# Patient Record
Sex: FEMALE | Race: OTHER | Employment: FULL TIME | URBAN - METROPOLITAN AREA
[De-identification: names, ages, dates, MRNs, and addresses within clinical notes are randomized per-mention and may not be internally consistent; named-entity substitution may affect disease eponyms.]

---

## 2017-03-09 ENCOUNTER — OFFICE VISIT (OUTPATIENT)
Dept: INTERNAL MEDICINE | Age: 62
End: 2017-03-09

## 2017-03-09 VITALS
OXYGEN SATURATION: 98 % | DIASTOLIC BLOOD PRESSURE: 80 MMHG | BODY MASS INDEX: 37.55 KG/M2 | HEART RATE: 82 BPM | SYSTOLIC BLOOD PRESSURE: 130 MMHG | WEIGHT: 212 LBS

## 2017-03-09 DIAGNOSIS — Z12.39 BREAST CANCER SCREENING: ICD-10-CM

## 2017-03-09 DIAGNOSIS — E78.5 HYPERLIPIDEMIA, UNSPECIFIED HYPERLIPIDEMIA TYPE: Primary | Chronic | ICD-10-CM

## 2017-03-09 DIAGNOSIS — J45.20 MILD INTERMITTENT ASTHMA WITHOUT COMPLICATION: Chronic | ICD-10-CM

## 2017-03-09 DIAGNOSIS — E66.09 NON MORBID OBESITY DUE TO EXCESS CALORIES: Chronic | ICD-10-CM

## 2017-03-09 DIAGNOSIS — I10 ESSENTIAL HYPERTENSION: Chronic | ICD-10-CM

## 2017-03-09 DIAGNOSIS — R79.89 ELEVATED LIVER FUNCTION TESTS: Chronic | ICD-10-CM

## 2017-03-09 DIAGNOSIS — R73.9 HYPERGLYCEMIA: Chronic | ICD-10-CM

## 2017-03-09 DIAGNOSIS — E03.9 HYPOTHYROIDISM, UNSPECIFIED TYPE: Chronic | ICD-10-CM

## 2017-03-09 DIAGNOSIS — R73.03 PREDIABETES: Chronic | ICD-10-CM

## 2017-03-09 PROCEDURE — 99214 OFFICE O/P EST MOD 30 MIN: CPT | Performed by: INTERNAL MEDICINE

## 2017-03-09 RX ORDER — ALBUTEROL SULFATE 90 UG/1
2 AEROSOL, METERED RESPIRATORY (INHALATION) EVERY 6 HOURS PRN
COMMUNITY
Start: 2017-03-09 | End: 2020-03-19 | Stop reason: SDUPTHER

## 2017-03-09 ASSESSMENT — ENCOUNTER SYMPTOMS
EYES NEGATIVE: 1
WHEEZING: 0
GASTROINTESTINAL NEGATIVE: 1
STRIDOR: 0
COUGH: 0
SHORTNESS OF BREATH: 1
APNEA: 0
CHEST TIGHTNESS: 0
CHOKING: 0

## 2017-03-13 LAB
ALBUMIN SERPL-MCNC: 3.8 G/DL (ref 3.4–5)
ALP BLD-CCNC: 120 U/L (ref 40–129)
ALT SERPL-CCNC: 24 U/L (ref 10–40)
ANION GAP SERPL CALCULATED.3IONS-SCNC: 13 MMOL/L (ref 3–16)
AST SERPL-CCNC: 17 U/L (ref 15–37)
BASOPHILS ABSOLUTE: 0 K/UL (ref 0–0.2)
BASOPHILS RELATIVE PERCENT: 0.6 %
BILIRUB SERPL-MCNC: <0.2 MG/DL (ref 0–1)
BILIRUBIN DIRECT: <0.2 MG/DL (ref 0–0.3)
BILIRUBIN, INDIRECT: NORMAL MG/DL (ref 0–1)
BUN BLDV-MCNC: 15 MG/DL (ref 7–20)
CALCIUM SERPL-MCNC: 9.3 MG/DL (ref 8.3–10.6)
CHLORIDE BLD-SCNC: 102 MMOL/L (ref 99–110)
CHOLESTEROL, TOTAL: 236 MG/DL (ref 0–199)
CO2: 24 MMOL/L (ref 21–32)
CREAT SERPL-MCNC: <0.5 MG/DL (ref 0.6–1.1)
EOSINOPHILS ABSOLUTE: 0.4 K/UL (ref 0–0.6)
EOSINOPHILS RELATIVE PERCENT: 5.1 %
ESTIMATED AVERAGE GLUCOSE: 119.8 MG/DL
GFR AFRICAN AMERICAN: >60
GFR NON-AFRICAN AMERICAN: >60
GLUCOSE BLD-MCNC: 108 MG/DL (ref 70–99)
HBA1C MFR BLD: 5.8 %
HCT VFR BLD CALC: 38.3 % (ref 36–48)
HDLC SERPL-MCNC: 51 MG/DL (ref 40–60)
HEMOGLOBIN: 12.2 G/DL (ref 12–16)
LDL CHOLESTEROL CALCULATED: 163 MG/DL
LYMPHOCYTES ABSOLUTE: 2.9 K/UL (ref 1–5.1)
LYMPHOCYTES RELATIVE PERCENT: 40 %
MCH RBC QN AUTO: 28.9 PG (ref 26–34)
MCHC RBC AUTO-ENTMCNC: 31.8 G/DL (ref 31–36)
MCV RBC AUTO: 90.7 FL (ref 80–100)
MONOCYTES ABSOLUTE: 0.6 K/UL (ref 0–1.3)
MONOCYTES RELATIVE PERCENT: 9.1 %
NEUTROPHILS ABSOLUTE: 3.2 K/UL (ref 1.7–7.7)
NEUTROPHILS RELATIVE PERCENT: 45.2 %
PDW BLD-RTO: 14.5 % (ref 12.4–15.4)
PHOSPHORUS: 3.9 MG/DL (ref 2.5–4.9)
PLATELET # BLD: 201 K/UL (ref 135–450)
PMV BLD AUTO: 10.7 FL (ref 5–10.5)
POTASSIUM SERPL-SCNC: 4 MMOL/L (ref 3.5–5.1)
RBC # BLD: 4.22 M/UL (ref 4–5.2)
SODIUM BLD-SCNC: 139 MMOL/L (ref 136–145)
TOTAL PROTEIN: 7.4 G/DL (ref 6.4–8.2)
TRIGL SERPL-MCNC: 111 MG/DL (ref 0–150)
TSH SERPL DL<=0.05 MIU/L-ACNC: 0.19 UIU/ML (ref 0.27–4.2)
VLDLC SERPL CALC-MCNC: 22 MG/DL
WBC # BLD: 7.1 K/UL (ref 4–11)

## 2017-04-03 DIAGNOSIS — E03.9 HYPOTHYROIDISM, UNSPECIFIED TYPE: Chronic | ICD-10-CM

## 2017-04-03 RX ORDER — ATORVASTATIN CALCIUM 10 MG/1
10 TABLET, FILM COATED ORAL DAILY
Qty: 30 TABLET | Refills: 3 | Status: SHIPPED | OUTPATIENT
Start: 2017-04-03 | End: 2017-07-22 | Stop reason: SDUPTHER

## 2017-04-03 RX ORDER — LEVOTHYROXINE SODIUM 0.1 MG/1
100 TABLET ORAL DAILY
Qty: 30 TABLET | Refills: 3 | Status: SHIPPED | OUTPATIENT
Start: 2017-04-03 | End: 2017-07-24 | Stop reason: SDUPTHER

## 2017-05-24 ENCOUNTER — HOSPITAL ENCOUNTER (OUTPATIENT)
Dept: MAMMOGRAPHY | Age: 62
Discharge: HOME | End: 2017-05-24
Attending: INTERNAL MEDICINE | Admitting: INTERNAL MEDICINE

## 2017-05-24 DIAGNOSIS — Z12.31 VISIT FOR SCREENING MAMMOGRAM: ICD-10-CM

## 2017-05-24 DIAGNOSIS — R92.8 ABNORMAL MAMMOGRAM: ICD-10-CM

## 2017-06-12 ENCOUNTER — OFFICE VISIT (OUTPATIENT)
Dept: INTERNAL MEDICINE | Age: 62
End: 2017-06-12

## 2017-06-12 ENCOUNTER — HOSPITAL ENCOUNTER (OUTPATIENT)
Dept: OTHER | Age: 62
Discharge: HOME | End: 2017-06-12
Attending: INTERNAL MEDICINE | Admitting: INTERNAL MEDICINE

## 2017-06-12 VITALS
HEART RATE: 72 BPM | WEIGHT: 229 LBS | SYSTOLIC BLOOD PRESSURE: 148 MMHG | RESPIRATION RATE: 12 BRPM | HEIGHT: 63 IN | DIASTOLIC BLOOD PRESSURE: 86 MMHG | BODY MASS INDEX: 40.57 KG/M2 | OXYGEN SATURATION: 96 %

## 2017-06-12 DIAGNOSIS — M79.621 PAIN IN RIGHT UPPER ARM: ICD-10-CM

## 2017-06-12 DIAGNOSIS — Z11.59 NEED FOR HEPATITIS C SCREENING TEST: ICD-10-CM

## 2017-06-12 DIAGNOSIS — R63.5 ABNORMAL WEIGHT GAIN: ICD-10-CM

## 2017-06-12 DIAGNOSIS — R06.09 DYSPNEA ON EXERTION: ICD-10-CM

## 2017-06-12 DIAGNOSIS — I10 ESSENTIAL HYPERTENSION: Chronic | ICD-10-CM

## 2017-06-12 DIAGNOSIS — R06.09 DYSPNEA ON EXERTION: Primary | ICD-10-CM

## 2017-06-12 DIAGNOSIS — R79.89 ELEVATED LIVER FUNCTION TESTS: Chronic | ICD-10-CM

## 2017-06-12 DIAGNOSIS — E78.5 HYPERLIPIDEMIA, UNSPECIFIED HYPERLIPIDEMIA TYPE: Chronic | ICD-10-CM

## 2017-06-12 DIAGNOSIS — R73.03 PREDIABETES: Chronic | ICD-10-CM

## 2017-06-12 DIAGNOSIS — E66.01 MORBID OBESITY DUE TO EXCESS CALORIES (HCC): Chronic | ICD-10-CM

## 2017-06-12 DIAGNOSIS — R53.83 FATIGUE, UNSPECIFIED TYPE: ICD-10-CM

## 2017-06-12 DIAGNOSIS — E03.9 HYPOTHYROIDISM, UNSPECIFIED TYPE: Chronic | ICD-10-CM

## 2017-06-12 PROCEDURE — 99214 OFFICE O/P EST MOD 30 MIN: CPT | Performed by: INTERNAL MEDICINE

## 2017-06-12 ASSESSMENT — ENCOUNTER SYMPTOMS
BLURRED VISION: 0
SHORTNESS OF BREATH: 1

## 2017-06-12 ASSESSMENT — PATIENT HEALTH QUESTIONNAIRE - PHQ9
SUM OF ALL RESPONSES TO PHQ9 QUESTIONS 1 & 2: 0
2. FEELING DOWN, DEPRESSED OR HOPELESS: 0
SUM OF ALL RESPONSES TO PHQ QUESTIONS 1-9: 0
1. LITTLE INTEREST OR PLEASURE IN DOING THINGS: 0

## 2017-06-15 LAB
ALBUMIN SERPL-MCNC: 4.1 G/DL (ref 3.4–5)
ALP BLD-CCNC: 118 U/L (ref 40–129)
ALT SERPL-CCNC: 29 U/L (ref 10–40)
ANION GAP SERPL CALCULATED.3IONS-SCNC: 12 MMOL/L (ref 3–16)
AST SERPL-CCNC: 22 U/L (ref 15–37)
BASOPHILS ABSOLUTE: 0 K/UL (ref 0–0.2)
BASOPHILS RELATIVE PERCENT: 0.7 %
BILIRUB SERPL-MCNC: 0.8 MG/DL (ref 0–1)
BILIRUBIN DIRECT: <0.2 MG/DL (ref 0–0.3)
BILIRUBIN, INDIRECT: NORMAL MG/DL (ref 0–1)
BUN BLDV-MCNC: 16 MG/DL (ref 7–20)
CALCIUM SERPL-MCNC: 9.6 MG/DL (ref 8.3–10.6)
CHLORIDE BLD-SCNC: 99 MMOL/L (ref 99–110)
CHOLESTEROL, TOTAL: 162 MG/DL (ref 0–199)
CO2: 25 MMOL/L (ref 21–32)
CREAT SERPL-MCNC: <0.5 MG/DL (ref 0.6–1.1)
EOSINOPHILS ABSOLUTE: 0.4 K/UL (ref 0–0.6)
EOSINOPHILS RELATIVE PERCENT: 6.2 %
GFR AFRICAN AMERICAN: >60
GFR NON-AFRICAN AMERICAN: >60
GLUCOSE BLD-MCNC: 116 MG/DL (ref 70–99)
HCT VFR BLD CALC: 38.4 % (ref 36–48)
HDLC SERPL-MCNC: 52 MG/DL (ref 40–60)
HEMOGLOBIN: 12.4 G/DL (ref 12–16)
HEPATITIS C ANTIBODY INTERPRETATION: NORMAL
LDL CHOLESTEROL CALCULATED: 95 MG/DL
LYMPHOCYTES ABSOLUTE: 2.4 K/UL (ref 1–5.1)
LYMPHOCYTES RELATIVE PERCENT: 38.1 %
MCH RBC QN AUTO: 29.6 PG (ref 26–34)
MCHC RBC AUTO-ENTMCNC: 32.3 G/DL (ref 31–36)
MCV RBC AUTO: 91.7 FL (ref 80–100)
MONOCYTES ABSOLUTE: 0.5 K/UL (ref 0–1.3)
MONOCYTES RELATIVE PERCENT: 8 %
NEUTROPHILS ABSOLUTE: 3 K/UL (ref 1.7–7.7)
NEUTROPHILS RELATIVE PERCENT: 47 %
PDW BLD-RTO: 14.6 % (ref 12.4–15.4)
PHOSPHORUS: 4 MG/DL (ref 2.5–4.9)
PLATELET # BLD: 220 K/UL (ref 135–450)
PMV BLD AUTO: 10.7 FL (ref 5–10.5)
POTASSIUM SERPL-SCNC: 4.6 MMOL/L (ref 3.5–5.1)
RBC # BLD: 4.18 M/UL (ref 4–5.2)
SODIUM BLD-SCNC: 136 MMOL/L (ref 136–145)
TOTAL PROTEIN: 7.5 G/DL (ref 6.4–8.2)
TRIGL SERPL-MCNC: 75 MG/DL (ref 0–150)
TSH SERPL DL<=0.05 MIU/L-ACNC: 1.74 UIU/ML (ref 0.27–4.2)
VLDLC SERPL CALC-MCNC: 15 MG/DL
WBC # BLD: 6.4 K/UL (ref 4–11)

## 2017-06-16 ENCOUNTER — HOSPITAL ENCOUNTER (OUTPATIENT)
Dept: GENERAL RADIOLOGY | Facility: MEDICAL CENTER | Age: 62
Discharge: HOME | End: 2017-06-16
Attending: ORTHOPAEDIC SURGERY | Admitting: ORTHOPAEDIC SURGERY

## 2017-06-16 ENCOUNTER — OFFICE VISIT (OUTPATIENT)
Dept: ORTHOPEDIC SURGERY | Age: 62
End: 2017-06-16

## 2017-06-16 VITALS
HEIGHT: 63 IN | WEIGHT: 229 LBS | BODY MASS INDEX: 40.57 KG/M2 | SYSTOLIC BLOOD PRESSURE: 139 MMHG | DIASTOLIC BLOOD PRESSURE: 81 MMHG | HEART RATE: 77 BPM

## 2017-06-16 DIAGNOSIS — M54.2 PAIN RADIATING TO NECK: ICD-10-CM

## 2017-06-16 DIAGNOSIS — G89.29 CHRONIC RIGHT SHOULDER PAIN: Primary | ICD-10-CM

## 2017-06-16 DIAGNOSIS — M25.511 CHRONIC RIGHT SHOULDER PAIN: ICD-10-CM

## 2017-06-16 DIAGNOSIS — R20.2 NUMBNESS AND TINGLING OF BOTH UPPER EXTREMITIES WHILE SLEEPING: ICD-10-CM

## 2017-06-16 DIAGNOSIS — M19.011 ARTHRITIS OF RIGHT ACROMIOCLAVICULAR JOINT: ICD-10-CM

## 2017-06-16 DIAGNOSIS — M75.51 BURSITIS OF RIGHT SHOULDER: ICD-10-CM

## 2017-06-16 DIAGNOSIS — M25.511 CHRONIC RIGHT SHOULDER PAIN: Primary | ICD-10-CM

## 2017-06-16 DIAGNOSIS — M75.21 BICEPS TENDINITIS ON RIGHT: ICD-10-CM

## 2017-06-16 DIAGNOSIS — G89.29 CHRONIC RIGHT SHOULDER PAIN: ICD-10-CM

## 2017-06-16 DIAGNOSIS — R20.0 NUMBNESS AND TINGLING OF BOTH UPPER EXTREMITIES WHILE SLEEPING: ICD-10-CM

## 2017-06-16 DIAGNOSIS — M75.81 TENDINITIS OF RIGHT ROTATOR CUFF: ICD-10-CM

## 2017-06-16 PROBLEM — M75.50 BURSITIS OF SHOULDER: Status: ACTIVE | Noted: 2017-06-16

## 2017-06-16 PROBLEM — M75.80 ROTATOR CUFF TENDINITIS: Status: ACTIVE | Noted: 2017-06-16

## 2017-06-16 LAB
ESTIMATED AVERAGE GLUCOSE: 134.1 MG/DL
HBA1C MFR BLD: 6.3 %

## 2017-06-16 PROCEDURE — 99243 OFF/OP CNSLTJ NEW/EST LOW 30: CPT | Performed by: ORTHOPAEDIC SURGERY

## 2017-06-16 PROCEDURE — 73030 X-RAY EXAM OF SHOULDER: CPT | Performed by: ORTHOPAEDIC SURGERY

## 2017-06-16 PROCEDURE — 72020 X-RAY EXAM OF SPINE 1 VIEW: CPT | Performed by: ORTHOPAEDIC SURGERY

## 2017-06-16 RX ORDER — METHYLPREDNISOLONE 4 MG/1
TABLET ORAL
Qty: 1 KIT | Refills: 0 | Status: SHIPPED | OUTPATIENT
Start: 2017-06-16 | End: 2018-01-18

## 2017-06-16 RX ORDER — MELOXICAM 15 MG/1
15 TABLET ORAL DAILY
Qty: 30 TABLET | Refills: 2 | Status: SHIPPED | OUTPATIENT
Start: 2017-06-16 | End: 2018-06-19 | Stop reason: SDUPTHER

## 2017-06-17 ASSESSMENT — ENCOUNTER SYMPTOMS
GASTROINTESTINAL NEGATIVE: 1
STRIDOR: 0
WHEEZING: 0
APNEA: 0
CHEST TIGHTNESS: 0
CHOKING: 0
EYES NEGATIVE: 1
COUGH: 0

## 2017-06-27 ENCOUNTER — HOSPITAL ENCOUNTER (OUTPATIENT)
Dept: MRI IMAGING | Age: 62
Discharge: HOME | End: 2017-06-20
Attending: ORTHOPAEDIC SURGERY | Admitting: ORTHOPAEDIC SURGERY

## 2017-06-27 DIAGNOSIS — M54.2 CERVICALGIA: ICD-10-CM

## 2017-12-02 LAB
CHOLESTEROL, TOTAL: 156 MG/DL
CHOLESTEROL/HDL RATIO: NORMAL
HDLC SERPL-MCNC: 47 MG/DL (ref 35–70)
LDL CHOLESTEROL CALCULATED: 87 MG/DL (ref 0–160)
TRIGL SERPL-MCNC: 110 MG/DL
VLDLC SERPL CALC-MCNC: 22 MG/DL

## 2017-12-28 ENCOUNTER — OFFICE VISIT (OUTPATIENT)
Dept: FAMILY MEDICINE CLINIC | Age: 62
End: 2017-12-28

## 2017-12-28 VITALS
BODY MASS INDEX: 41.27 KG/M2 | DIASTOLIC BLOOD PRESSURE: 78 MMHG | WEIGHT: 233 LBS | OXYGEN SATURATION: 91 % | TEMPERATURE: 98.2 F | HEART RATE: 66 BPM | SYSTOLIC BLOOD PRESSURE: 128 MMHG

## 2017-12-28 DIAGNOSIS — J45.909 MILD ASTHMA WITHOUT COMPLICATION, UNSPECIFIED WHETHER PERSISTENT: Chronic | ICD-10-CM

## 2017-12-28 DIAGNOSIS — R73.03 PREDIABETES: Chronic | ICD-10-CM

## 2017-12-28 DIAGNOSIS — J30.2 SEASONAL ALLERGIC RHINITIS, UNSPECIFIED CHRONICITY, UNSPECIFIED TRIGGER: Chronic | ICD-10-CM

## 2017-12-28 DIAGNOSIS — E03.9 HYPOTHYROIDISM, UNSPECIFIED TYPE: Chronic | ICD-10-CM

## 2017-12-28 DIAGNOSIS — E78.5 HYPERLIPIDEMIA, UNSPECIFIED HYPERLIPIDEMIA TYPE: Chronic | ICD-10-CM

## 2017-12-28 DIAGNOSIS — G47.33 OBSTRUCTIVE SLEEP APNEA: Chronic | ICD-10-CM

## 2017-12-28 DIAGNOSIS — I10 ESSENTIAL HYPERTENSION: Chronic | ICD-10-CM

## 2017-12-28 DIAGNOSIS — Z00.00 ROUTINE GENERAL MEDICAL EXAMINATION AT A HEALTH CARE FACILITY: Primary | ICD-10-CM

## 2017-12-28 PROCEDURE — 99396 PREV VISIT EST AGE 40-64: CPT | Performed by: FAMILY MEDICINE

## 2017-12-28 RX ORDER — FLUTICASONE PROPIONATE 220 UG/1
2 AEROSOL, METERED RESPIRATORY (INHALATION) 2 TIMES DAILY
Qty: 1 INHALER | Refills: 1 | Status: SHIPPED | OUTPATIENT
Start: 2017-12-28 | End: 2018-01-18 | Stop reason: ALTCHOICE

## 2017-12-28 NOTE — PROGRESS NOTES
Patient is a 63y.o. year old female presenting for a complete physical today. She is a new patient. She works and is friends with Neelam Jenkins. She was last seen by Dr. Dottie Guzman in . She was a new patient to him in . Her sister  last week. She went to [facility] and she went there and saw a doctor then . She had been visiting and had labs at end of . She brought labs from [facility] with her from end of . She started Premarin 1 week ago. Denies hot flashes . No vaginal dryness. She started with cough 3 days ago . No fever. Clear sputum. Some nasal congestion . Runny nose. No sore throat . She got back from [facility] on 17. She has been using ProAir but not helping. Does not recall ever using steroid inhaler. Sleeping okay. Last colonoscopy: 3/9/15 - recheck in 3/20  Last DEXA: none  Last mammogram:   Last PAP: hysterectomy in - prolapse. History of abnormal PAP: never  Last eye exam: years ago. Current smoker: no  Self breast exam: yes  Exercise: not regular. Caffeine: 0-1/ day - tea, coffee/ pop  Alcohol: no    Patient's allergies and medications were reviewed. Patient's past medical, surgical, social , and family history were reviewed.      Past Medical History:   Diagnosis Date    Asthma     Elevated liver function tests 10/21/2016    Essential hypertension 2016    GERD (gastroesophageal reflux disease)     History of cystocele 2016    Hyperglycemia 10/21/2016    Hyperlipidemia     Hypertension     Hypothyroidism 10/21/2016    Migraine     Morbid obesity due to excess calories ([address] 10/21/2016    Reina's neuroma of left foot 10/21/2016    Non morbid obesity due to excess calories 2016    Obesity     Obstructive sleep apnea 10/21/2016    Osteoarthritis     Prediabetes 10/21/2016    Rectocele 2016    Seasonal allergic rhinitis 2016    Snoring 10/21/2016    Status post gastric banding 2010    Uterovaginal Kiwi Extract      LIPS ITCH    Other Itching     Itchy lips and mouth with fruits-watermelon, peaches,meloln    Penicillins      Other reaction(s): Other (See Comments)  unsure       Social History   Substance Use Topics    Smoking status: Never Smoker    Smokeless tobacco: Never Used    Alcohol use No        Family History   Problem Relation Age of Onset    Cancer Mother     Diabetes Father     High Blood Pressure Father     High Cholesterol Father     Heart Disease Father     Asthma Other     Uterine Cancer Other     Heart Disease Other     Depression Other     Diabetes Other     High Cholesterol Other     High Blood Pressure Other     Kidney Disease Other     Migraines Other     Stroke Other     Arthritis Other         ROS:  Feeling well. No dyspnea or chest pain on exertion. No abdominal pain, change in bowel habits, black or bloody stools. No urinary tract symptoms. No neurological complaints. See patient physical/  ROS questionnaire. OBJECTIVE:   /78   Pulse 66   Temp 98.2 °F (36.8 °C) (Oral)   Wt 233 lb (105.7 kg)   SpO2 91%   BMI 41.27 kg/m²   The patient appears well, alert, oriented x 3, in no distress. HEENT : normocephalic, atraumatic, PERRLA, EOMI, tympanic membranes and nasopharynx are normal.  Neck supple. No adenopathy or thyromegaly. Upper extremities : DTRs 2+ biceps/ triceps/ brachioradialis bilateral.  FROM. Strength 5/5. Lungs are clear, good air entry, no wheezes, rhonchi or rales. Breathing comfortably. Cardiovascular: regular rate and rhythm. S1 and S2 are normal, no murmurs,rubs, and gallops. No edema. Abdomen is soft without tenderness, guarding, mass or organomegaly. Normal bowel sounds. Back: non tender. FROM. negative straight leg-raise. Lower extremities : DTRs 2+ knees and ankles bilateral.  FROM. Strength 5/5. Negative straight leg-raise. No edema or erythema bilateral.  normal peripheral pulses.    Neuro: Cranial nerves 2-12 are normal. Deep tendon reflexes are 2+ and equal to all extremities. No focal sensory, or motor deficit noted. Skin: no rashes or suspicious lesions. ASSESSMENT:   Altha Gowers was seen today for new patient, cough and congestion. Diagnoses and all orders for this visit:    Routine general medical examination at a health care facility    Prediabetes  -     Continue MetFORMIN (GLUCOPHAGE) 500 MG tablet; Take 1 tablet by mouth Daily with supper and dietary/ lifestyle modifications. -     Reviewed labs provided from end of 11/17 and 12/17. HgbA1c= 6.0 . Hypothyroidism, unspecified type        -     Stable and continue Synthroid 0.100 mg qd. Mild asthma without complication, unspecified whether persistent  -     Start Fluticasone (FLOVENT HFA) 220 MCG/ACT inhaler; Inhale 2 puffs into the lungs 2 times daily  Seasonal allergic rhinitis, unspecified chronicity, unspecified trigger        -     Stable and continue Allegra qd prn. Essential hypertension        -     Controlled. Hyperlipidemia, unspecified hyperlipidemia type        -     Continue Lipitor 10 mg qd and low cholesterol diet. Obstructive sleep apnea        -     May benefit from Sleep Study. PLAN:   Follow a low fat, low cholesterol diet,  continue current healthy lifestyle patterns, including regular cardiovascular exercise >150 minutes per week,  and return for routine annual checkups  Colonoscopy screening recommended in 2020 . Yearly mammogram recommended , as well as monthly self breast exam.   Calcium 1500 mg/ day , Vitamin D 800 IU/ day, and weight bearing exercise. Follow up 3 months/ prn.

## 2018-01-18 ENCOUNTER — OFFICE VISIT (OUTPATIENT)
Dept: ENDOCRINOLOGY | Age: 63
End: 2018-01-18

## 2018-01-18 VITALS
DIASTOLIC BLOOD PRESSURE: 80 MMHG | OXYGEN SATURATION: 97 % | WEIGHT: 237.4 LBS | SYSTOLIC BLOOD PRESSURE: 118 MMHG | HEIGHT: 63 IN | BODY MASS INDEX: 42.06 KG/M2 | HEART RATE: 96 BPM

## 2018-01-18 DIAGNOSIS — Z98.84 STATUS POST GASTRIC BANDING: Chronic | ICD-10-CM

## 2018-01-18 DIAGNOSIS — R63.5 ABNORMAL WEIGHT GAIN: ICD-10-CM

## 2018-01-18 DIAGNOSIS — E04.9 GOITER: ICD-10-CM

## 2018-01-18 DIAGNOSIS — R73.03 PREDIABETES: Chronic | ICD-10-CM

## 2018-01-18 DIAGNOSIS — E03.9 HYPOTHYROIDISM, UNSPECIFIED TYPE: Chronic | ICD-10-CM

## 2018-01-18 DIAGNOSIS — E03.9 ACQUIRED HYPOTHYROIDISM: Primary | Chronic | ICD-10-CM

## 2018-01-18 PROCEDURE — 99204 OFFICE O/P NEW MOD 45 MIN: CPT | Performed by: INTERNAL MEDICINE

## 2018-01-18 RX ORDER — LEVOTHYROXINE SODIUM 0.1 MG/1
100 TABLET ORAL DAILY
Qty: 90 TABLET | Refills: 0 | Status: SHIPPED | OUTPATIENT
Start: 2018-01-18 | End: 2018-08-15 | Stop reason: SDUPTHER

## 2018-01-18 ASSESSMENT — PATIENT HEALTH QUESTIONNAIRE - PHQ9
SUM OF ALL RESPONSES TO PHQ QUESTIONS 1-9: 0
1. LITTLE INTEREST OR PLEASURE IN DOING THINGS: 0
2. FEELING DOWN, DEPRESSED OR HOPELESS: 0
SUM OF ALL RESPONSES TO PHQ9 QUESTIONS 1 & 2: 0

## 2018-01-18 NOTE — PROGRESS NOTES
No  Made a decision to obtain old records No  Reviewed and summarized old records Yes  Thyroid disease, on levothyroxine. Obtained history from other than patient No    Gillermo Aase was counseled regarding symptoms of current diagnosis, course and complications of disease if inadequately treated, side effects of medications, diagnosis, treatment options, and prognosis, risks, benefits, complications, and alternatives of treatment, labs, imaging and other studies and treatment targets and goals. She understands instructions and counseling. Return in about 1 month (around 2/18/2018) for thyroid problems.

## 2018-01-22 DIAGNOSIS — R73.03 PREDIABETES: Chronic | ICD-10-CM

## 2018-01-22 DIAGNOSIS — E03.9 ACQUIRED HYPOTHYROIDISM: Chronic | ICD-10-CM

## 2018-01-22 DIAGNOSIS — R63.5 ABNORMAL WEIGHT GAIN: ICD-10-CM

## 2018-01-22 LAB
ANTI-THYROGLOB ABS: <10 IU/ML
CORTISOL - AM: 6.2 UG/DL (ref 4.3–22.4)
ESTIMATED AVERAGE GLUCOSE: 131.2 MG/DL
HBA1C MFR BLD: 6.2 %
PROLACTIN: 10.8 NG/ML
T3 FREE: 3.3 PG/ML (ref 2.3–4.2)
T4 FREE: 1.2 NG/DL (ref 0.9–1.8)
THYROID PEROXIDASE (TPO) ABS: 14 IU/ML
TSH SERPL DL<=0.05 MIU/L-ACNC: 0.58 UIU/ML (ref 0.27–4.2)

## 2018-01-24 LAB — ADRENOCORTICOTROPIC HORMONE: 16 PG/ML (ref 6–58)

## 2018-01-29 DIAGNOSIS — R63.5 ABNORMAL WEIGHT GAIN: ICD-10-CM

## 2018-02-01 LAB
CORTISOL SALIVARY: 0.04 UG/DL
CORTISOL SALIVARY: 0.04 UG/DL
CORTISOL SALIVARY: 0.06 UG/DL

## 2018-03-12 ENCOUNTER — OFFICE VISIT (OUTPATIENT)
Dept: ENDOCRINOLOGY | Age: 63
End: 2018-03-12

## 2018-03-12 VITALS
DIASTOLIC BLOOD PRESSURE: 56 MMHG | HEART RATE: 77 BPM | SYSTOLIC BLOOD PRESSURE: 100 MMHG | HEIGHT: 63 IN | OXYGEN SATURATION: 96 % | WEIGHT: 224.4 LBS | BODY MASS INDEX: 39.76 KG/M2

## 2018-03-12 DIAGNOSIS — R73.03 PREDIABETES: Chronic | ICD-10-CM

## 2018-03-12 DIAGNOSIS — E04.9 GOITER: ICD-10-CM

## 2018-03-12 DIAGNOSIS — R63.5 ABNORMAL WEIGHT GAIN: ICD-10-CM

## 2018-03-12 DIAGNOSIS — E03.9 ACQUIRED HYPOTHYROIDISM: Primary | Chronic | ICD-10-CM

## 2018-03-12 DIAGNOSIS — E03.9 HYPOTHYROIDISM, UNSPECIFIED TYPE: Chronic | ICD-10-CM

## 2018-03-12 PROCEDURE — 99214 OFFICE O/P EST MOD 30 MIN: CPT | Performed by: INTERNAL MEDICINE

## 2018-03-12 ASSESSMENT — PATIENT HEALTH QUESTIONNAIRE - PHQ9
SUM OF ALL RESPONSES TO PHQ QUESTIONS 1-9: 0
2. FEELING DOWN, DEPRESSED OR HOPELESS: 0
1. LITTLE INTEREST OR PLEASURE IN DOING THINGS: 0
SUM OF ALL RESPONSES TO PHQ9 QUESTIONS 1 & 2: 0

## 2018-03-12 NOTE — PROGRESS NOTES
Laterality Date    ABDOMINOPLASTY  1996    Helen Sandra SUSPENSION  09/21/2012    Dr. Rome Jackson, [facility]      CHOLECYSTECTOMY      COLONOSCOPY  12/04/2013    Dr. Rell Montes.  [place]    CYSTOSCOPY  09/21/2012    Dr. Jonathan Gergory Left 10/25/2016    Dr. Husain Million, VAGINAL  09/21/2012    Dr. Beverly Nassar    LAP BAND  01/15/2010    Dr. Chow Face Left 07/26/2010    Dr. Oliver Gum - incisional biopsy of left tonsillar mass    TONSILLECTOMY  [address] [place] [address]    UPPER GASTROINTESTINAL ENDOSCOPY  01/23/2015    Dr. Santos Keita  10/16/2010    Dr. Gopal Zapata    work related injury, removed a bone     Family History   Problem Relation Age of Onset    Cancer Mother     Diabetes Father     High Blood Pressure Father     High Cholesterol Father     Heart Disease Father     Asthma Other     Uterine Cancer Other     Heart Disease Other     Depression Other     Diabetes Other     High Cholesterol Other     High Blood Pressure Other     Kidney Disease Other     Migraines Other     Stroke Other     Arthritis Other     No Known Problems Daughter     No Known Problems Daughter     No Known Problems Daughter     No Known Problems Daughter     No Known Problems Daughter     Other Sister      Brain tumor Surgery-didn't wake up    Diabetes Brother     No Known Problems Sister     No Known Problems Sister     Cancer Brother      cancer    Cancer Brother      bone    No Known Problems Brother     No Known Problems Brother     No Known Problems Brother     No Known Problems Brother     No Known Problems Brother     No Known Problems Son      Social Exam:  Constitutional: no acute distress, well appearing, well nourished  Psychiatric: oriented to person, place and time, judgement, insight and normal, recent and remote memory and intact and mood, affect are normal  Skin: skin and subcutaneous tissue is normal without mass, normal turgor  Head and Face: examination of head and face revealed no abnormalities  Eyes: no lid or conjunctival swelling, no erythema or discharge, pupils are normal, equal, round, and reactive to light  Ears/Nose: external inspection of ears and nose revealed no abnormalities, hearing is grossly normal  Oropharynx/Mouth/Face: lips, tongue and gums are normal with no lesions, the voice quality was normal  Neck: neck is supple and symmetric, with midline trachea and no masses, thyroid is enlarged  Lymphatics: normal cervical lymph nodes, normal supraclavicular nodes  Pulmonary: no increased work of breathing or signs of respiratory distress, lungs are clear to auscultation  Cardiovascular: normal heart rate and rhythm, normal S1 and S2, no murmurs and pedal pulses and 2+ bilaterally, 1+ edema  Abdomen: abdomen is soft, non-tender with no masses  Musculoskeletal: normal gait and station, exam of the digits and nails are normal  Neurological: normal coordination, normal general cortical function    Lab Review:  Lab Results   Component Value Date    TSH 0.58 01/22/2018     No results found for: FREET4     ASSESSMENT/PLAN:  1. Acquired hypothyroidism    - TSH without Reflex; Future  - T4, Free; Future  - T3, Free; Future  - levothyroxine (SYNTHROID) 100 MCG tablet; Take 1 tablet by mouth daily      2. Class 3 obesity with serious comorbidity and body mass index (BMI) of 40.0 to 44.9 in adult, unspecified obesity type (HCC)  Diet, exercise. 3. Prediabetes  Start Metformin 500 mg bid  - Hemoglobin A1C; Future    4. Status post gastric banding  Diet, exercise. 5. Abnormal weight gain    Follow    6.  Goiter    - US Head Neck Soft Tissue

## 2018-06-19 ENCOUNTER — OFFICE VISIT (OUTPATIENT)
Dept: FAMILY MEDICINE CLINIC | Age: 63
End: 2018-06-19

## 2018-06-19 VITALS
TEMPERATURE: 97 F | RESPIRATION RATE: 16 BRPM | OXYGEN SATURATION: 98 % | HEART RATE: 70 BPM | WEIGHT: 233.6 LBS | BODY MASS INDEX: 41.38 KG/M2 | SYSTOLIC BLOOD PRESSURE: 129 MMHG | DIASTOLIC BLOOD PRESSURE: 69 MMHG

## 2018-06-19 DIAGNOSIS — I83.90 VARICOSE VEIN OF LEG: ICD-10-CM

## 2018-06-19 DIAGNOSIS — M70.62 TROCHANTERIC BURSITIS OF LEFT HIP: Primary | ICD-10-CM

## 2018-06-19 DIAGNOSIS — M76.32 ILIOTIBIAL BAND SYNDROME, LEFT LEG: ICD-10-CM

## 2018-06-19 PROCEDURE — 99214 OFFICE O/P EST MOD 30 MIN: CPT | Performed by: FAMILY MEDICINE

## 2018-06-19 RX ORDER — MELOXICAM 15 MG/1
15 TABLET ORAL DAILY
Qty: 30 TABLET | Refills: 2 | Status: SHIPPED | OUTPATIENT
Start: 2018-06-19 | End: 2020-10-12 | Stop reason: ALTCHOICE

## 2018-07-09 ENCOUNTER — TELEPHONE (OUTPATIENT)
Dept: ENDOCRINOLOGY | Age: 63
End: 2018-07-09

## 2018-07-11 ENCOUNTER — OFFICE VISIT (OUTPATIENT)
Dept: ENDOCRINOLOGY | Age: 63
End: 2018-07-11

## 2018-07-11 VITALS
BODY MASS INDEX: 41.57 KG/M2 | OXYGEN SATURATION: 98 % | DIASTOLIC BLOOD PRESSURE: 72 MMHG | SYSTOLIC BLOOD PRESSURE: 125 MMHG | HEART RATE: 82 BPM | WEIGHT: 234.6 LBS | HEIGHT: 63 IN

## 2018-07-11 DIAGNOSIS — R73.03 PREDIABETES: Chronic | ICD-10-CM

## 2018-07-11 DIAGNOSIS — E04.9 GOITER: ICD-10-CM

## 2018-07-11 DIAGNOSIS — R63.5 ABNORMAL WEIGHT GAIN: ICD-10-CM

## 2018-07-11 DIAGNOSIS — E03.9 ACQUIRED HYPOTHYROIDISM: Primary | Chronic | ICD-10-CM

## 2018-07-11 DIAGNOSIS — Z98.84 STATUS POST GASTRIC BANDING: Chronic | ICD-10-CM

## 2018-07-11 PROCEDURE — 99214 OFFICE O/P EST MOD 30 MIN: CPT | Performed by: INTERNAL MEDICINE

## 2018-07-11 NOTE — PROGRESS NOTES
No Known Problems Brother     No Known Problems Brother     No Known Problems Son      Social History     Social History    Marital status:      Spouse name: Martita Nichols Number of children: 10    Years of education: N/A     Occupational History     and cleans houses      as of 2016     Social History Main Topics    Smoking status: Never Smoker    Smokeless tobacco: Never Used    Alcohol use No    Drug use: No    Sexual activity: Yes     Partners: Male      Comment:  - [facility]     Other Topics Concern    None     Social History Narrative    None     Current Outpatient Prescriptions   Medication Sig Dispense Refill    metFORMIN (GLUCOPHAGE) 500 MG tablet Take 1 tablet by mouth 2 times daily (with meals) 60 tablet 5    levothyroxine (SYNTHROID) 100 MCG tablet Take 1 tablet by mouth daily 90 tablet 0    atorvastatin (LIPITOR) 10 MG tablet Take 1 tablet by mouth daily 30 tablet 12    albuterol sulfate HFA (PROAIR HFA) 108 (90 BASE) MCG/ACT inhaler Inhale 2 puffs into the lungs every 6 hours as needed for Wheezing or Shortness of Breath      fexofenadine (ALLEGRA) 180 MG tablet Take 1 tablet by mouth daily as needed (for allergies)      meloxicam (MOBIC) 15 MG tablet Take 1 tablet by mouth daily Take one tab 15 mg by mouth daily with food for left hip pain. 30 tablet 2    Compression Stockings MISC by Does not apply route Compression 30 mm Hg. 1 each 0     No current facility-administered medications for this visit. Allergies   Allergen Reactions    Latex Rash    Kiwi Extract      LIPS ITCH    Other Itching     Itchy lips and mouth with fruits-watermelon, peaches,meloln, avocado    Penicillins      Other reaction(s):  Other (See Comments)  unsure     Family Status   Relation Status    Mother     Father     Other (Not Specified)    Montse Alive    Montse Alive    Montse Alive    Montse Alive    Montse Alive    Sister     Brother Alive    Left Arm, Position: Sitting, Cuff Size: Large Adult)   Pulse 82   Ht 5' 3\" (1.6 m)   Wt 234 lb 9.6 oz (106.4 kg)   SpO2 98%   BMI 41.56 kg/m²   Wt Readings from Last 3 Encounters:   07/11/18 234 lb 9.6 oz (106.4 kg)   06/19/18 233 lb 9.6 oz (106 kg)   03/12/18 224 lb 6.4 oz (101.8 kg)       Physical Exam:  Constitutional: no acute distress, well appearing, well nourished  Psychiatric: oriented to person, place and time, judgement, insight and normal, recent and remote memory and intact and mood, affect are normal  Skin: skin and subcutaneous tissue is normal without mass, normal turgor  Head and Face: examination of head and face revealed no abnormalities  Eyes: no lid or conjunctival swelling, no erythema or discharge, pupils are normal, equal, round, and reactive to light  Ears/Nose: external inspection of ears and nose revealed no abnormalities, hearing is grossly normal  Oropharynx/Mouth/Face: lips, tongue and gums are normal with no lesions, the voice quality was normal  Neck: neck is supple and symmetric, with midline trachea and no masses, thyroid is enlarged  Lymphatics: normal cervical lymph nodes, normal supraclavicular nodes  Pulmonary: no increased work of breathing or signs of respiratory distress, lungs are clear to auscultation  Cardiovascular: normal heart rate and rhythm, normal S1 and S2, no murmurs and pedal pulses and 2+ bilaterally, 1+ edema  Abdomen: abdomen is soft, non-tender with no masses  Musculoskeletal: normal gait and station, exam of the digits and nails are normal  Neurological: normal coordination, normal general cortical function    Lab Review:  Lab Results   Component Value Date    TSH 0.58 01/22/2018     No results found for: FREET4     ASSESSMENT/PLAN:  1. Acquired hypothyroidism    - TSH without Reflex; Future  - T4, Free; Future  - T3, Free; Future  - levothyroxine (SYNTHROID) 100 MCG tablet;  Take 1 tablet by mouth daily      2. Class 3 obesity with serious comorbidity and body mass index (BMI) of 40.0 to 44.9 in adult, unspecified obesity type (HCC)  Diet, exercise. 3. Prediabetes  Metformin 500 mg tid  - Hemoglobin A1C; Future    4. Status post gastric banding  Diet, exercise. 5. Abnormal weight gain    Follow    6. Goiter    - US Head Neck Soft Tissue Thyroid; Future    7. Hypothyroidism    - levothyroxine (SYNTHROID) 100 MCG tablet; Take 1 tablet by mouth daily  Dispense: 90 tablet; Refill: 0      Reviewed and/or ordered clinical lab results Yes  Reviewed and/or ordered radiology tests Yes   Reviewed and/or ordered other diagnostic tests No  Discussed test results with performing physician No  Independently reviewed image, tracing, or specimen No  Made a decision to obtain old records No  Reviewed and summarized old records Yes  Thyroid disease, on levothyroxine. Obtained history from other than patient No    Diandra Painting was counseled regarding symptoms of thyroid diagnosis, course and complications of disease if inadequately treated, side effects of medications, diagnosis, treatment options, and prognosis, risks, benefits, complications, and alternatives of treatment, labs, imaging and other studies and treatment targets and goals. She understands instructions and counseling. Return in about 1 month (around 8/11/2018) for thyroid problems.

## 2018-08-15 DIAGNOSIS — E03.9 HYPOTHYROIDISM, UNSPECIFIED TYPE: Chronic | ICD-10-CM

## 2018-08-15 DIAGNOSIS — E03.9 ACQUIRED HYPOTHYROIDISM: Chronic | ICD-10-CM

## 2018-08-15 RX ORDER — ATORVASTATIN CALCIUM 10 MG/1
10 TABLET, FILM COATED ORAL DAILY
Qty: 90 TABLET | Refills: 0 | Status: SHIPPED | OUTPATIENT
Start: 2018-08-15 | End: 2018-08-20 | Stop reason: SDUPTHER

## 2018-08-15 RX ORDER — LEVOTHYROXINE SODIUM 0.1 MG/1
100 TABLET ORAL DAILY
Qty: 90 TABLET | Refills: 0 | Status: SHIPPED | OUTPATIENT
Start: 2018-08-15 | End: 2018-08-20 | Stop reason: SDUPTHER

## 2018-08-16 ENCOUNTER — TELEPHONE (OUTPATIENT)
Dept: ENDOCRINOLOGY | Age: 63
End: 2018-08-16

## 2018-08-17 DIAGNOSIS — E03.9 ACQUIRED HYPOTHYROIDISM: Chronic | ICD-10-CM

## 2018-08-17 DIAGNOSIS — R73.03 PREDIABETES: Chronic | ICD-10-CM

## 2018-08-17 LAB
A/G RATIO: 1.1 (ref 1.1–2.2)
ALBUMIN SERPL-MCNC: 3.7 G/DL (ref 3.4–5)
ALP BLD-CCNC: 111 U/L (ref 40–129)
ALT SERPL-CCNC: 46 U/L (ref 10–40)
ANION GAP SERPL CALCULATED.3IONS-SCNC: 11 MMOL/L (ref 3–16)
AST SERPL-CCNC: 37 U/L (ref 15–37)
BILIRUB SERPL-MCNC: 0.3 MG/DL (ref 0–1)
BUN BLDV-MCNC: 10 MG/DL (ref 7–20)
CALCIUM SERPL-MCNC: 9.2 MG/DL (ref 8.3–10.6)
CHLORIDE BLD-SCNC: 100 MMOL/L (ref 99–110)
CO2: 27 MMOL/L (ref 21–32)
CREAT SERPL-MCNC: <0.5 MG/DL (ref 0.6–1.2)
ESTIMATED AVERAGE GLUCOSE: 139.9 MG/DL
GFR AFRICAN AMERICAN: >60
GFR NON-AFRICAN AMERICAN: >60
GLOBULIN: 3.4 G/DL
GLUCOSE BLD-MCNC: 194 MG/DL (ref 70–99)
HBA1C MFR BLD: 6.5 %
POTASSIUM SERPL-SCNC: 4.3 MMOL/L (ref 3.5–5.1)
SODIUM BLD-SCNC: 138 MMOL/L (ref 136–145)
T4 FREE: 1.1 NG/DL (ref 0.9–1.8)
TOTAL PROTEIN: 7.1 G/DL (ref 6.4–8.2)
TSH SERPL DL<=0.05 MIU/L-ACNC: 5.56 UIU/ML (ref 0.27–4.2)

## 2018-08-20 ENCOUNTER — OFFICE VISIT (OUTPATIENT)
Dept: ENDOCRINOLOGY | Age: 63
End: 2018-08-20

## 2018-08-20 VITALS
DIASTOLIC BLOOD PRESSURE: 60 MMHG | BODY MASS INDEX: 41.57 KG/M2 | HEART RATE: 77 BPM | OXYGEN SATURATION: 98 % | SYSTOLIC BLOOD PRESSURE: 122 MMHG | WEIGHT: 234.6 LBS | HEIGHT: 63 IN

## 2018-08-20 DIAGNOSIS — E03.9 HYPOTHYROIDISM, UNSPECIFIED TYPE: Chronic | ICD-10-CM

## 2018-08-20 DIAGNOSIS — Z98.84 STATUS POST GASTRIC BANDING: Chronic | ICD-10-CM

## 2018-08-20 DIAGNOSIS — R73.03 PREDIABETES: Chronic | ICD-10-CM

## 2018-08-20 DIAGNOSIS — E04.9 GOITER: ICD-10-CM

## 2018-08-20 DIAGNOSIS — E78.2 MIXED HYPERLIPIDEMIA: ICD-10-CM

## 2018-08-20 DIAGNOSIS — E03.9 ACQUIRED HYPOTHYROIDISM: Primary | Chronic | ICD-10-CM

## 2018-08-20 DIAGNOSIS — E04.1 THYROID NODULE: ICD-10-CM

## 2018-08-20 PROCEDURE — G0444 DEPRESSION SCREEN ANNUAL: HCPCS | Performed by: INTERNAL MEDICINE

## 2018-08-20 PROCEDURE — 99214 OFFICE O/P EST MOD 30 MIN: CPT | Performed by: INTERNAL MEDICINE

## 2018-08-20 RX ORDER — ATORVASTATIN CALCIUM 10 MG/1
10 TABLET, FILM COATED ORAL DAILY
Qty: 30 TABLET | Refills: 0 | OUTPATIENT
Start: 2018-08-20

## 2018-08-20 RX ORDER — LEVOTHYROXINE SODIUM 0.1 MG/1
100 TABLET ORAL DAILY
Qty: 30 TABLET | Refills: 0 | OUTPATIENT
Start: 2018-08-20

## 2018-08-20 RX ORDER — LEVOTHYROXINE SODIUM 112 UG/1
112 TABLET ORAL DAILY
Qty: 90 TABLET | Refills: 1 | Status: SHIPPED | OUTPATIENT
Start: 2018-08-20 | End: 2018-11-07 | Stop reason: SDUPTHER

## 2018-08-20 RX ORDER — ATORVASTATIN CALCIUM 10 MG/1
10 TABLET, FILM COATED ORAL DAILY
Qty: 90 TABLET | Refills: 1 | Status: SHIPPED | OUTPATIENT
Start: 2018-08-20 | End: 2018-11-07 | Stop reason: SDUPTHER

## 2018-08-20 ASSESSMENT — PATIENT HEALTH QUESTIONNAIRE - PHQ9
1. LITTLE INTEREST OR PLEASURE IN DOING THINGS: 0
SUM OF ALL RESPONSES TO PHQ9 QUESTIONS 1 & 2: 1
SUM OF ALL RESPONSES TO PHQ QUESTIONS 1-9: 0
3. TROUBLE FALLING OR STAYING ASLEEP: 0
SUM OF ALL RESPONSES TO PHQ QUESTIONS 1-9: 0
SUM OF ALL RESPONSES TO PHQ QUESTIONS 1-9: 4
8. MOVING OR SPEAKING SO SLOWLY THAT OTHER PEOPLE COULD HAVE NOTICED. OR THE OPPOSITE, BEING SO FIGETY OR RESTLESS THAT YOU HAVE BEEN MOVING AROUND A LOT MORE THAN USUAL: 0
9. THOUGHTS THAT YOU WOULD BE BETTER OFF DEAD, OR OF HURTING YOURSELF: 0
10. IF YOU CHECKED OFF ANY PROBLEMS, HOW DIFFICULT HAVE THESE PROBLEMS MADE IT FOR YOU TO DO YOUR WORK, TAKE CARE OF THINGS AT HOME, OR GET ALONG WITH OTHER PEOPLE: 0
SUM OF ALL RESPONSES TO PHQ QUESTIONS 1-9: 4
7. TROUBLE CONCENTRATING ON THINGS, SUCH AS READING THE NEWSPAPER OR WATCHING TELEVISION: 0
SUM OF ALL RESPONSES TO PHQ9 QUESTIONS 1 & 2: 0
5. POOR APPETITE OR OVEREATING: 0
6. FEELING BAD ABOUT YOURSELF - OR THAT YOU ARE A FAILURE OR HAVE LET YOURSELF OR YOUR FAMILY DOWN: 0
4. FEELING TIRED OR HAVING LITTLE ENERGY: 3
2. FEELING DOWN, DEPRESSED OR HOPELESS: 1
2. FEELING DOWN, DEPRESSED OR HOPELESS: 0

## 2018-08-20 NOTE — PROGRESS NOTES
10/21/2016    Status post gastric banding 4/26/2010    Uterovaginal prolapse 9/12/2016     Patient Active Problem List    Diagnosis Date Noted    Hypothyroidism 10/21/2016     Priority: High     Class: Chronic    Hyperlipidemia 02/05/2010     Priority: High     Class: Chronic    Hyperglycemia 10/21/2016     Priority: Medium     Class: Chronic    Prediabetes 10/21/2016     Priority: Medium     Class: Chronic    Elevated liver function tests 10/21/2016     Priority: Medium     Class: Chronic    Reina's neuroma of left foot 10/21/2016     Priority: Low     Class: Chronic    Snoring 10/21/2016     Priority: Low     Class: Chronic    Obstructive sleep apnea 10/21/2016     Priority: Low     Class: Chronic    Morbid obesity due to excess calories ([facility] [address] 10/21/2016     Priority: Low     Class: Chronic    Non morbid obesity due to excess calories 09/12/2016     Priority: Low     Class: Chronic    History of cystocele 09/12/2016     Priority: Low     Class: Chronic    Rectocele 09/12/2016     Priority: Low     Class: Chronic    Uterovaginal prolapse 09/12/2016     Priority: Low     Class: Chronic    Status post gastric banding 04/26/2010     Priority: Low     Class: Chronic    Thyroid nodule 08/20/2018    Class 3 obesity with serious comorbidity and body mass index (BMI) of 40.0 to 44.9 in adult ([facility] [address] 01/18/2018    Goiter 01/18/2018    Numbness and tingling of both upper extremities while sleeping 06/16/2017    Arthritis of right acromioclavicular joint 06/16/2017    Pain radiating to neck 06/16/2017    Biceps tendinitis on right 06/16/2017    Bursitis of right shoulder 06/16/2017    Tendinitis of right rotator cuff 06/16/2017    Chronic right shoulder pain 06/16/2017    Pain in right upper arm 06/12/2017    Need for hepatitis C screening test 06/12/2017    Seasonal allergic rhinitis 12/12/2016     Class: Chronic    Essential hypertension 12/12/2016     Class: Chronic    Abnormal weight gain 09/12/2016    Arthritis 02/05/2010    Asthma 02/05/2010     Class: Chronic     Past Surgical History:   Procedure Laterality Date    ABDOMINOPLASTY  1996    Robles Oswaldels SUSPENSION  09/21/2012    Dr. Neena Lopez, [facility]      CHOLECYSTECTOMY      COLONOSCOPY  12/04/2013    Dr. Micky Horn.  [place]    CYSTOSCOPY  09/21/2012    Dr. Aleksandra Dickinson Left 10/25/2016    Dr. Marianne Márquez, VAGINAL  09/21/2012    Dr. Radha Leyva  01/15/2010    Dr. Kevin Horton Left 07/26/2010    Dr. Lasha Santo - incisional biopsy of left tonsillar mass   [address] GASTROINTESTINAL ENDOSCOPY  01/23/2015    Dr. Mccurdy Rom  10/16/2010    Dr. Wilian Herrera    work related injury, removed a bone     Family History   Problem Relation Age of Onset    Cancer Mother     Diabetes Father     High Blood Pressure Father     High Cholesterol Father     Heart Disease Father     Asthma Other     Uterine Cancer Other     Heart Disease Other     Depression Other     Diabetes Other     High Cholesterol Other     High Blood Pressure Other     Kidney Disease Other     Migraines Other     Stroke Other     Arthritis Other     No Known Problems Daughter     No Known Problems Daughter     No Known Problems Daughter     No Known Problems Daughter     No Known Problems Daughter     Other Sister         Brain tumor Surgery-didn't wake up    Diabetes Brother     No Known Problems Sister     No Known Problems Sister     Cancer Brother         cancer    Cancer Brother         bone    No Known Problems Brother     No Known Problems Brother     No Known Problems Brother     No Known Problems Brother     No Known Problems Brother     No Known Problems Son      Social History     Social History    Marital status:      Spouse name: Zuleyka Ordonez Number of children: 10    Years of education: N/A     Occupational History     and cleans houses      as of 2016     Social History Main Topics    Smoking status: Never Smoker    Smokeless tobacco: Never Used    Alcohol use No    Drug use: No    Sexual activity: Yes     Partners: Male      Comment:  - [facility]     Other Topics Concern    None     Social History Narrative    None     Current Outpatient Prescriptions   Medication Sig Dispense Refill    levothyroxine (SYNTHROID) 112 MCG tablet Take 1 tablet by mouth daily 90 tablet 1    atorvastatin (LIPITOR) 10 MG tablet Take 1 tablet by mouth daily 90 tablet 1    metFORMIN (GLUCOPHAGE) 500 MG tablet Take 2 tablets by mouth 2 times daily (with meals) 360 tablet 1    albuterol sulfate HFA (PROAIR HFA) 108 (90 BASE) MCG/ACT inhaler Inhale 2 puffs into the lungs every 6 hours as needed for Wheezing or Shortness of Breath      fexofenadine (ALLEGRA) 180 MG tablet Take 1 tablet by mouth daily as needed (for allergies)      meloxicam (MOBIC) 15 MG tablet Take 1 tablet by mouth daily Take one tab 15 mg by mouth daily with food for left hip pain. 30 tablet 2    Compression Stockings MISC by Does not apply route Compression 30 mm Hg. 1 each 0     No current facility-administered medications for this visit. Allergies   Allergen Reactions    Latex Rash    Kiwi Extract      LIPS ITCH    Other Itching     Itchy lips and mouth with fruits-watermelon, peaches,meloln, avocado    Penicillins      Other reaction(s):  Other (See Comments)  unsure     Family Status   Relation Status    Mother     Father    [facility] Other (Not Specified)    Montse Alive    Montse Alive    Montse Alive    Montse Alive    Montse Alive    Sister tablet; Take 1 tablet by mouth daily      2. Class 3 obesity with serious comorbidity and body mass index (BMI) of 40.0 to 44.9 in adult, unspecified obesity type (HCC)  Diet, exercise. 3. Prediabetes  Metformin 500 mg 2 tabs bid  - Hemoglobin A1C; Future    4. Status post gastric banding  Diet, exercise. 5. Thyroid nodule  Right lobe, follow with US  Right 7 mm  Nodule. 6. Goiter    - US Head Neck Soft Tissue Thyroid; Future      Reviewed and/or ordered clinical lab results Yes  Reviewed and/or ordered radiology tests Yes   Reviewed and/or ordered other diagnostic tests No  Discussed test results with performing physician No  Independently reviewed image, tracing, or specimen No  Made a decision to obtain old records No  Reviewed and summarized old records Yes  Thyroid disease, on levothyroxine. Obtained history from other than patient No    Yfn Mujica was counseled regarding symptoms of thyroid diagnosis, course and complications of disease if inadequately treated, side effects of medications, diagnosis, treatment options, and prognosis, risks, benefits, complications, and alternatives of treatment, labs, imaging and other studies and treatment targets and goals. She understands instructions and counseling. Return in about 3 months (around 11/20/2018) for diabetes.

## 2018-09-24 ENCOUNTER — TELEPHONE (OUTPATIENT)
Dept: FAMILY MEDICINE CLINIC | Age: 63
End: 2018-09-24

## 2018-09-24 DIAGNOSIS — R92.8 ABNORMAL MAMMOGRAM OF LEFT BREAST: Primary | ICD-10-CM

## 2018-09-26 PROBLEM — Z11.59 NEED FOR HEPATITIS C SCREENING TEST: Status: RESOLVED | Noted: 2017-06-12 | Resolved: 2018-09-26

## 2018-10-03 ENCOUNTER — HOSPITAL ENCOUNTER (OUTPATIENT)
Dept: ULTRASOUND IMAGING | Age: 63
Discharge: HOME | End: 2018-10-03
Payer: COMMERCIAL

## 2018-10-03 ENCOUNTER — HOSPITAL ENCOUNTER (OUTPATIENT)
Dept: MAMMOGRAPHY | Age: 63
Discharge: HOME | End: 2018-10-03
Payer: COMMERCIAL

## 2018-10-03 DIAGNOSIS — N63.0 LUMP OR MASS IN BREAST: ICD-10-CM

## 2018-10-03 DIAGNOSIS — R92.8 ABNORMAL MAMMOGRAM: ICD-10-CM

## 2018-10-03 PROCEDURE — 76642 ULTRASOUND BREAST LIMITED: CPT

## 2018-10-03 PROCEDURE — G0279 TOMOSYNTHESIS, MAMMO: HCPCS

## 2018-11-05 DIAGNOSIS — E03.9 ACQUIRED HYPOTHYROIDISM: Chronic | ICD-10-CM

## 2018-11-05 DIAGNOSIS — E78.2 MIXED HYPERLIPIDEMIA: ICD-10-CM

## 2018-11-05 DIAGNOSIS — R73.03 PREDIABETES: Chronic | ICD-10-CM

## 2018-11-05 LAB
A/G RATIO: 1.2 (ref 1.1–2.2)
ALBUMIN SERPL-MCNC: 4.3 G/DL (ref 3.4–5)
ALP BLD-CCNC: 114 U/L (ref 40–129)
ALT SERPL-CCNC: 55 U/L (ref 10–40)
ANION GAP SERPL CALCULATED.3IONS-SCNC: 12 MMOL/L (ref 3–16)
AST SERPL-CCNC: 32 U/L (ref 15–37)
BILIRUB SERPL-MCNC: <0.2 MG/DL (ref 0–1)
BUN BLDV-MCNC: 16 MG/DL (ref 7–20)
CALCIUM SERPL-MCNC: 9.8 MG/DL (ref 8.3–10.6)
CHLORIDE BLD-SCNC: 102 MMOL/L (ref 99–110)
CHOLESTEROL, TOTAL: 151 MG/DL (ref 0–199)
CO2: 26 MMOL/L (ref 21–32)
CREAT SERPL-MCNC: <0.5 MG/DL (ref 0.6–1.2)
GFR AFRICAN AMERICAN: >60
GFR NON-AFRICAN AMERICAN: >60
GLOBULIN: 3.6 G/DL
GLUCOSE BLD-MCNC: 111 MG/DL (ref 70–99)
HDLC SERPL-MCNC: 46 MG/DL (ref 40–60)
LDL CHOLESTEROL CALCULATED: 88 MG/DL
POTASSIUM SERPL-SCNC: 4.5 MMOL/L (ref 3.5–5.1)
SODIUM BLD-SCNC: 140 MMOL/L (ref 136–145)
T4 FREE: 1.4 NG/DL (ref 0.9–1.8)
TOTAL PROTEIN: 7.9 G/DL (ref 6.4–8.2)
TRIGL SERPL-MCNC: 84 MG/DL (ref 0–150)
TSH SERPL DL<=0.05 MIU/L-ACNC: 0.93 UIU/ML (ref 0.27–4.2)
VLDLC SERPL CALC-MCNC: 17 MG/DL

## 2018-11-06 PROBLEM — E66.01 CLASS 3 SEVERE OBESITY WITH BODY MASS INDEX (BMI) OF 40.0 TO 44.9 IN ADULT (HCC): Status: ACTIVE | Noted: 2018-01-18

## 2018-11-06 LAB
ESTIMATED AVERAGE GLUCOSE: 142.7 MG/DL
HBA1C MFR BLD: 6.6 %

## 2018-11-07 ENCOUNTER — OFFICE VISIT (OUTPATIENT)
Dept: ENDOCRINOLOGY | Age: 63
End: 2018-11-07
Payer: COMMERCIAL

## 2018-11-07 VITALS
BODY MASS INDEX: 40.36 KG/M2 | DIASTOLIC BLOOD PRESSURE: 78 MMHG | HEART RATE: 82 BPM | HEIGHT: 63 IN | OXYGEN SATURATION: 96 % | WEIGHT: 227.8 LBS | SYSTOLIC BLOOD PRESSURE: 130 MMHG

## 2018-11-07 DIAGNOSIS — Z98.84 STATUS POST GASTRIC BANDING: Chronic | ICD-10-CM

## 2018-11-07 DIAGNOSIS — E04.1 THYROID NODULE: ICD-10-CM

## 2018-11-07 DIAGNOSIS — E78.2 MIXED HYPERLIPIDEMIA: ICD-10-CM

## 2018-11-07 DIAGNOSIS — E03.9 ACQUIRED HYPOTHYROIDISM: Primary | Chronic | ICD-10-CM

## 2018-11-07 DIAGNOSIS — E66.01 CLASS 3 SEVERE OBESITY WITH BODY MASS INDEX (BMI) OF 40.0 TO 44.9 IN ADULT, UNSPECIFIED OBESITY TYPE, UNSPECIFIED WHETHER SERIOUS COMORBIDITY PRESENT (HCC): ICD-10-CM

## 2018-11-07 DIAGNOSIS — R73.03 PREDIABETES: Chronic | ICD-10-CM

## 2018-11-07 PROCEDURE — 99214 OFFICE O/P EST MOD 30 MIN: CPT | Performed by: INTERNAL MEDICINE

## 2018-11-07 RX ORDER — LEVOTHYROXINE SODIUM 112 UG/1
112 TABLET ORAL DAILY
Qty: 90 TABLET | Refills: 1 | Status: SHIPPED | OUTPATIENT
Start: 2018-11-07 | End: 2019-02-04 | Stop reason: SDUPTHER

## 2018-11-07 RX ORDER — ATORVASTATIN CALCIUM 10 MG/1
10 TABLET, FILM COATED ORAL DAILY
Qty: 90 TABLET | Refills: 1 | Status: SHIPPED | OUTPATIENT
Start: 2018-11-07 | End: 2019-02-04 | Stop reason: SDUPTHER

## 2018-11-07 NOTE — PROGRESS NOTES
Patient Active Problem List    Diagnosis Date Noted    Hypothyroidism 10/21/2016     Priority: High     Class: Chronic    Hyperlipidemia 02/05/2010     Priority: High     Class: Chronic    Hyperglycemia 10/21/2016     Priority: Medium     Class: Chronic    Prediabetes 10/21/2016     Priority: Medium     Class: Chronic    Elevated liver function tests 10/21/2016     Priority: Medium     Class: Chronic    Reina's neuroma of left foot 10/21/2016     Priority: Low     Class: Chronic    Snoring 10/21/2016     Priority: Low     Class: Chronic    Obstructive sleep apnea 10/21/2016     Priority: Low     Class: Chronic    Morbid obesity due to excess calories ([facility] [address] 10/21/2016     Priority: Low     Class: Chronic    Non morbid obesity due to excess calories 09/12/2016     Priority: Low     Class: Chronic    History of cystocele 09/12/2016     Priority: Low     Class: Chronic    Rectocele 09/12/2016     Priority: Low     Class: Chronic    Uterovaginal prolapse 09/12/2016     Priority: Low     Class: Chronic    Status post gastric banding 04/26/2010     Priority: Low     Class: Chronic    Thyroid nodule 08/20/2018    Class 3 severe obesity with body mass index (BMI) of 40.0 to 44.9 in adult ([facility] [address] 01/18/2018    Goiter 01/18/2018    Numbness and tingling of both upper extremities while sleeping 06/16/2017    Arthritis of right acromioclavicular joint 06/16/2017    Pain radiating to neck 06/16/2017    Biceps tendinitis on right 06/16/2017    Bursitis of right shoulder 06/16/2017    Tendinitis of right rotator cuff 06/16/2017    Chronic right shoulder pain 06/16/2017    Pain in right upper arm 06/12/2017    Seasonal allergic rhinitis 12/12/2016     Class: Chronic    Essential hypertension 12/12/2016     Class: Chronic    Abnormal weight gain 09/12/2016    Arthritis 02/05/2010    Asthma 02/05/2010     Class: Chronic     Past Surgical History:   Procedure Laterality Date    ABDOMINOPLASTY  1996

## 2019-01-24 DIAGNOSIS — R73.03 PREDIABETES: Chronic | ICD-10-CM

## 2019-01-24 DIAGNOSIS — E78.2 MIXED HYPERLIPIDEMIA: ICD-10-CM

## 2019-01-24 DIAGNOSIS — E03.9 ACQUIRED HYPOTHYROIDISM: Chronic | ICD-10-CM

## 2019-01-24 LAB
A/G RATIO: 1.2 (ref 1.1–2.2)
ALBUMIN SERPL-MCNC: 4.3 G/DL (ref 3.4–5)
ALP BLD-CCNC: 86 U/L (ref 40–129)
ALT SERPL-CCNC: 41 U/L (ref 10–40)
ANION GAP SERPL CALCULATED.3IONS-SCNC: 12 MMOL/L (ref 3–16)
AST SERPL-CCNC: 33 U/L (ref 15–37)
BILIRUB SERPL-MCNC: 0.3 MG/DL (ref 0–1)
BUN BLDV-MCNC: 9 MG/DL (ref 7–20)
CALCIUM SERPL-MCNC: 9.7 MG/DL (ref 8.3–10.6)
CHLORIDE BLD-SCNC: 105 MMOL/L (ref 99–110)
CHOLESTEROL, TOTAL: 129 MG/DL (ref 0–199)
CO2: 27 MMOL/L (ref 21–32)
CREAT SERPL-MCNC: <0.5 MG/DL (ref 0.6–1.2)
CREATININE URINE: 78.2 MG/DL (ref 28–259)
GFR AFRICAN AMERICAN: >60
GFR NON-AFRICAN AMERICAN: >60
GLOBULIN: 3.7 G/DL
GLUCOSE BLD-MCNC: 95 MG/DL (ref 70–99)
HDLC SERPL-MCNC: 39 MG/DL (ref 40–60)
LDL CHOLESTEROL CALCULATED: 74 MG/DL
MICROALBUMIN UR-MCNC: 1.9 MG/DL
MICROALBUMIN/CREAT UR-RTO: 24.3 MG/G (ref 0–30)
POTASSIUM SERPL-SCNC: 4.3 MMOL/L (ref 3.5–5.1)
SODIUM BLD-SCNC: 144 MMOL/L (ref 136–145)
T4 FREE: 1.4 NG/DL (ref 0.9–1.8)
TOTAL PROTEIN: 8 G/DL (ref 6.4–8.2)
TRIGL SERPL-MCNC: 79 MG/DL (ref 0–150)
TSH SERPL DL<=0.05 MIU/L-ACNC: 0.61 UIU/ML (ref 0.27–4.2)
VLDLC SERPL CALC-MCNC: 16 MG/DL

## 2019-01-25 LAB
ESTIMATED AVERAGE GLUCOSE: 122.6 MG/DL
HBA1C MFR BLD: 5.9 %

## 2019-01-29 LAB — C-PEPTIDE: 3.1 NG/ML (ref 1.1–4.4)

## 2019-02-04 ENCOUNTER — OFFICE VISIT (OUTPATIENT)
Dept: ENDOCRINOLOGY | Age: 64
End: 2019-02-04
Payer: COMMERCIAL

## 2019-02-04 VITALS
WEIGHT: 220.8 LBS | HEIGHT: 63 IN | BODY MASS INDEX: 39.12 KG/M2 | HEART RATE: 76 BPM | DIASTOLIC BLOOD PRESSURE: 78 MMHG | SYSTOLIC BLOOD PRESSURE: 132 MMHG

## 2019-02-04 DIAGNOSIS — E03.9 ACQUIRED HYPOTHYROIDISM: Primary | Chronic | ICD-10-CM

## 2019-02-04 DIAGNOSIS — E04.1 THYROID NODULE: ICD-10-CM

## 2019-02-04 DIAGNOSIS — E66.9 CLASS 2 OBESITY WITH BODY MASS INDEX (BMI) OF 39.0 TO 39.9 IN ADULT, UNSPECIFIED OBESITY TYPE, UNSPECIFIED WHETHER SERIOUS COMORBIDITY PRESENT: ICD-10-CM

## 2019-02-04 DIAGNOSIS — R73.03 PREDIABETES: Chronic | ICD-10-CM

## 2019-02-04 DIAGNOSIS — Z98.84 STATUS POST GASTRIC BANDING: Chronic | ICD-10-CM

## 2019-02-04 DIAGNOSIS — E78.2 MIXED HYPERLIPIDEMIA: ICD-10-CM

## 2019-02-04 PROCEDURE — 99214 OFFICE O/P EST MOD 30 MIN: CPT | Performed by: INTERNAL MEDICINE

## 2019-02-04 RX ORDER — BLOOD-GLUCOSE METER
1 EACH MISCELLANEOUS ONCE
Qty: 1 KIT | Refills: 0 | Status: SHIPPED | OUTPATIENT
Start: 2019-02-04 | End: 2019-09-16 | Stop reason: SDUPTHER

## 2019-02-04 RX ORDER — ATORVASTATIN CALCIUM 10 MG/1
10 TABLET, FILM COATED ORAL DAILY
Qty: 90 TABLET | Refills: 1 | Status: SHIPPED | OUTPATIENT
Start: 2019-02-04 | End: 2019-04-30 | Stop reason: SDUPTHER

## 2019-02-04 RX ORDER — LEVOTHYROXINE SODIUM 112 UG/1
112 TABLET ORAL DAILY
Qty: 90 TABLET | Refills: 1 | Status: SHIPPED | OUTPATIENT
Start: 2019-02-04 | End: 2019-04-30 | Stop reason: SDUPTHER

## 2019-02-04 RX ORDER — LANCETS 33 GAUGE
1 EACH MISCELLANEOUS 2 TIMES DAILY
Qty: 60 EACH | Refills: 5 | Status: SHIPPED | OUTPATIENT
Start: 2019-02-04 | End: 2019-09-16 | Stop reason: SDUPTHER

## 2019-04-28 DIAGNOSIS — E78.2 MIXED HYPERLIPIDEMIA: ICD-10-CM

## 2019-04-28 DIAGNOSIS — E03.9 ACQUIRED HYPOTHYROIDISM: Chronic | ICD-10-CM

## 2019-04-30 RX ORDER — ATORVASTATIN CALCIUM 10 MG/1
TABLET, FILM COATED ORAL
Qty: 90 TABLET | Refills: 0 | Status: SHIPPED | OUTPATIENT
Start: 2019-04-30 | End: 2019-07-19 | Stop reason: SDUPTHER

## 2019-04-30 RX ORDER — LEVOTHYROXINE SODIUM 112 UG/1
TABLET ORAL
Qty: 90 TABLET | Refills: 0 | Status: SHIPPED | OUTPATIENT
Start: 2019-04-30 | End: 2019-07-18 | Stop reason: SDUPTHER

## 2019-04-30 NOTE — TELEPHONE ENCOUNTER
LOV: 2/4/19  NOV: 5/6/19    atorvastatin  Dose: 1 tablet daily  Strength: 10 mg  Route: Oral       levothyroxine  Dose: 1 tablet daily  Strength: 112 mcg  Route: Oral

## 2019-05-24 DIAGNOSIS — E78.2 MIXED HYPERLIPIDEMIA: ICD-10-CM

## 2019-05-24 DIAGNOSIS — R73.03 PREDIABETES: Chronic | ICD-10-CM

## 2019-05-24 DIAGNOSIS — E03.9 ACQUIRED HYPOTHYROIDISM: Chronic | ICD-10-CM

## 2019-05-24 LAB
CHOLESTEROL, TOTAL: 135 MG/DL (ref 0–199)
HDLC SERPL-MCNC: 40 MG/DL (ref 40–60)
LDL CHOLESTEROL CALCULATED: 70 MG/DL
T4 FREE: 1.3 NG/DL (ref 0.9–1.8)
TRIGL SERPL-MCNC: 127 MG/DL (ref 0–150)
TSH SERPL DL<=0.05 MIU/L-ACNC: 2.65 UIU/ML (ref 0.27–4.2)
VLDLC SERPL CALC-MCNC: 25 MG/DL

## 2019-05-25 LAB
ESTIMATED AVERAGE GLUCOSE: 134.1 MG/DL
HBA1C MFR BLD: 6.3 %

## 2019-05-29 PROBLEM — E66.9 CLASS 2 OBESITY WITH BODY MASS INDEX (BMI) OF 39.0 TO 39.9 IN ADULT: Status: ACTIVE | Noted: 2018-01-18

## 2019-05-30 ENCOUNTER — OFFICE VISIT (OUTPATIENT)
Dept: ENDOCRINOLOGY | Age: 64
End: 2019-05-30
Payer: COMMERCIAL

## 2019-05-30 VITALS
HEIGHT: 63 IN | SYSTOLIC BLOOD PRESSURE: 130 MMHG | WEIGHT: 230 LBS | OXYGEN SATURATION: 97 % | BODY MASS INDEX: 40.75 KG/M2 | HEART RATE: 81 BPM | DIASTOLIC BLOOD PRESSURE: 80 MMHG

## 2019-05-30 DIAGNOSIS — R73.03 PREDIABETES: Chronic | ICD-10-CM

## 2019-05-30 DIAGNOSIS — E03.9 ACQUIRED HYPOTHYROIDISM: Primary | Chronic | ICD-10-CM

## 2019-05-30 DIAGNOSIS — E78.2 MIXED HYPERLIPIDEMIA: Chronic | ICD-10-CM

## 2019-05-30 DIAGNOSIS — E66.9 CLASS 2 OBESITY WITH BODY MASS INDEX (BMI) OF 39.0 TO 39.9 IN ADULT, UNSPECIFIED OBESITY TYPE, UNSPECIFIED WHETHER SERIOUS COMORBIDITY PRESENT: ICD-10-CM

## 2019-05-30 DIAGNOSIS — E04.1 THYROID NODULE: ICD-10-CM

## 2019-05-30 DIAGNOSIS — Z98.84 STATUS POST GASTRIC BANDING: Chronic | ICD-10-CM

## 2019-05-30 PROCEDURE — 99214 OFFICE O/P EST MOD 30 MIN: CPT | Performed by: INTERNAL MEDICINE

## 2019-05-30 NOTE — PROGRESS NOTES
SUBJECTIVE:  Nesha Ibrahim is a 66 y.o. female who is here for hypothyroidism, weight gain. 1. Acquired hypothyroidism    This started in 2017. Patient was diagnosed with hypothyroidism. The problem has been gradually worsening. Patient started medication in 2017. Currently patient is on: levothyroxine. Misses  0 doses a month. Current complaints: fatigue, constipation, palpitations. 2. Class 2 obesity with serious comorbidity and body mass index (BMI) of 40.0 to 40.9 in adult, unspecified obesity type ([address]  Fluctuating weight. Eats healthy. Has neuroma, asthma. Difficult to exercise.  for [employer]. 3. Prediabetes  Eats healthy. HbA1C 6.2-6.5-6.6-5.9    4. Status post gastric banding  Had lap banding, lost 100 lbs, then started gaining again. Now not in weight watchers. 5. Thyroid nodule  Right 7 mm nodule. No difficulty swallowing  History of obstructive symptoms: difficulty swallowing Yes, changes in voice/hoarseness Yes. History of radiation to patient's neck: No  Resent iodine exposure: No  Family history includes no thyroid abnormalities. Family history of thyroid cancer: No    6.  Hyperlipidemia  No muscle pain    Past Medical History:   Diagnosis Date    Asthma     Elevated liver function tests 10/21/2016    Essential hypertension 12/12/2016    GERD (gastroesophageal reflux disease)     History of cystocele 9/12/2016    Hyperglycemia 10/21/2016    Hyperlipidemia     Hypertension     Hypothyroidism 10/21/2016    Migraine     Morbid obesity due to excess calories ([address] 10/21/2016    Reina's neuroma of left foot 10/21/2016    Non morbid obesity due to excess calories 9/12/2016    Obesity     Obstructive sleep apnea 10/21/2016    Osteoarthritis     Prediabetes 10/21/2016    Rectocele 9/12/2016    Seasonal allergic rhinitis 12/12/2016    Snoring 10/21/2016    Status post gastric banding 4/26/2010    Uterovaginal prolapse 9/12/2016     Patient Active Problem List    Diagnosis Date Noted    Hypothyroidism 10/21/2016     Priority: High     Class: Chronic    Hyperlipidemia 02/05/2010     Priority: High     Class: Chronic    Hyperglycemia 10/21/2016     Priority: Medium     Class: Chronic    Prediabetes 10/21/2016     Priority: Medium     Class: Chronic    Elevated liver function tests 10/21/2016     Priority: Medium     Class: Chronic    Reina's neuroma of left foot 10/21/2016     Priority: Low     Class: Chronic    Snoring 10/21/2016     Priority: Low     Class: Chronic    Obstructive sleep apnea 10/21/2016     Priority: Low     Class: Chronic    Morbid obesity due to excess calories ([address] 10/21/2016     Priority: Low     Class: Chronic    Non morbid obesity due to excess calories 09/12/2016     Priority: Low     Class: Chronic    History of cystocele 09/12/2016     Priority: Low     Class: Chronic    Rectocele 09/12/2016     Priority: Low     Class: Chronic    Uterovaginal prolapse 09/12/2016     Priority: Low     Class: Chronic    Status post gastric banding 04/26/2010     Priority: Low     Class: Chronic    Thyroid nodule 08/20/2018    Class 2 obesity with body mass index (BMI) of 39.0 to 39.9 in adult 01/18/2018    Numbness and tingling of both upper extremities while sleeping 06/16/2017    Arthritis of right acromioclavicular joint 06/16/2017    Pain radiating to neck 06/16/2017    Biceps tendinitis on right 06/16/2017    Bursitis of right shoulder 06/16/2017    Tendinitis of right rotator cuff 06/16/2017    Chronic right shoulder pain 06/16/2017    Pain in right upper arm 06/12/2017    Seasonal allergic rhinitis 12/12/2016     Class: Chronic    Essential hypertension 12/12/2016     Class: Chronic    Abnormal weight gain 09/12/2016    Arthritis 02/05/2010    Asthma 02/05/2010     Class: Chronic     Past Surgical History:   Procedure Laterality Date    ABDOMINOPLASTY  1996    [address] 09/21/2012    Dr. Francesca Fam, [facility]      COLONOSCOPY  12/04/2013    Dr. Bertha Ruano    CYSTOSCOPY  09/21/2012    Dr. René Zambrano Left 10/25/2016    Dr. Gabriella Fuentes, VAGINAL  09/21/2012    Dr. Kellie Rosario    LAP BAND  01/15/2010    Dr. Luis Carlos Woods Left 07/26/2010    Dr. Margie Wren - incisional biopsy of left tonsillar mass    [place]ONS[place]OMY  1980    [place]    UPPER GASTROINTESTINAL ENDOSCOPY  01/23/2015    Dr. Sherry Adams  10/16/2010    Dr. Shimon Márquez    work related injury, removed a bone     Family History   Problem Relation Age of Onset    Cancer Mother     Diabetes Father     High Blood Pressure Father     High Cholesterol Father     Heart Disease Father     Asthma Other     Uterine Cancer Other     Heart Disease Other     Depression Other     Diabetes Other     High Cholesterol Other     High Blood Pressure Other     Kidney Disease Other     Migraines Other     Stroke Other     Arthritis Other     No Known Problems Daughter     No Known Problems Daughter     No Known Problems Daughter     No Known Problems Daughter     No Known Problems Daughter     Other Sister         Brain tumor Surgery-didn't wake up    Diabetes Brother     No Known Problems Sister     No Known Problems Sister     Cancer Brother         cancer    Cancer Brother         bone    No Known Problems Brother     No Known Problems Brother     No Known Problems Brother     No Known Problems Brother     No Known Problems Brother     No Known Problems Son      Social History     Socioeconomic History    Marital status:      Spouse name: Eusebio Peck  Number of children: 10    Years of education: None    Highest education level: None   Occupational History    Occupation:  and cleans [employer]     Comment: as of September 12, 2016   Social Needs    Financial resource strain: None    Food insecurity:     Worry: None     Inability: None    Transportation needs:     Medical: None     Non-medical: None   Tobacco Use    Smoking status: Never Smoker    Smokeless tobacco: Never Used   Substance and Sexual Activity    Alcohol use: No    Drug use: No    Sexual activity: Yes     Partners: Male     Comment:  - Rene - 1978   Lifestyle    Physical activity:     Days per week: None     Minutes per session: None    Stress: None   Relationships    Social connections:     Talks on phone: None     Gets together: None     Attends Baptism service: None     Active member of club or organization: None     Attends meetings of clubs or organizations: None     Relationship status: None    Intimate partner violence:     Fear of current or ex partner: None     Emotionally abused: None     Physically abused: None     Forced sexual activity: None   Other Topics Concern    None   Social History Narrative    None     Current Outpatient Medications   Medication Sig Dispense Refill    levothyroxine (SYNTHROID) 112 MCG tablet TAKE 1 TABLET BY MOUTH ONE TIME A DAY  90 tablet 0    atorvastatin (LIPITOR) 10 MG tablet TAKE 1 TABLET BY MOUTH ONE TIME A DAY  90 tablet 0    metFORMIN (GLUCOPHAGE) 500 MG tablet Take 2 tablets by mouth 2 times daily (with meals) 360 tablet 1    blood glucose test strips (ONETOUCH VERIO) strip 1 each by In Vitro route 2 times daily 100 each 3    ONETOUCH DELICA LANCETS 06C MISC 1 each by Does not apply route 2 times daily 60 each 5    Compression Stockings MISC by Does not apply route Compression 30 mm Hg. 1 each 0    albuterol sulfate HFA (PROAIR HFA) 108 (90 BASE) MCG/ACT inhaler Inhale 2 puffs into the lungs every 6 hours as needed for Wheezing or Shortness of Breath      fexofenadine (ALLEGRA) 180 MG tablet Take 1 tablet by mouth daily as needed (for allergies)      Blood Glucose Monitoring Suppl (ONETOUCH VERIO FLEX SYSTEM) w/Device KIT 1 Device by Does not apply route once for 1 dose 1 kit 0    meloxicam (MOBIC) 15 MG tablet Take 1 tablet by mouth daily Take one tab 15 mg by mouth daily with food for left hip pain. 30 tablet 2     No current facility-administered medications for this visit. Allergies   Allergen Reactions    Latex Rash    Kiwi Extract      LIPS ITCH    Other Itching     Itchy lips and mouth with fruits-watermelon, peaches,meloln, avocado    Penicillins      Other reaction(s):  Other (See Comments)  unsure     Family Status   Relation Name Status    Mother      Father     [facility] Other  (Not Specified)   [address] [place]    Sister      Brother  Alive    Sister  Alive    Sister  Alive    Brother     [address]    Son [facility] [employer]       Review of Systems:  Constitutional: has fatigue, no fever, no recent weight gain, has intentional recent weight loss, no changes in appetite  Eyes: no eye pain, no change in vision, no eye redness, no eye irritation, no double vision  Ears, nose, throat: no nasal congestion, no sore throat, no earache, no decrease in hearing, has hoarseness, no dry mouth, no sinus problems, no difficulty swallowing, no neck lumps, no dental problems, no mouth sores, no ringing in ears  Pulmonary: has shortness of breath, no wheezing, no dyspnea on exertion, has cough  Cardiovascular: has chest pain, has lower extremity edema, no orthopnea, no intermittent leg claudication, has palpitations  Gastrointestinal: no abdominal pain, no nausea, no vomiting, no diarrhea, no constipation, no heartburn, no bloating  Genitourinary: no dysuria, no urinary incontinence, no urinary hesitancy, no change in urinary frequency, no feelings of urinary urgency, no nocturia  Musculoskeletal: no joint swelling, no joint stiffness, has joint pain, no muscle cramps, no muscle pain, no bone pain  Integument/Breast: no hair loss, no skin rashes, no skin lesions, no itching, no dry skin  Neurological: has numbness, no tingling, no weakness, no confusion, no headaches, no dizziness, no fainting, no tremors, no decrease in memory, no balance problems  Psychiatric: no anxiety, no depression, no insomnia  Hematologic/Lymphatic: no tendency for easy bleeding, no swollen lymph nodes, no tendency for easy bruising  Immunology: no seasonal allergies, no frequent infections, no frequent illnesses  Endocrine: no temperature intolerance, has hot flashes, no hand tremor    OBJECTIVE:   /80 (Site: Left Upper Arm, Position: Sitting, Cuff Size: Medium Adult)   Pulse 81   Ht 5' 3\" (1.6 m)   Wt 230 lb (104.3 kg)   SpO2 97%   BMI 40.74 kg/m²   Wt Readings from Last 3 Encounters:   05/30/19 230 lb (104.3 kg)   02/04/19 220 lb 12.8 oz (100.2 kg)   11/07/18 227 lb 12.8 oz (103.3 kg)       Physical Exam:  Constitutional: no acute distress, well appearing, well nourished  Psychiatric: oriented to person, place and time, judgement, insight and normal, recent and remote memory and intact and mood, affect are normal  Skin: skin and subcutaneous tissue is normal without mass, normal turgor  Head and Face: examination of head and face revealed no abnormalities  Eyes: no lid or conjunctival swelling, no erythema or discharge, pupils are normal, equal, round, and reactive to light  Ears/Nose: external inspection of ears and nose revealed no abnormalities, hearing is grossly normal  Oropharynx/Mouth/Face: lips, tongue and gums are normal with no lesions, the voice quality was normal  Neck: neck is supple and symmetric, with midline trachea

## 2019-07-18 DIAGNOSIS — E78.2 MIXED HYPERLIPIDEMIA: ICD-10-CM

## 2019-07-18 DIAGNOSIS — E03.9 ACQUIRED HYPOTHYROIDISM: Chronic | ICD-10-CM

## 2019-07-19 RX ORDER — LEVOTHYROXINE SODIUM 112 UG/1
TABLET ORAL
Qty: 90 TABLET | Refills: 0 | Status: SHIPPED | OUTPATIENT
Start: 2019-07-19 | End: 2019-09-05 | Stop reason: SDUPTHER

## 2019-07-19 RX ORDER — ATORVASTATIN CALCIUM 10 MG/1
TABLET, FILM COATED ORAL
Qty: 90 TABLET | Refills: 0 | Status: SHIPPED | OUTPATIENT
Start: 2019-07-19 | End: 2019-09-05 | Stop reason: SDUPTHER

## 2019-08-19 ENCOUNTER — OFFICE VISIT (OUTPATIENT)
Dept: FAMILY MEDICINE CLINIC | Age: 64
End: 2019-08-19
Payer: COMMERCIAL

## 2019-08-19 VITALS
BODY MASS INDEX: 41.42 KG/M2 | TEMPERATURE: 97.9 F | DIASTOLIC BLOOD PRESSURE: 64 MMHG | SYSTOLIC BLOOD PRESSURE: 122 MMHG | OXYGEN SATURATION: 94 % | WEIGHT: 233.8 LBS | HEART RATE: 70 BPM | RESPIRATION RATE: 19 BRPM

## 2019-08-19 DIAGNOSIS — G56.02 CARPAL TUNNEL SYNDROME OF LEFT WRIST: Primary | ICD-10-CM

## 2019-08-19 DIAGNOSIS — M70.62 TROCHANTERIC BURSITIS OF LEFT HIP: ICD-10-CM

## 2019-08-19 DIAGNOSIS — M76.30 ILIOTIBIAL BAND SYNDROME, UNSPECIFIED LATERALITY: ICD-10-CM

## 2019-08-19 PROCEDURE — 99214 OFFICE O/P EST MOD 30 MIN: CPT | Performed by: FAMILY MEDICINE

## 2019-08-19 RX ORDER — NAPROXEN 500 MG/1
500 TABLET ORAL 2 TIMES DAILY WITH MEALS
Qty: 180 TABLET | Refills: 0 | Status: SHIPPED | OUTPATIENT
Start: 2019-08-19 | End: 2020-10-12 | Stop reason: ALTCHOICE

## 2019-08-19 NOTE — PROGRESS NOTES
Carpal tunnel syndrome of left wrist  - advised to use wrist splint at night.  - naproxen (NAPROSYN) 500 MG tablet; Take 1 tablet by mouth 2 times daily (with meals)  Dispense: 180 tablet; Refill: 0  - Elastic Bandages & Supports (WRIST SPLINT/COCK-UP/LEFT L) MISC; DX: CTS left. Dispense: 1 each; Refill: 0    2. Trochanteric bursitis of left hip  - Moist heat . ROM exercises- handouts given. Modify activities. - naproxen (NAPROSYN) 500 MG tablet; Take 1 tablet by mouth 2 times daily (with meals)  Dispense: 180 tablet; Refill: 0  - hold on Physical Therapy referral.     3. Iliotibial band syndrome, unspecified laterality  - Moist heat . ROM exercises- handouts given. Modify activities. - naproxen (NAPROSYN) 500 MG tablet; Take 1 tablet by mouth 2 times daily (with meals)  Dispense: 180 tablet; Refill: 0       Follow up if no improvement in 6-8 weeks/ as needed for increased symptoms.

## 2019-08-29 DIAGNOSIS — E03.9 ACQUIRED HYPOTHYROIDISM: Chronic | ICD-10-CM

## 2019-08-29 DIAGNOSIS — R73.03 PREDIABETES: Chronic | ICD-10-CM

## 2019-08-29 DIAGNOSIS — E78.2 MIXED HYPERLIPIDEMIA: Chronic | ICD-10-CM

## 2019-08-29 LAB
A/G RATIO: 1 (ref 1.1–2.2)
ALBUMIN SERPL-MCNC: 4 G/DL (ref 3.4–5)
ALP BLD-CCNC: 108 U/L (ref 40–129)
ALT SERPL-CCNC: 51 U/L (ref 10–40)
ANION GAP SERPL CALCULATED.3IONS-SCNC: 13 MMOL/L (ref 3–16)
AST SERPL-CCNC: 41 U/L (ref 15–37)
BILIRUB SERPL-MCNC: <0.2 MG/DL (ref 0–1)
BUN BLDV-MCNC: 14 MG/DL (ref 7–20)
CALCIUM SERPL-MCNC: 9.5 MG/DL (ref 8.3–10.6)
CHLORIDE BLD-SCNC: 103 MMOL/L (ref 99–110)
CHOLESTEROL, TOTAL: 139 MG/DL (ref 0–199)
CO2: 25 MMOL/L (ref 21–32)
CREAT SERPL-MCNC: <0.5 MG/DL (ref 0.6–1.2)
ESTIMATED AVERAGE GLUCOSE: 151.3 MG/DL
GFR AFRICAN AMERICAN: >60
GFR NON-AFRICAN AMERICAN: >60
GLOBULIN: 4 G/DL
GLUCOSE BLD-MCNC: 125 MG/DL (ref 70–99)
HBA1C MFR BLD: 6.9 %
HDLC SERPL-MCNC: 41 MG/DL (ref 40–60)
LDL CHOLESTEROL CALCULATED: 73 MG/DL
POTASSIUM SERPL-SCNC: 4.4 MMOL/L (ref 3.5–5.1)
SODIUM BLD-SCNC: 141 MMOL/L (ref 136–145)
T4 FREE: 1.1 NG/DL (ref 0.9–1.8)
TOTAL PROTEIN: 8 G/DL (ref 6.4–8.2)
TRIGL SERPL-MCNC: 123 MG/DL (ref 0–150)
TSH SERPL DL<=0.05 MIU/L-ACNC: 1.14 UIU/ML (ref 0.27–4.2)
VLDLC SERPL CALC-MCNC: 25 MG/DL

## 2019-09-05 ENCOUNTER — OFFICE VISIT (OUTPATIENT)
Dept: ENDOCRINOLOGY | Age: 64
End: 2019-09-05
Payer: COMMERCIAL

## 2019-09-05 VITALS
DIASTOLIC BLOOD PRESSURE: 73 MMHG | HEART RATE: 73 BPM | OXYGEN SATURATION: 98 % | HEIGHT: 63 IN | WEIGHT: 236.6 LBS | BODY MASS INDEX: 41.92 KG/M2 | SYSTOLIC BLOOD PRESSURE: 133 MMHG

## 2019-09-05 DIAGNOSIS — E11.9 CONTROLLED TYPE 2 DIABETES MELLITUS WITHOUT COMPLICATION, WITHOUT LONG-TERM CURRENT USE OF INSULIN (HCC): ICD-10-CM

## 2019-09-05 DIAGNOSIS — E78.2 MIXED HYPERLIPIDEMIA: Chronic | ICD-10-CM

## 2019-09-05 DIAGNOSIS — E04.1 THYROID NODULE: ICD-10-CM

## 2019-09-05 DIAGNOSIS — E03.9 ACQUIRED HYPOTHYROIDISM: Primary | Chronic | ICD-10-CM

## 2019-09-05 DIAGNOSIS — R73.03 PREDIABETES: Chronic | ICD-10-CM

## 2019-09-05 PROCEDURE — 99215 OFFICE O/P EST HI 40 MIN: CPT | Performed by: INTERNAL MEDICINE

## 2019-09-05 RX ORDER — ATORVASTATIN CALCIUM 10 MG/1
TABLET, FILM COATED ORAL
Qty: 90 TABLET | Refills: 0 | Status: SHIPPED | OUTPATIENT
Start: 2019-09-05 | End: 2019-12-09

## 2019-09-05 RX ORDER — LEVOTHYROXINE SODIUM 112 UG/1
TABLET ORAL
Qty: 90 TABLET | Refills: 0 | Status: SHIPPED | OUTPATIENT
Start: 2019-09-05 | End: 2019-12-09

## 2019-09-05 NOTE — PROGRESS NOTES
9/12/2016     Patient Active Problem List    Diagnosis Date Noted    Hypothyroidism 10/21/2016     Priority: High     Class: Chronic    Hyperlipidemia 02/05/2010     Priority: High     Class: Chronic    Hyperglycemia 10/21/2016     Priority: Medium     Class: Chronic    Diabetes mellitus type 2, controlled, without complications ([facility] [address] 19/69/9535     Priority: Medium     Class: Chronic    Elevated liver function tests 10/21/2016     Priority: Medium     Class: Chronic    Reina's neuroma of left foot 10/21/2016     Priority: Low     Class: Chronic    Snoring 10/21/2016     Priority: Low     Class: Chronic    Obstructive sleep apnea 10/21/2016     Priority: Low     Class: Chronic    Morbid obesity due to excess calories [facility] 10/21/2016     Priority: Low     Class: Chronic    Non morbid obesity due to excess calories 09/12/2016     Priority: Low     Class: Chronic    History of cystocele 09/12/2016     Priority: Low     Class: Chronic    Rectocele 09/12/2016     Priority: Low     Class: Chronic    Uterovaginal prolapse 09/12/2016     Priority: Low     Class: Chronic    Status post gastric banding 04/26/2010     Priority: Low     Class: Chronic    Thyroid nodule 08/20/2018    Class 3 severe obesity with body mass index (BMI) of 40.0 to 44.9 in adult ([facility] [address] 01/18/2018    Numbness and tingling of both upper extremities while sleeping 06/16/2017    Arthritis of right acromioclavicular joint 06/16/2017    Pain radiating to neck 06/16/2017    Biceps tendinitis on right 06/16/2017    Bursitis of right shoulder 06/16/2017    Tendinitis of right rotator cuff 06/16/2017    Chronic right shoulder pain 06/16/2017    Pain in right upper arm 06/12/2017    Seasonal allergic rhinitis 12/12/2016     Class: Chronic    Essential hypertension 12/12/2016     Class: Chronic    Arthritis 02/05/2010    Asthma 02/05/2010     Class: Chronic     Past Surgical History:   Procedure Laterality Date    ABDOMINOPLASTY FLEX SYSTEM) w/Device KIT 1 Device by Does not apply route once for 1 dose 1 kit 0    ONETOUCH DELICA LANCETS 99U MISC 1 each by Does not apply route 2 times daily 60 each 5    Compression Stockings MISC by Does not apply route Compression 30 mm Hg. 1 each 0    albuterol sulfate HFA (PROAIR HFA) 108 (90 BASE) MCG/ACT inhaler Inhale 2 puffs into the lungs every 6 hours as needed for Wheezing or Shortness of Breath      fexofenadine (ALLEGRA) 180 MG tablet Take 1 tablet by mouth daily as needed (for allergies)      meloxicam (MOBIC) 15 MG tablet Take 1 tablet by mouth daily Take one tab 15 mg by mouth daily with food for left hip pain. 30 tablet 2     No current facility-administered medications for this visit. Allergies   Allergen Reactions    Latex Rash    Fruit & Vegetable Daily [Nutritional Supplements] Other (See Comments)     Vomiting, onion, tomato, watermelon, peach    Kiwi Extract      LIPS ITCH    Other Itching     Itchy lips and mouth with fruits-watermelon, peaches,meloln, avocado    Penicillins      Other reaction(s):  Other (See Comments)  unsure     Family Status   Relation Name Status    Mother      Father     Nataliia Stoll Other  (Not Specified)   [address] [place]    Sister      Brother  Alive    Sister  Alive    Sister  Alive    Brother      Brother  Alive    Brother  Alive    Brother  Alive    Brother  Alive    Brother  Alive    Brother  Alive    Son [facility] [employer]       Review of Systems:  Constitutional: has fatigue, no fever, no recent weight gain, has intentional recent weight loss, no changes in appetite  Eyes: no eye pain, no change in vision, no eye redness, no eye irritation, no double vision  Ears, nose, throat: no nasal congestion, no sore throat, no earache, no decrease in hearing, has hoarseness, no dry mouth, no sinus problems, no difficulty swallowing, no neck

## 2019-09-16 ENCOUNTER — TELEPHONE (OUTPATIENT)
Dept: ENDOCRINOLOGY | Age: 64
End: 2019-09-16

## 2019-09-16 ENCOUNTER — HOSPITAL ENCOUNTER (OUTPATIENT)
Dept: ULTRASOUND IMAGING | Age: 64
Discharge: HOME | End: 2019-09-16
Payer: COMMERCIAL

## 2019-09-16 DIAGNOSIS — E04.1 THYROID NODULE: ICD-10-CM

## 2019-09-16 PROCEDURE — 76536 US EXAM OF HEAD AND NECK: CPT

## 2019-09-16 RX ORDER — BLOOD SUGAR DIAGNOSTIC
1 STRIP MISCELLANEOUS 2 TIMES DAILY
Qty: 100 EACH | Refills: 3 | Status: SHIPPED | OUTPATIENT
Start: 2019-09-16

## 2019-09-16 RX ORDER — BLOOD-GLUCOSE METER
1 EACH MISCELLANEOUS ONCE
Qty: 1 KIT | Refills: 0 | Status: SHIPPED | OUTPATIENT
Start: 2019-09-16 | End: 2022-10-20

## 2019-09-16 RX ORDER — LANCETS 33 GAUGE
1 EACH MISCELLANEOUS 2 TIMES DAILY
Qty: 60 EACH | Refills: 5 | Status: SHIPPED | OUTPATIENT
Start: 2019-09-16

## 2019-09-17 ENCOUNTER — TELEPHONE (OUTPATIENT)
Dept: ENDOCRINOLOGY | Age: 64
End: 2019-09-17

## 2019-09-30 ENCOUNTER — OFFICE VISIT (OUTPATIENT)
Dept: FAMILY MEDICINE CLINIC | Age: 64
End: 2019-09-30
Payer: COMMERCIAL

## 2019-09-30 VITALS
OXYGEN SATURATION: 93 % | SYSTOLIC BLOOD PRESSURE: 134 MMHG | DIASTOLIC BLOOD PRESSURE: 75 MMHG | HEART RATE: 76 BPM | TEMPERATURE: 97.6 F | BODY MASS INDEX: 41.81 KG/M2 | WEIGHT: 236 LBS

## 2019-09-30 DIAGNOSIS — M70.62 TROCHANTERIC BURSITIS OF LEFT HIP: ICD-10-CM

## 2019-09-30 DIAGNOSIS — M76.30 ILIOTIBIAL BAND SYNDROME, UNSPECIFIED LATERALITY: Primary | ICD-10-CM

## 2019-09-30 DIAGNOSIS — E03.9 ACQUIRED HYPOTHYROIDISM: Chronic | ICD-10-CM

## 2019-09-30 DIAGNOSIS — E66.01 CLASS 3 SEVERE OBESITY WITHOUT SERIOUS COMORBIDITY WITH BODY MASS INDEX (BMI) OF 40.0 TO 44.9 IN ADULT, UNSPECIFIED OBESITY TYPE (HCC): ICD-10-CM

## 2019-09-30 DIAGNOSIS — I10 ESSENTIAL HYPERTENSION: Chronic | ICD-10-CM

## 2019-09-30 DIAGNOSIS — K59.00 CONSTIPATION, UNSPECIFIED CONSTIPATION TYPE: ICD-10-CM

## 2019-09-30 DIAGNOSIS — E11.9 CONTROLLED TYPE 2 DIABETES MELLITUS WITHOUT COMPLICATION, WITHOUT LONG-TERM CURRENT USE OF INSULIN (HCC): ICD-10-CM

## 2019-09-30 DIAGNOSIS — E78.5 HYPERLIPIDEMIA, UNSPECIFIED HYPERLIPIDEMIA TYPE: ICD-10-CM

## 2019-09-30 PROCEDURE — 99214 OFFICE O/P EST MOD 30 MIN: CPT | Performed by: FAMILY MEDICINE

## 2019-09-30 NOTE — PROGRESS NOTES
of 40.0 to 44.9 in adult, unspecified obesity type ([facility] [address]  - Weight loss recommended. Discussed dietary/ lifestyle modifications, including cardiovascular exercise > 150 minutes/ week. - Ambulatory Referral To Diabetes Education  - Amb Referral to Nutrition Services    5. Acquired hypothyroidism  - stable. Continue Synthroid 0.112 mg qd. 6. Controlled type 2 diabetes mellitus without complication, without long-term current use of insulin [facility]  - followed by Endocrinologist.   - Ambulatory Referral To Diabetes Education  - Amb Referral to Nutrition Services    7. Essential hypertension  - controlled. 8. Hyperlipidemia, unspecified hyperlipidemia type  - continue Lipitor 10 mg qd and low cholesterol diet. Follow up if no improvement in 64 weeks/ as needed for increased symptoms.

## 2019-10-17 ENCOUNTER — HOSPITAL ENCOUNTER (OUTPATIENT)
Dept: PHYSICAL THERAPY | Age: 64
Setting detail: THERAPIES SERIES
Discharge: HOME | End: 2019-10-17
Payer: COMMERCIAL

## 2019-10-17 PROCEDURE — G0283 ELEC STIM OTHER THAN WOUND: HCPCS

## 2019-10-17 PROCEDURE — 97161 PT EVAL LOW COMPLEX 20 MIN: CPT

## 2019-10-17 PROCEDURE — 97530 THERAPEUTIC ACTIVITIES: CPT

## 2019-11-01 ENCOUNTER — HOSPITAL ENCOUNTER (OUTPATIENT)
Dept: PHYSICAL THERAPY | Age: 64
Setting detail: THERAPIES SERIES
Discharge: HOME | End: 2019-11-01
Payer: COMMERCIAL

## 2019-11-01 PROCEDURE — 97110 THERAPEUTIC EXERCISES: CPT

## 2019-11-05 ENCOUNTER — HOSPITAL ENCOUNTER (OUTPATIENT)
Dept: PHYSICAL THERAPY | Age: 64
Setting detail: THERAPIES SERIES
Discharge: HOME | End: 2019-11-05
Payer: COMMERCIAL

## 2019-11-07 ENCOUNTER — APPOINTMENT (OUTPATIENT)
Dept: PHYSICAL THERAPY | Age: 64
End: 2019-11-07
Payer: COMMERCIAL

## 2019-11-12 ENCOUNTER — HOSPITAL ENCOUNTER (OUTPATIENT)
Age: 64
Discharge: HOME | End: 2019-11-12
Payer: COMMERCIAL

## 2019-11-12 ENCOUNTER — HOSPITAL ENCOUNTER (OUTPATIENT)
Dept: GENERAL RADIOLOGY | Age: 64
Discharge: HOME | End: 2019-11-12
Payer: COMMERCIAL

## 2019-11-12 ENCOUNTER — OFFICE VISIT (OUTPATIENT)
Dept: FAMILY MEDICINE CLINIC | Age: 64
End: 2019-11-12
Payer: COMMERCIAL

## 2019-11-12 ENCOUNTER — APPOINTMENT (OUTPATIENT)
Dept: PHYSICAL THERAPY | Age: 64
End: 2019-11-12
Payer: COMMERCIAL

## 2019-11-12 VITALS
SYSTOLIC BLOOD PRESSURE: 117 MMHG | DIASTOLIC BLOOD PRESSURE: 51 MMHG | WEIGHT: 218 LBS | BODY MASS INDEX: 38.62 KG/M2 | RESPIRATION RATE: 12 BRPM | TEMPERATURE: 97.4 F | HEART RATE: 77 BPM | OXYGEN SATURATION: 96 %

## 2019-11-12 DIAGNOSIS — M53.3 SACRAL PAIN: ICD-10-CM

## 2019-11-12 DIAGNOSIS — M25.552 LEFT HIP PAIN: ICD-10-CM

## 2019-11-12 DIAGNOSIS — H65.91 OTHER NONSUPPURATIVE OTITIS MEDIA OF RIGHT EAR, UNSPECIFIED CHRONICITY: Primary | ICD-10-CM

## 2019-11-12 DIAGNOSIS — M70.62 TROCHANTERIC BURSITIS OF LEFT HIP: ICD-10-CM

## 2019-11-12 PROCEDURE — 99214 OFFICE O/P EST MOD 30 MIN: CPT | Performed by: FAMILY MEDICINE

## 2019-11-12 PROCEDURE — 73502 X-RAY EXAM HIP UNI 2-3 VIEWS: CPT

## 2019-11-12 PROCEDURE — 72202 X-RAY EXAM SI JOINTS 3/> VWS: CPT

## 2019-11-12 RX ORDER — CEFUROXIME AXETIL 500 MG/1
500 TABLET ORAL 2 TIMES DAILY
Qty: 20 TABLET | Refills: 0 | Status: SHIPPED | OUTPATIENT
Start: 2019-11-12 | End: 2019-11-22

## 2019-11-12 ASSESSMENT — PATIENT HEALTH QUESTIONNAIRE - PHQ9
SUM OF ALL RESPONSES TO PHQ9 QUESTIONS 1 & 2: 0
1. LITTLE INTEREST OR PLEASURE IN DOING THINGS: 0
2. FEELING DOWN, DEPRESSED OR HOPELESS: 0
SUM OF ALL RESPONSES TO PHQ QUESTIONS 1-9: 0
SUM OF ALL RESPONSES TO PHQ QUESTIONS 1-9: 0

## 2019-11-14 ENCOUNTER — APPOINTMENT (OUTPATIENT)
Dept: PHYSICAL THERAPY | Age: 64
End: 2019-11-14
Payer: COMMERCIAL

## 2019-11-19 ENCOUNTER — APPOINTMENT (OUTPATIENT)
Dept: PHYSICAL THERAPY | Age: 64
End: 2019-11-19
Payer: COMMERCIAL

## 2019-11-21 ENCOUNTER — APPOINTMENT (OUTPATIENT)
Dept: PHYSICAL THERAPY | Age: 64
End: 2019-11-21
Payer: COMMERCIAL

## 2019-11-26 DIAGNOSIS — E11.9 CONTROLLED TYPE 2 DIABETES MELLITUS WITHOUT COMPLICATION, WITHOUT LONG-TERM CURRENT USE OF INSULIN (HCC): ICD-10-CM

## 2019-11-26 DIAGNOSIS — R73.03 PREDIABETES: Chronic | ICD-10-CM

## 2019-11-26 DIAGNOSIS — E03.9 ACQUIRED HYPOTHYROIDISM: Chronic | ICD-10-CM

## 2019-11-26 DIAGNOSIS — E78.2 MIXED HYPERLIPIDEMIA: Chronic | ICD-10-CM

## 2019-11-26 LAB
A/G RATIO: 1 (ref 1.1–2.2)
ALBUMIN SERPL-MCNC: 4 G/DL (ref 3.4–5)
ALP BLD-CCNC: 88 U/L (ref 40–129)
ALT SERPL-CCNC: 43 U/L (ref 10–40)
ANION GAP SERPL CALCULATED.3IONS-SCNC: 12 MMOL/L (ref 3–16)
AST SERPL-CCNC: 34 U/L (ref 15–37)
BILIRUB SERPL-MCNC: 0.4 MG/DL (ref 0–1)
BUN BLDV-MCNC: 12 MG/DL (ref 7–20)
CALCIUM SERPL-MCNC: 10 MG/DL (ref 8.3–10.6)
CHLORIDE BLD-SCNC: 103 MMOL/L (ref 99–110)
CHOLESTEROL, TOTAL: 103 MG/DL (ref 0–199)
CO2: 24 MMOL/L (ref 21–32)
CREAT SERPL-MCNC: <0.5 MG/DL (ref 0.6–1.2)
CREATININE URINE: 75.4 MG/DL (ref 28–259)
ESTIMATED AVERAGE GLUCOSE: 119.8 MG/DL
GFR AFRICAN AMERICAN: >60
GFR NON-AFRICAN AMERICAN: >60
GLOBULIN: 4.1 G/DL
GLUCOSE BLD-MCNC: 100 MG/DL (ref 70–99)
HBA1C MFR BLD: 5.8 %
HDLC SERPL-MCNC: 43 MG/DL (ref 40–60)
LDL CHOLESTEROL CALCULATED: 45 MG/DL
MICROALBUMIN UR-MCNC: <1.2 MG/DL
MICROALBUMIN/CREAT UR-RTO: NORMAL MG/G (ref 0–30)
POTASSIUM SERPL-SCNC: 4.1 MMOL/L (ref 3.5–5.1)
SODIUM BLD-SCNC: 139 MMOL/L (ref 136–145)
T4 FREE: 1.7 NG/DL (ref 0.9–1.8)
TOTAL PROTEIN: 8.1 G/DL (ref 6.4–8.2)
TRIGL SERPL-MCNC: 75 MG/DL (ref 0–150)
TSH SERPL DL<=0.05 MIU/L-ACNC: 0.2 UIU/ML (ref 0.27–4.2)
VLDLC SERPL CALC-MCNC: 15 MG/DL

## 2019-11-30 LAB — C-PEPTIDE: 3.3 NG/ML (ref 1.1–4.4)

## 2019-12-03 ENCOUNTER — HOSPITAL ENCOUNTER (OUTPATIENT)
Dept: MAMMOGRAPHY | Age: 64
Discharge: HOME | End: 2019-12-03
Payer: COMMERCIAL

## 2019-12-03 DIAGNOSIS — Z12.31 VISIT FOR SCREENING MAMMOGRAM: ICD-10-CM

## 2019-12-03 PROCEDURE — 77063 BREAST TOMOSYNTHESIS BI: CPT

## 2019-12-04 ENCOUNTER — HOSPITAL ENCOUNTER (OUTPATIENT)
Dept: PHYSICAL THERAPY | Age: 64
Setting detail: THERAPIES SERIES
Discharge: HOME | End: 2019-12-04
Payer: COMMERCIAL

## 2019-12-09 ENCOUNTER — OFFICE VISIT (OUTPATIENT)
Dept: ENDOCRINOLOGY | Age: 64
End: 2019-12-09
Payer: COMMERCIAL

## 2019-12-09 VITALS
HEIGHT: 63 IN | WEIGHT: 211.4 LBS | BODY MASS INDEX: 37.46 KG/M2 | OXYGEN SATURATION: 95 % | DIASTOLIC BLOOD PRESSURE: 70 MMHG | SYSTOLIC BLOOD PRESSURE: 133 MMHG | HEART RATE: 73 BPM

## 2019-12-09 DIAGNOSIS — E11.9 CONTROLLED TYPE 2 DIABETES MELLITUS WITHOUT COMPLICATION, WITHOUT LONG-TERM CURRENT USE OF INSULIN (HCC): Primary | ICD-10-CM

## 2019-12-09 DIAGNOSIS — E78.2 MIXED HYPERLIPIDEMIA: Chronic | ICD-10-CM

## 2019-12-09 DIAGNOSIS — R79.89 ELEVATED LIVER FUNCTION TESTS: Chronic | ICD-10-CM

## 2019-12-09 DIAGNOSIS — E03.9 ACQUIRED HYPOTHYROIDISM: Chronic | ICD-10-CM

## 2019-12-09 DIAGNOSIS — I10 ESSENTIAL HYPERTENSION: Chronic | ICD-10-CM

## 2019-12-09 DIAGNOSIS — E04.1 THYROID NODULE: ICD-10-CM

## 2019-12-09 DIAGNOSIS — Z98.84 STATUS POST GASTRIC BANDING: Chronic | ICD-10-CM

## 2019-12-09 DIAGNOSIS — E66.01 CLASS 2 SEVERE OBESITY WITH SERIOUS COMORBIDITY AND BODY MASS INDEX (BMI) OF 37.0 TO 37.9 IN ADULT, UNSPECIFIED OBESITY TYPE (HCC): ICD-10-CM

## 2019-12-09 PROCEDURE — 99215 OFFICE O/P EST HI 40 MIN: CPT | Performed by: INTERNAL MEDICINE

## 2019-12-09 RX ORDER — ATORVASTATIN CALCIUM 10 MG/1
TABLET, FILM COATED ORAL
Qty: 90 TABLET | Refills: 1 | Status: SHIPPED | OUTPATIENT
Start: 2019-12-09 | End: 2020-01-20 | Stop reason: SDUPTHER

## 2019-12-09 RX ORDER — LEVOTHYROXINE SODIUM 0.1 MG/1
TABLET ORAL
Qty: 90 TABLET | Refills: 1 | Status: SHIPPED | OUTPATIENT
Start: 2019-12-09 | End: 2020-06-11

## 2019-12-30 ENCOUNTER — TELEPHONE (OUTPATIENT)
Dept: FAMILY MEDICINE CLINIC | Age: 64
End: 2019-12-30

## 2020-01-20 ENCOUNTER — OFFICE VISIT (OUTPATIENT)
Dept: FAMILY MEDICINE CLINIC | Age: 65
End: 2020-01-20
Payer: COMMERCIAL

## 2020-01-20 VITALS
WEIGHT: 210.6 LBS | SYSTOLIC BLOOD PRESSURE: 126 MMHG | BODY MASS INDEX: 37.31 KG/M2 | RESPIRATION RATE: 12 BRPM | OXYGEN SATURATION: 95 % | DIASTOLIC BLOOD PRESSURE: 78 MMHG | TEMPERATURE: 96.3 F | HEART RATE: 81 BPM

## 2020-01-20 PROCEDURE — 99214 OFFICE O/P EST MOD 30 MIN: CPT | Performed by: FAMILY MEDICINE

## 2020-01-20 RX ORDER — ATORVASTATIN CALCIUM 10 MG/1
TABLET, FILM COATED ORAL
Qty: 90 TABLET | Refills: 1 | Status: SHIPPED | OUTPATIENT
Start: 2020-01-20 | End: 2020-09-28

## 2020-01-20 RX ORDER — DICLOFENAC SODIUM 75 MG/1
75 TABLET, DELAYED RELEASE ORAL 2 TIMES DAILY
Qty: 60 TABLET | Refills: 1 | Status: SHIPPED | OUTPATIENT
Start: 2020-01-20 | End: 2020-05-13 | Stop reason: SDUPTHER

## 2020-01-20 NOTE — PROGRESS NOTES
to Physical Therapy  - [facility] Adis Scott    3. Iliotibial band syndrome, unspecified laterality  - Moist heat . ROM exercises. Modify activities. - diclofenac (VOLTAREN) 75 MG EC tablet; Take 1 tablet by mouth 2 times daily Prn left hip pain  Dispense: 60 tablet; Refill: 1  - Ambulatory referral to Physical Therapy  [facility] Adis Scott     Follow up if no improvement in 6 weeks/ as needed for increased symptoms.

## 2020-03-12 DIAGNOSIS — E66.01 CLASS 2 SEVERE OBESITY WITH SERIOUS COMORBIDITY AND BODY MASS INDEX (BMI) OF 37.0 TO 37.9 IN ADULT, UNSPECIFIED OBESITY TYPE (HCC): ICD-10-CM

## 2020-03-12 DIAGNOSIS — I10 ESSENTIAL HYPERTENSION: Chronic | ICD-10-CM

## 2020-03-12 DIAGNOSIS — R79.89 ELEVATED LIVER FUNCTION TESTS: Chronic | ICD-10-CM

## 2020-03-12 DIAGNOSIS — E03.9 ACQUIRED HYPOTHYROIDISM: Chronic | ICD-10-CM

## 2020-03-12 LAB
A/G RATIO: 0.9 (ref 1.1–2.2)
ALBUMIN SERPL-MCNC: 3.9 G/DL (ref 3.4–5)
ALP BLD-CCNC: 123 U/L (ref 40–129)
ALT SERPL-CCNC: 17 U/L (ref 10–40)
ANION GAP SERPL CALCULATED.3IONS-SCNC: 10 MMOL/L (ref 3–16)
AST SERPL-CCNC: 18 U/L (ref 15–37)
BILIRUB SERPL-MCNC: 0.3 MG/DL (ref 0–1)
BUN BLDV-MCNC: 18 MG/DL (ref 7–20)
CALCIUM SERPL-MCNC: 10.1 MG/DL (ref 8.3–10.6)
CHLORIDE BLD-SCNC: 105 MMOL/L (ref 99–110)
CHOLESTEROL, TOTAL: 156 MG/DL (ref 0–199)
CO2: 26 MMOL/L (ref 21–32)
CREAT SERPL-MCNC: <0.5 MG/DL (ref 0.6–1.2)
CREATININE URINE: 109.4 MG/DL (ref 28–259)
ESTIMATED AVERAGE GLUCOSE: 125.5 MG/DL
GFR AFRICAN AMERICAN: >60
GFR NON-AFRICAN AMERICAN: >60
GLOBULIN: 4.3 G/DL
GLUCOSE BLD-MCNC: 107 MG/DL (ref 70–99)
HBA1C MFR BLD: 6 %
HDLC SERPL-MCNC: 53 MG/DL (ref 40–60)
LDL CHOLESTEROL CALCULATED: 84 MG/DL
MICROALBUMIN UR-MCNC: 2 MG/DL
MICROALBUMIN/CREAT UR-RTO: 18.3 MG/G (ref 0–30)
POTASSIUM SERPL-SCNC: 4 MMOL/L (ref 3.5–5.1)
SODIUM BLD-SCNC: 141 MMOL/L (ref 136–145)
T4 FREE: 1.1 NG/DL (ref 0.9–1.8)
TOTAL PROTEIN: 8.2 G/DL (ref 6.4–8.2)
TRIGL SERPL-MCNC: 95 MG/DL (ref 0–150)
TSH SERPL DL<=0.05 MIU/L-ACNC: 1.43 UIU/ML (ref 0.27–4.2)
VLDLC SERPL CALC-MCNC: 19 MG/DL

## 2020-03-16 ENCOUNTER — TELEPHONE (OUTPATIENT)
Dept: ENDOCRINOLOGY | Age: 65
End: 2020-03-16

## 2020-03-19 RX ORDER — ALBUTEROL SULFATE 90 UG/1
2 AEROSOL, METERED RESPIRATORY (INHALATION) EVERY 6 HOURS PRN
Qty: 1 INHALER | Refills: 2 | Status: SHIPPED | OUTPATIENT
Start: 2020-03-19 | End: 2021-01-04 | Stop reason: SDUPTHER

## 2020-03-19 NOTE — TELEPHONE ENCOUNTER
Pt is requesting a refill for   Ablbuterol sulfate HFA (PROAIR HFA) 108 (90 BASE) MCG/ACT inhaler  Be called in.  Please advise

## 2020-04-24 ENCOUNTER — VIRTUAL VISIT (OUTPATIENT)
Dept: ENDOCRINOLOGY | Age: 65
End: 2020-04-24
Payer: COMMERCIAL

## 2020-04-24 PROCEDURE — 99213 OFFICE O/P EST LOW 20 MIN: CPT | Performed by: INTERNAL MEDICINE

## 2020-04-24 NOTE — PROGRESS NOTES
Tono Hopkins is a 60 y.o. female who is being evaluated for Type 2 diabetes mellitus. 2020    TELEHEALTH EVALUATION -- Audio/Visual (During [id number] public health emergency    Patient provided verbal consent to use the video visit. HPI:    Tono Hopkins (:  1955) has requested an audio/video evaluation for the following concern(s):      Current symptoms/problems include no symptoms and show no change. 1.  Controlled type 2 diabetes mellitus without complication, without long-term current use of insulin ([facility] [address] [E11.9]    Diagnosed with Type 2 diabetes mellitus in 2019. Comorbid conditions: hyperlipidemia    Current diabetic medications include: none    Intolerance to diabetes medications: No     Weight trend: stable  Prior visit with dietician: yes  Current diet: on average, 3 meals per day  Current exercise: walks     Current monitoring regimen: home blood tests - 1 times daily  Has brought blood glucose log/meter:  Yes  Home blood sugar records: fasting range:  and postprandial range:    Any episodes of hypoglycemia? No  Hypoglycemia frequency and time(s):    Does patient have Glucagon emergency kit? No  Does patient have rapid acting carbohydrate? No  Does patient wear a medic alert bracelet or necklace? No      2. Acquired hypothyroidism  This started in 2017. Patient was diagnosed with hypothyroidism. The problem has been gradually worsening. Patient started medication in 2017. Currently patient is on: levothyroxine. Misses  0 doses a month.     Current complaints: constipation. 3. Thyroid nodule  Right 7 mm nodule. No difficulty swallowing  History of obstructive symptoms: difficulty swallowing Yes, changes in voice/hoarseness Yes. 4. Status post gastric banding  Had lap banding, lost 100 lbs, then started gaining again. Now is watching her diet, lost weight. 5. Obesity  Fluctuating weight. Eats healthy. Has neuroma, asthma. Difficult to exercise.   Preethi Nolen for the [facility]. 6. Elevated liver function tests  No nausea, vomiting.         Past Medical History:   Diagnosis Date    Asthma     Elevated liver function tests 10/21/2016    Essential hypertension 12/12/2016    GERD (gastroesophageal reflux disease)     History of cystocele 9/12/2016    Hyperglycemia 10/21/2016    Hyperlipidemia     Hypertension     Hypothyroidism 10/21/2016    Migraine     Morbid obesity due to excess calories ([address] 10/21/2016    Reina's neuroma of left foot 10/21/2016    Non morbid obesity due to excess calories 9/12/2016    Obesity     Obstructive sleep apnea 10/21/2016    Osteoarthritis     Prediabetes 10/21/2016    Rectocele 9/12/2016    Seasonal allergic rhinitis 12/12/2016    Snoring 10/21/2016    Status post gastric banding 4/26/2010    Uterovaginal prolapse 9/12/2016      Patient Active Problem List   Diagnosis    Arthritis    Asthma    Hyperlipidemia    Status post gastric banding    Non morbid obesity due to excess calories    History of cystocele    Rectocele    Uterovaginal prolapse    Hypothyroidism    Hyperglycemia    Diabetes mellitus type 2, controlled, without complications ([address]    Elevated liver function tests    Reina's neuroma of left foot    Snoring    Obstructive sleep apnea    Morbid obesity due to excess calories (HCC)    Seasonal allergic rhinitis    Essential hypertension    Pain in right upper arm    Numbness and tingling of both upper extremities while sleeping    Arthritis of right acromioclavicular joint    Pain radiating to neck    Biceps tendinitis on right    Bursitis of right shoulder    Tendinitis of right rotator cuff    Chronic right shoulder pain    Class 2 obesity with body mass index (BMI) of 37.0 to 37.9 in adult    Thyroid nodule     Past Surgical History:   Procedure Laterality Date    ABDOMINOPLASTY  Vanesa Wilkerson, [address] [place] [address]  09/21/2012    Dr. Em Cantu Glucose Monitoring Suppl (ONETOUCH VERIO FLEX SYSTEM) w/Device KIT 1 Device by Does not apply route once for 1 dose 1 kit 0    ONETOUCH DELICA LANCETS 03N MISC 1 each by Does not apply route 2 times daily 60 each 5    naproxen (NAPROSYN) 500 MG tablet Take 1 tablet by mouth 2 times daily (with meals) 180 tablet 0    Elastic Bandages & Supports (WRIST SPLINT/COCK-UP/LEFT L) MISC DX: CTS left. 1 each 0    meloxicam (MOBIC) 15 MG tablet Take 1 tablet by mouth daily Take one tab 15 mg by mouth daily with food for left hip pain. 30 tablet 2    Compression Stockings MISC by Does not apply route Compression 30 mm Hg. 1 each 0    fexofenadine (ALLEGRA) 180 MG tablet Take 1 tablet by mouth daily as needed (for allergies)       No current facility-administered medications for this visit. Allergies   Allergen Reactions    Latex Rash    Fruit & Vegetable Daily [Nutritional Supplements] Other (See Comments)     Vomiting, onion, tomato, watermelon, peach    Kiwi Extract      LIPS ITCH    Other Itching     Itchy lips and mouth with fruits-watermelon, peaches,meloln, avocado    Penicillins      Other reaction(s):  Other (See Comments)  unsure     Family Status   Relation Name Status    Mother      Father     Rita Hunt Other  (Not Specified)   [address]    Sister      Brother  Alive    Sister  Alive    Sister  Alive    Brother      Brother  Alive    Brother  Alive    Brother  Alive    Brother  Alive    Brother  Alive    Brother  Alive    Son [facility] [employer]       Lab Review:    Lab Results   Component Value Date    WBC 6.4 06/15/2017    HGB 12.4 06/15/2017    HCT 38.4 06/15/2017    MCV 91.7 06/15/2017     06/15/2017     Lab Results   Component Value Date     2020    K 4.0 2020     2020    CO2 26 2020    BUN 18 2020    CREATININE <0.5 2020 GLUCOSE 107 03/12/2020    CALCIUM 10.1 03/12/2020    PROT 8.2 03/12/2020    PROT 8.2 02/17/2010    LABALBU 3.9 03/12/2020    BILITOT 0.3 03/12/2020    ALKPHOS 123 03/12/2020    AST 18 03/12/2020    ALT 17 03/12/2020    LABGLOM >60 03/12/2020    GFRAA >60 03/12/2020    GFRAA >60 02/17/2010    AGRATIO 0.9 03/12/2020    GLOB 4.3 03/12/2020     Lab Results   Component Value Date    TSH 1.43 03/12/2020    FT3 3.3 01/22/2018     Lab Results   Component Value Date    LABA1C 6.0 03/12/2020     Lab Results   Component Value Date    .5 03/12/2020     Lab Results   Component Value Date    CHOL 156 03/12/2020     Lab Results   Component Value Date    TRIG 95 03/12/2020     Lab Results   Component Value Date    HDL 53 03/12/2020     Lab Results   Component Value Date    LDLCALC 84 03/12/2020     Lab Results   Component Value Date    LABVLDL 19 03/12/2020    VLDL 22 12/02/2017     No results found for: CHOLHDLRATIO  Lab Results   Component Value Date    LABMICR 2.00 03/12/2020     Lab Results   Component Value Date    VITD25 32 02/17/2010      Review of Systems:  Constitutional: no fatigue, no fever, no recent weight gain, has intentional recent weight loss, no changes in appetite  Eyes: no eye pain, no change in vision, no eye redness, no eye irritation, no double vision  Ears, nose, throat: no nasal congestion, no sore throat, no earache, no decrease in hearing, has hoarseness, no dry mouth, no sinus problems, no difficulty swallowing, no neck lumps, no dental problems, no mouth sores, no ringing in ears  Pulmonary: has shortness of breath, no wheezing, no dyspnea on exertion, has cough  Cardiovascular: has chest pain, has lower extremity edema, no orthopnea, no intermittent leg claudication, has palpitations  Gastrointestinal: no abdominal pain, no nausea, no vomiting, no diarrhea, no constipation, no heartburn, no bloating  Genitourinary: no dysuria, no urinary incontinence, no urinary hesitancy, no change in urinary

## 2020-05-13 RX ORDER — DICLOFENAC SODIUM 75 MG/1
75 TABLET, DELAYED RELEASE ORAL 2 TIMES DAILY
Qty: 60 TABLET | Refills: 1 | Status: SHIPPED | OUTPATIENT
Start: 2020-05-13 | End: 2020-09-30

## 2020-06-11 RX ORDER — LEVOTHYROXINE SODIUM 0.1 MG/1
TABLET ORAL
Qty: 90 TABLET | Refills: 0 | Status: SHIPPED | OUTPATIENT
Start: 2020-06-11 | End: 2020-09-28

## 2020-07-20 ENCOUNTER — OFFICE VISIT (OUTPATIENT)
Dept: FAMILY MEDICINE CLINIC | Age: 65
End: 2020-07-20
Payer: COMMERCIAL

## 2020-07-20 VITALS
DIASTOLIC BLOOD PRESSURE: 75 MMHG | BODY MASS INDEX: 39.47 KG/M2 | SYSTOLIC BLOOD PRESSURE: 141 MMHG | HEART RATE: 75 BPM | WEIGHT: 222.8 LBS | TEMPERATURE: 98.2 F | OXYGEN SATURATION: 99 %

## 2020-07-20 PROCEDURE — 99214 OFFICE O/P EST MOD 30 MIN: CPT | Performed by: FAMILY MEDICINE

## 2020-07-20 ASSESSMENT — PATIENT HEALTH QUESTIONNAIRE - PHQ9
SUM OF ALL RESPONSES TO PHQ QUESTIONS 1-9: 0
SUM OF ALL RESPONSES TO PHQ9 QUESTIONS 1 & 2: 0
2. FEELING DOWN, DEPRESSED OR HOPELESS: 0
SUM OF ALL RESPONSES TO PHQ QUESTIONS 1-9: 0
1. LITTLE INTEREST OR PLEASURE IN DOING THINGS: 0

## 2020-07-20 NOTE — PROGRESS NOTES
P  SUBJECTIVE:  60 y.o. Rita Yuenling female  for follow up of diabetes, hypertension, and hypercholesterolemia. Diabetic Review of Systems - medication compliance: compliant most of the time, diabetic diet compliance: noncompliant some of the time, home glucose monitoring: fasting values range 80-90s in the am ;  110-119 after lunch, further diabetic ROS: no polyuria or polydipsia, no chest pain, dyspnea or TIA's, no numbness, tingling or pain in extremities, last eye exam approximately - 2018? Rita Dilidalmis Some constipation. She had abdominoplasty in 1996 and lap band in 2010. She takes Citrucel and Miralax 1-2 times per day and  gummie fibers. Her bowel movements are q2-3 days if she does not take anything. She uses chinese tea. No black stools or rectal bleeding. No fever or abdominal pain. She had left foot surgery in 1/2020. She has some pain to left foot upon awakening to lateral foot, but not to heal or to area where she had surgery. Current Outpatient Medications   Medication Sig Dispense Refill    levothyroxine (SYNTHROID) 100 MCG tablet TAKE 1 TABLET BY MOUTH ONE TIME A DAY  90 tablet 0    diclofenac (VOLTAREN) 75 MG EC tablet Take 1 tablet by mouth 2 times daily Prn left hip pain 60 tablet 1    albuterol sulfate HFA (PROAIR HFA) 108 (90 Base) MCG/ACT inhaler Inhale 2 puffs into the lungs every 6 hours as needed for Wheezing or Shortness of Breath 1 Inhaler 2    atorvastatin (LIPITOR) 10 MG tablet TAKE 1 TABLET BY MOUTH ONE TIME A DAY 90 tablet 1    ONETOUCH VERIO strip 1 each by In Vitro route 2 times daily 100 each 3    ONETOUCH DELICA LANCETS 36W MISC 1 each by Does not apply route 2 times daily 60 each 5    naproxen (NAPROSYN) 500 MG tablet Take 1 tablet by mouth 2 times daily (with meals) 180 tablet 0    Elastic Bandages & Supports (WRIST SPLINT/COCK-UP/LEFT L) MISC DX: CTS left.  1 each 0    Compression Stockings MISC by Does not apply route Compression 30 mm Hg. 1 each 0    fexofenadine (ALLEGRA) 180 MG tablet Take 1 tablet by mouth daily as needed (for allergies)      Blood Glucose Monitoring Suppl (ONETOUCH VERIO FLEX SYSTEM) w/Device KIT 1 Device by Does not apply route once for 1 dose 1 kit 0    meloxicam (MOBIC) 15 MG tablet Take 1 tablet by mouth daily Take one tab 15 mg by mouth daily with food for left hip pain. 30 tablet 2     No current facility-administered medications for this visit. ROS: All other systems were reviewed and were negative . Patient's allergies and medications were reviewed. Patient's past medical, surgical, social , and family history were reviewed. OBJECTIVE:  BP (!) 141/75   Pulse 75   Temp 98.2 °F (36.8 °C)   Wt 222 lb 12.8 oz (101.1 kg)   SpO2 99%   BMI 39.47 kg/m²   General: NAD, cooperative, alert and oriented X 3, affect / mood is good. Good insight. well hydrated. Neck : no lymphadenopathy, supple, FROM  CV: Regular rate and rhythm , no murmurs/ rub/ gallop. No edema. Lungs : CTA bilaterally, breathing comfortably  Abdomen: positive bowel sounds, soft , non tender, non distended. No hepatosplenomegaly. Feet: normal sensation to monofilament bilaterally, no ulcers. DP/ PT pulses normal.   Skin: no rashes. Non tender. ASSESSMENT:  1. Controlled type 2 diabetes mellitus without complication, without long-term current use of insulin (HCC)  - Controlled. Continue dietary/ lifestyle modifications, including decreased concentrated sweets and carbohydrates. -  DIABETES FOOT EXAM    2. Constipation, unspecified constipation type  - Trial of LinaCLOtide (LINZESS) 72 MCG CAPS capsule; Take 1 capsule by mouth every morning (before breakfast)  Dispense: 30 capsule; Refill: 0  - Push fluids - water and daily dietary fiber.   - decrease Miralax to 1/2 dose qd and monitor.   - continue daily fiber supplement 1x/ day. 3. Essential hypertension  - controlled. Continue low sodium diet.       4. Seasonal allergic rhinitis, unspecified trigger  - stable. 5. Mild asthma without complication, unspecified whether persistent  - stable . Albuterol HFA prn.     6. Acquired hypothyroidism  - stable . Continue Synthroid 0.100 mg qd.    7. Hyperlipidemia, unspecified hyperlipidemia type  - stable. Continue Lipitor 10 mg qd and low cholesterol diet. PLAN:  See orders for this visit as documented in the electronic medical record. Issues reviewed with her: low cholesterol diet, weight control and daily exercise discussed, home glucose monitoring emphasized, all medications, side effects and compliance discussed carefully, foot care discussed and Podiatry visits discussed, annual eye examinations at Ophthalmology discussed, glycohemoglobin and other lab monitoring discussed and long term diabetic complications discussed. Goals:   1) Blood pressure < 130/80  2) HGA1C ideal less than 6.5, at least less than 7.0  3) LDL cholesterol < 100  4) Exercise 150 minutes a week   5) Dilated eye exam by an optometrist or ophthalmologist once a year  6) Fasting blood sugar < 110  7) Glucose 2 hours after meal < 140  8) Aspirin 81 mg once a day  9) BMI (Body Mass Index) ideal < 25, at least less than 30. Weight loss of even 10 to 15 pounds is effective in improving diabetes  9) Total fat intake to less than 60 grams per day and saturated fat to less than 20 grams per day. 10) Diabetic education and diabetic nutrition classes. [phone]) Doctor visits every 3 months. Follow up 4 weeks/ prn.

## 2020-08-18 RX ORDER — LINACLOTIDE 72 UG/1
CAPSULE, GELATIN COATED ORAL
Qty: 30 CAPSULE | Refills: 0 | Status: SHIPPED | OUTPATIENT
Start: 2020-08-18 | End: 2021-05-12

## 2020-09-28 ENCOUNTER — VIRTUAL VISIT (OUTPATIENT)
Dept: ENDOCRINOLOGY | Age: 65
End: 2020-09-28
Payer: COMMERCIAL

## 2020-09-28 PROCEDURE — 99214 OFFICE O/P EST MOD 30 MIN: CPT | Performed by: INTERNAL MEDICINE

## 2020-09-28 RX ORDER — ATORVASTATIN CALCIUM 10 MG/1
TABLET, FILM COATED ORAL
Qty: 90 TABLET | Refills: 1 | Status: SHIPPED | OUTPATIENT
Start: 2020-09-28 | End: 2020-10-12

## 2020-09-28 RX ORDER — LEVOTHYROXINE SODIUM 0.1 MG/1
TABLET ORAL
Qty: 90 TABLET | Refills: 1 | Status: SHIPPED | OUTPATIENT
Start: 2020-09-28 | End: 2021-03-22 | Stop reason: SDUPTHER

## 2020-09-28 NOTE — PROGRESS NOTES
Eusebio Estevez is a 60 y.o. female who presents for Type 2 diabetes mellitus. 2020    TELEHEALTH EVALUATION -- Audio/Visual (During [facility]- public health emergency)    Patient provided verbal consent to use the video visit. HPI:    Eusebio Estevez (:  1955) has requested an audio/video evaluation for the following concern(s):      Current symptoms/problems include no symptoms and show no change. 1.  Controlled type 2 diabetes mellitus without complication, without long-term current use of insulin ([facility] [address] [E11.9]    Eusebio Estevez (:  1955) has requested an audio/video evaluation for the following concern(s):     Diagnosed with Type 2 diabetes mellitus in 2019.     Comorbid conditions: hyperlipidemia     Current diabetic medications include: none     Intolerance to diabetes medications: No     Weight trend: stable  Prior visit with dietician: yes  Current diet: on average, 3 meals per day  Current exercise: walks     Current monitoring regimen: home blood tests - 1 times daily  Has brought blood glucose log/meter:  Yes  Home blood sugar records: fasting range:  and postprandial range:    Any episodes of hypoglycemia? No  Hypoglycemia frequency and time(s):    Does patient have Glucagon emergency kit? No  Does patient have rapid acting carbohydrate?  No  Does patient wear a medic alert bracelet or necklace?  No      2. Acquired hypothyroidism  This started in 2017. Patient was diagnosed with hypothyroidism. The problem has been gradually worsening.    Patient started medication in 2017. Currently patient is on: levothyroxine. Misses  0 doses a month.     Current complaints: constipation.     3. Thyroid nodule  Right 7 mm nodule. No difficulty swallowing  History of obstructive symptoms: difficulty swallowing Yes, changes in voice/hoarseness Yes.     4. Status post gastric banding  Had lap banding, lost 100 lbs, then started gaining again.   Now is watching her diet, lost [address] SUSPENSION  09/21/2012    Dr. Brennan Villalba, [facility]      CHOLECYSTECTOMY      COLONOSCOPY  12/04/2013    Dr. Vicky Rankin.  [place]    CYSTOSCOPY  09/21/2012    Dr. Taz Franklin Left 10/25/2016    Dr. Barbara Esqueda Left 01/2020    HYSTERECTOMY, VAGINAL  09/21/2012    Dr. Ana Kramer    LAP BAND  01/15/2010    Dr. Anna Marie Flowers Left 07/26/2010    Dr. Herrera Sports - incisional biopsy of left tonsillar mass   [address] [place] [address] UPPER GASTROINTESTINAL ENDOSCOPY  01/23/2015    Dr. Gisele Andrea  10/16/2010    Dr. Corrina Conde    work related injury, removed a bone     Social History     Socioeconomic History    Marital status:      Spouse name: Usman Frausto Number of children: 10    Years of education: Not on file    Highest education level: Not on file   Occupational History    Occupation:  and cleans houses     Comment: as of September 12, 2016   Social Needs    Financial resource strain: Not on file    Food insecurity     Worry: Not on file     Inability: Not on file   [employer] needs     Medical: Not on file     Non-medical: Not on file   Tobacco Use    Smoking status: Never Smoker    Smokeless tobacco: Never Used   Substance and Sexual Activity    Alcohol use: No    Drug use: No    Sexual activity: Yes     Partners: Male     Comment:  - [facility] Physical activity     Days per week: Not on file     Minutes per session: Not on file    Stress: Not on file   Relationships    Social connections     Talks on phone: Not on file     Gets together: Not on file Attends [employer] service: Not on file     Active member of club or organization: Not on file     Attends meetings of clubs or organizations: Not on file     Relationship status: Not on file    Intimate partner violence     Fear of current or ex partner: Not on file     Emotionally abused: Not on file     Physically abused: Not on file     Forced sexual activity: Not on file   Other Topics Concern    Not on file   Social History Narrative    Not on file     Family History   Problem Relation Age of Onset    Cancer Mother     Diabetes Father     High Blood Pressure Father     High Cholesterol Father     Heart Disease Father     Asthma Other     Uterine Cancer Other     Heart Disease Other     Depression Other     Diabetes Other     High Cholesterol Other     High Blood Pressure Other     Kidney Disease Other     Migraines Other     Stroke Other     Arthritis Other     No Known Problems Daughter     No Known Problems Daughter     No Known Problems Daughter     No Known Problems Daughter     No Known Problems Daughter     Other Sister         Brain tumor Surgery-didn't wake up    Diabetes Brother     No Known Problems Sister     No Known Problems Sister     Cancer Brother         cancer    Cancer Brother         bone    No Known Problems Brother     No Known Problems Brother     No Known Problems Brother     No Known Problems Brother     No Known Problems Brother     No Known Problems Son      Current Outpatient Medications   Medication Sig Dispense Refill    levothyroxine (SYNTHROID) 100 MCG tablet TAKE 1 TABLET BY MOUTH ONE TIME A DAY 90 tablet 1    atorvastatin (LIPITOR) 10 MG tablet TAKE 1 TABLET BY MOUTH ONE TIME A DAY 90 tablet 1    LINZESS 72 MCG CAPS capsule TAKE 1 CAPSULE BY MOUTH EVERY MORNING BEFORE BREAKFAST 30 capsule 0    albuterol sulfate HFA (PROAIR HFA) 108 (90 Base) MCG/ACT inhaler Inhale 2 puffs into the lungs every 6 hours as needed for Wheezing or Shortness of Breath 1 Inhaler 2    ONETOUCH VERIO strip 1 each by In Vitro route 2 times daily 100 each 3    ONETOUCH DELICA LANCETS 68S MISC 1 each by Does not apply route 2 times daily 60 each 5    Elastic Bandages & Supports (WRIST SPLINT/COCK-UP/LEFT L) MISC DX: CTS left. 1 each 0    Compression Stockings MISC by Does not apply route Compression 30 mm Hg. 1 each 0    fexofenadine (ALLEGRA) 180 MG tablet Take 1 tablet by mouth daily as needed (for allergies)      diclofenac (VOLTAREN) 75 MG EC tablet Take 1 tablet by mouth 2 times daily Prn left hip pain (Patient not taking: Reported on 2020) 60 tablet 1    Blood Glucose Monitoring Suppl (ONETOUCH VERIO FLEX SYSTEM) w/Device KIT 1 Device by Does not apply route once for 1 dose 1 kit 0    naproxen (NAPROSYN) 500 MG tablet Take 1 tablet by mouth 2 times daily (with meals) (Patient not taking: Reported on 2020) 180 tablet 0    meloxicam (MOBIC) 15 MG tablet Take 1 tablet by mouth daily Take one tab 15 mg by mouth daily with food for left hip pain. 30 tablet 2     No current facility-administered medications for this visit. Allergies   Allergen Reactions    Latex Rash    Fruit & Vegetable Daily [Nutritional Supplements] Other (See Comments)     Vomiting, onion, tomato, watermelon, peach    Kiwi Extract      LIPS ITCH    Other Itching     Itchy lips and mouth with fruits-watermelon, peaches,meloln, avocado    Penicillins      Other reaction(s):  Other (See Comments)  unsure     Family Status   Relation Name Status    Mother      Father     Krunal Swartz Other  (Not Specified)   [address] [facility] Alive    Montse Carlota Alive    Sister      Brother  Alive    Sister  Alive    Sister  Alive    Brother      Brother  Alive    Brother  Alive    Brother  Alive    Brother  Alive    Brother  Alive    Brother  Alive    Son el? Review:    [employer] Value Date    WBC 6.4 06/15/2017    HGB 12.4 06/15/2017    HCT 38.4 06/15/2017    MCV 91.7 06/15/2017     06/15/2017     Lab Results   Component Value Date     03/12/2020    K 4.0 03/12/2020     03/12/2020    CO2 26 03/12/2020    BUN 18 03/12/2020    CREATININE <0.5 03/12/2020    GLUCOSE 107 03/12/2020    CALCIUM 10.1 03/12/2020    PROT 8.2 03/12/2020    PROT 8.2 02/17/2010    LABALBU 3.9 03/12/2020    BILITOT 0.3 03/12/2020    ALKPHOS 123 03/12/2020    AST 18 03/12/2020    ALT 17 03/12/2020    LABGLOM >60 03/12/2020    GFRAA >60 03/12/2020    GFRAA >60 02/17/2010    AGRATIO 0.9 03/12/2020    GLOB 4.3 03/12/2020     Lab Results   Component Value Date    TSH 1.43 03/12/2020    FT3 3.3 01/22/2018     Lab Results   Component Value Date    LABA1C 6.0 03/12/2020     Lab Results   Component Value Date    .5 03/12/2020     Lab Results   Component Value Date    CHOL 156 03/12/2020     Lab Results   Component Value Date    TRIG 95 03/12/2020     Lab Results   Component Value Date    HDL 53 03/12/2020     Lab Results   Component Value Date    LDLCALC 84 03/12/2020     Lab Results   Component Value Date    LABVLDL 19 03/12/2020    VLDL 22 12/02/2017     No results found for: CHOLHDLRATIO  Lab Results   Component Value Date    LABMICR 2.00 03/12/2020     Lab Results   Component Value Date    VITD25 32 02/17/2010      Review of Systems:  Constitutional: no fatigue, no fever, no recent weight gain, has intentional recent weight loss, no changes in appetite  Eyes: no eye pain, no change in vision, no eye redness, no eye irritation, no double vision  Ears, nose, throat: no nasal congestion, no sore throat, no earache, no decrease in hearing, has hoarseness, no dry mouth, no sinus problems, no difficulty swallowing, no neck lumps, no dental problems, no mouth sores, no ringing in ears  Pulmonary: has shortness of breath, no wheezing, no dyspnea on exertion, has cough  Cardiovascular: has chest pain, has lower extremity edema, no orthopnea, no intermittent leg claudication, has palpitations  Gastrointestinal: no abdominal pain, no nausea, no vomiting, no diarrhea, no constipation, no heartburn, no bloating  Genitourinary: no dysuria, no urinary incontinence, no urinary hesitancy, no change in urinary frequency, no feelings of urinary urgency, no nocturia  Musculoskeletal: no joint swelling, no joint stiffness, has joint pain, no muscle cramps, no muscle pain, no bone pain  Integument/Breast: no hair loss, no skin rashes, no skin lesions, no itching, no dry skin  Neurological: has numbness, no tingling, no weakness, no confusion, no headaches, no dizziness, no fainting, no tremors, no decrease in memory, no balance problems  Psychiatric: no anxiety, no depression, no insomnia  Hematologic/Lymphatic: no tendency for easy bleeding, no swollen lymph nodes, no tendency for easy bruising  Immunology: no seasonal allergies, no frequent infections, no frequent illnesses  Endocrine: no temperature intolerance, has hot flashes, no hand tremor       There were no vitals taken for this visit. Wt Readings from Last 3 Encounters:   07/20/20 222 lb 12.8 oz (101.1 kg)   01/20/20 210 lb 9.6 oz (95.5 kg)   12/09/19 211 lb 6.4 oz (95.9 kg)     There is no height or weight on file to calculate BMI.     OBJECTIVE:  Constitutional: no apparent distress, well developed and well nourished  Mental status: alert and awake, oriented to person, place and time, able to follow commands  Psychiatric: judgement and insight and normal, recent and remote memory are intact, mood and affect are normal  Skin: skin inspection appears normal, no significant exanthematous lesions or discoloration noted on facial skin  Head and Face: head and face inspection revealed no abnormalities, normocephalic, atraumatic  Eyes: no lid or conjunctival swelling, erythema or discharge, sclera appears normal  Ears/Nose: external inspection of ears and nose revealed no abnormalities, hearing is grossly normal  Oropharynx/Mouth/Face: lips are normal with no lesions, the voice quality was normal  Neck: neck is symmetric, no visualized mass  Pulmonary/chest: respiratory effort normal, no generalized signs of difficulty breathing or signs of respiratory distress  Musculoskeletal: normal station, normal range of motion of neck  Neurological: no facial asymmetry, normal general cortical function    ASSESSMENT/PLAN:  1. Diabetes Mellitus Type 2, controlled, without complications  Call for results  Stopped Metformin  Changed the diet  Lost 25 lbs. HbA1C 5.9-6.3-6.9-5.8-6.0  Glucose -107  - Hemoglobin A1C; Future     2. Obesity  Diet, exercise.     3. Acquired hypothyroidism  Call for results  TSH 2.65-1.14-0.2-1.43  Continue levothyroxine 0.100 mg daily  - TSH without Reflex; Future  - T4, Free; Future  - T3, Free; Future     4. Status post gastric banding  Diet, exercise.     5. Thyroid nodule  Right lobe, follow with US  Right 7 mm nodule. - US Head Neck Soft Tissue Thyroid; Future     6. Hyperlipidemia  LDL 73-45-84  No muscle pain. Atorvastatin 10 mg daily     7.  Elevated liver function tests  Improved  Follow  ALT 17  AST 18    Reviewed and/or ordered clinical lab results Yes  Reviewed and/or ordered radiology tests Yes   Reviewed and/or ordered other diagnostic tests No  Discussed test results with performing physician No  Independently reviewed image, tracing, or specimen No  Made a decision to obtain old records No  Reviewed and summarized old records Yes  Thyroid disease, on levothyroxine.   Obtained history from other than patient No     Boris Lam was counseled regarding symptoms of thyroid, diabetes diagnosis, course and complications of disease if inadequately treated, side effects of medications, diagnosis, treatment options, and prognosis, risks, benefits, complications, and alternatives of treatment, labs, imaging and other studies and treatment targets and Persons participating in the telehealth service: provider - Issa Dye MD and patient Jim Ross. Provider was located at her office. Patient was located at home. --Issa Dye MD on 9/28/2020 at 3:52 PM    An electronic signature was used to authenticate this note.       Electronically signed by Issa Dye MD on 9/28/2020 at 3:52 PM

## 2020-09-29 DIAGNOSIS — E11.9 CONTROLLED TYPE 2 DIABETES MELLITUS WITHOUT COMPLICATION, WITHOUT LONG-TERM CURRENT USE OF INSULIN (HCC): ICD-10-CM

## 2020-09-29 DIAGNOSIS — E03.9 ACQUIRED HYPOTHYROIDISM: Chronic | ICD-10-CM

## 2020-09-29 LAB
A/G RATIO: 0.9 (ref 1.1–2.2)
ALBUMIN SERPL-MCNC: 3.9 G/DL (ref 3.4–5)
ALP BLD-CCNC: 120 U/L (ref 40–129)
ALT SERPL-CCNC: 20 U/L (ref 10–40)
ANION GAP SERPL CALCULATED.3IONS-SCNC: 9 MMOL/L (ref 3–16)
AST SERPL-CCNC: 19 U/L (ref 15–37)
BILIRUB SERPL-MCNC: 0.3 MG/DL (ref 0–1)
BUN BLDV-MCNC: 11 MG/DL (ref 7–20)
CALCIUM SERPL-MCNC: 10 MG/DL (ref 8.3–10.6)
CHLORIDE BLD-SCNC: 105 MMOL/L (ref 99–110)
CHOLESTEROL, TOTAL: 205 MG/DL (ref 0–199)
CO2: 24 MMOL/L (ref 21–32)
CREAT SERPL-MCNC: <0.5 MG/DL (ref 0.6–1.2)
CREATININE URINE: 82.4 MG/DL (ref 28–259)
GFR AFRICAN AMERICAN: >60
GFR NON-AFRICAN AMERICAN: >60
GLOBULIN: 4.4 G/DL
GLUCOSE BLD-MCNC: 126 MG/DL (ref 70–99)
HDLC SERPL-MCNC: 47 MG/DL (ref 40–60)
LDL CHOLESTEROL CALCULATED: 134 MG/DL
MICROALBUMIN UR-MCNC: 1.7 MG/DL
MICROALBUMIN/CREAT UR-RTO: 20.6 MG/G (ref 0–30)
POTASSIUM SERPL-SCNC: 4.2 MMOL/L (ref 3.5–5.1)
SODIUM BLD-SCNC: 138 MMOL/L (ref 136–145)
T4 FREE: 1 NG/DL (ref 0.9–1.8)
TOTAL PROTEIN: 8.3 G/DL (ref 6.4–8.2)
TRIGL SERPL-MCNC: 120 MG/DL (ref 0–150)
TSH SERPL DL<=0.05 MIU/L-ACNC: 3.38 UIU/ML (ref 0.27–4.2)
VLDLC SERPL CALC-MCNC: 24 MG/DL

## 2020-09-30 LAB
ESTIMATED AVERAGE GLUCOSE: 131.2 MG/DL
HBA1C MFR BLD: 6.2 %

## 2020-09-30 RX ORDER — DICLOFENAC SODIUM 75 MG/1
TABLET, DELAYED RELEASE ORAL
Qty: 60 TABLET | Refills: 1 | Status: SHIPPED | OUTPATIENT
Start: 2020-09-30 | End: 2021-05-12

## 2020-10-12 ENCOUNTER — TELEPHONE (OUTPATIENT)
Dept: ENDOCRINOLOGY | Age: 65
End: 2020-10-12

## 2020-10-12 ENCOUNTER — OFFICE VISIT (OUTPATIENT)
Dept: FAMILY MEDICINE CLINIC | Age: 65
End: 2020-10-12
Payer: COMMERCIAL

## 2020-10-12 VITALS
OXYGEN SATURATION: 98 % | WEIGHT: 230 LBS | SYSTOLIC BLOOD PRESSURE: 138 MMHG | DIASTOLIC BLOOD PRESSURE: 72 MMHG | HEART RATE: 74 BPM | RESPIRATION RATE: 16 BRPM | TEMPERATURE: 97.2 F | BODY MASS INDEX: 40.74 KG/M2

## 2020-10-12 PROCEDURE — 90750 HZV VACC RECOMBINANT IM: CPT | Performed by: FAMILY MEDICINE

## 2020-10-12 PROCEDURE — 90472 IMMUNIZATION ADMIN EACH ADD: CPT | Performed by: FAMILY MEDICINE

## 2020-10-12 PROCEDURE — 90471 IMMUNIZATION ADMIN: CPT | Performed by: FAMILY MEDICINE

## 2020-10-12 PROCEDURE — 99214 OFFICE O/P EST MOD 30 MIN: CPT | Performed by: FAMILY MEDICINE

## 2020-10-12 PROCEDURE — 90732 PPSV23 VACC 2 YRS+ SUBQ/IM: CPT | Performed by: FAMILY MEDICINE

## 2020-10-12 RX ORDER — GLUCOSAMINE HCL/CHONDROITIN SU 500-400 MG
CAPSULE ORAL
Qty: 100 STRIP | Refills: 5 | Status: SHIPPED | OUTPATIENT
Start: 2020-10-12

## 2020-10-12 RX ORDER — ATORVASTATIN CALCIUM 20 MG/1
TABLET, FILM COATED ORAL
Qty: 90 TABLET | Refills: 1
Start: 2020-10-12 | End: 2021-01-04 | Stop reason: SDUPTHER

## 2020-10-12 RX ORDER — ATORVASTATIN CALCIUM 20 MG/1
20 TABLET, FILM COATED ORAL DAILY
Qty: 90 TABLET | Refills: 1 | Status: SHIPPED | OUTPATIENT
Start: 2020-10-12 | End: 2020-10-12 | Stop reason: SDUPTHER

## 2020-10-12 RX ORDER — LANCETS 30 GAUGE
EACH MISCELLANEOUS
Qty: 100 EACH | Refills: 5 | Status: SHIPPED | OUTPATIENT
Start: 2020-10-12

## 2020-10-12 NOTE — TELEPHONE ENCOUNTER
I informed patient about results. Worsening of LDL cholesterol. Increase atorvastatin to 20 mg daily. Patient has 10 mg tablets and will be taking 2 until gone, then we will call prescription.

## 2020-10-21 ENCOUNTER — TELEPHONE (OUTPATIENT)
Dept: FAMILY MEDICINE CLINIC | Age: 65
End: 2020-10-21

## 2020-10-23 ENCOUNTER — OFFICE VISIT (OUTPATIENT)
Dept: FAMILY MEDICINE CLINIC | Age: 65
End: 2020-10-23
Payer: COMMERCIAL

## 2020-10-23 VITALS
WEIGHT: 226.2 LBS | HEART RATE: 77 BPM | BODY MASS INDEX: 40.07 KG/M2 | RESPIRATION RATE: 16 BRPM | OXYGEN SATURATION: 95 % | TEMPERATURE: 97.3 F | DIASTOLIC BLOOD PRESSURE: 70 MMHG | SYSTOLIC BLOOD PRESSURE: 136 MMHG

## 2020-10-23 PROCEDURE — 99214 OFFICE O/P EST MOD 30 MIN: CPT | Performed by: FAMILY MEDICINE

## 2020-10-23 RX ORDER — BLOOD SUGAR DIAGNOSTIC
STRIP MISCELLANEOUS
Qty: 300 STRIP | Refills: 3 | Status: SHIPPED | OUTPATIENT
Start: 2020-10-23

## 2020-10-23 RX ORDER — LISINOPRIL 10 MG/1
10 TABLET ORAL DAILY
Qty: 30 TABLET | Refills: 1 | Status: SHIPPED | OUTPATIENT
Start: 2020-10-23 | End: 2020-10-26

## 2020-10-23 NOTE — PROGRESS NOTES
Patient is here for elevated blood pressure readings. Her blood pressure at home has been 140-170s/ 80s. Her headaches have been daily for 2 months. Her headaches do not awaken her, but she awakens with a headache . She awoke with throat pain on Saturday at 3 am. Denies chest pain . Denies heartburn / indigestion, but she did eat late . She ate shrimp spicy at 9 :30 pm and went to bed midnight . Usually eats at 5:30 pm.  No cough. No fever. Review of Systems    ROS: All other systems were reviewed and are negative . Patient's allergies and medications were reviewed. Patient's past medical, surgical, social , and family history were reviewed. OBJECTIVE:  /70   Pulse 77   Temp 97.3 °F (36.3 °C) (Oral)   Resp 16   Wt 226 lb 3.2 oz (102.6 kg)   SpO2 95%   BMI 40.07 kg/m²     Physical Exam    General: NAD, cooperative, alert and oriented X 3. Mood / affect is good. good insight. well hydrated. Neck : no lymphadenopathy, supple, FROM  CV: Regular rate and rhythm , no murmurs/ rub/ gallop. No edema. Lungs : CTA bilaterally, breathing comfortably  Abdomen: positive bowel sounds, soft , non tender, non distended. No hepatosplenomegaly. No CVA tenderness. Skin: no rashes. Non tender. ASSESSMENT/  PLAN:  1. Essential hypertension  - Newly diagnosed. Continue low sodium diet. - Start Lisinopril (PRINIVIL;ZESTRIL) 10 MG tablet; Take 1 tablet by mouth daily  Dispense: 30 tablet; Refill: 1. Side effects of medication discussed, including angioedema. - avoid caffeine / alcohol and decongestants. Weight loss recommended. 2. Gastroesophageal reflux disease without esophagitis  - discussed dietary/ lifestyle modifications, including avoiding eating late at night and avoiding spicy foods. 3. Controlled type 2 diabetes mellitus without complication, without long-term current use of insulin (HCC)  - stable.  HgbA1c= 6.2 in 9/2020.     4. Class 2 severe obesity with serious comorbidity and body mass index (BMI) of 39.0 to 39.9 in adult, unspecified obesity type ([address]  - Weight loss recommended. Discussed dietary/ lifestyle modifications, including cardiovascular exercise > 150 minutes/ week. Follow up 4 -6 weeks/ prn.

## 2020-10-26 RX ORDER — LISINOPRIL 10 MG/1
10 TABLET ORAL DAILY
Qty: 90 TABLET | Refills: 0 | Status: SHIPPED | OUTPATIENT
Start: 2020-10-26 | End: 2021-01-05 | Stop reason: SINTOL

## 2020-10-26 NOTE — TELEPHONE ENCOUNTER
Requested Prescriptions     Pending Prescriptions Disp Refills    lisinopril (PRINIVIL;ZESTRIL) 10 MG tablet [Pharmacy Med Name: LISINOPRIL 10MG TABLETS] 90 tablet      Sig: TAKE 1 TABLET BY MOUTH DAILY     LOV:10/23/2020  FOV:11/23/2020

## 2020-11-23 ENCOUNTER — OFFICE VISIT (OUTPATIENT)
Dept: FAMILY MEDICINE CLINIC | Age: 65
End: 2020-11-23
Payer: COMMERCIAL

## 2020-11-23 VITALS
WEIGHT: 232.2 LBS | HEART RATE: 93 BPM | TEMPERATURE: 97.4 F | BODY MASS INDEX: 41.13 KG/M2 | OXYGEN SATURATION: 98 % | DIASTOLIC BLOOD PRESSURE: 76 MMHG | SYSTOLIC BLOOD PRESSURE: 142 MMHG | RESPIRATION RATE: 16 BRPM

## 2020-11-23 PROCEDURE — 99214 OFFICE O/P EST MOD 30 MIN: CPT | Performed by: FAMILY MEDICINE

## 2020-11-23 RX ORDER — LOSARTAN POTASSIUM 50 MG/1
50 TABLET ORAL DAILY
Qty: 30 TABLET | Refills: 1 | Status: SHIPPED | OUTPATIENT
Start: 2020-11-23 | End: 2021-03-22 | Stop reason: DRUGHIGH

## 2020-11-23 NOTE — PROGRESS NOTES
Patient is here for follow up of hypertension. She had dizziness and stomache with Lisinopril. She tried for 3 days and tried again today with similar side effects . No cough . Slight throat sensation . Denies fever, chest pain , shortness of breath or cough. Review of Systems    ROS: All other systems were reviewed and are negative . Patient's allergies and medications were reviewed. Patient's past medical, surgical, social , and family history were reviewed. OBJECTIVE:  BP (!) 142/76   Pulse 93   Temp 97.4 °F (36.3 °C) (Oral)   Resp 16   Wt 232 lb 3.2 oz (105.3 kg)   SpO2 98%   BMI 41.13 kg/m²     Physical Exam    General: NAD, cooperative, alert and oriented X 3. Mood / affect is good. good insight. well hydrated. Neck : no lymphadenopathy, supple, FROM  CV: Regular rate and rhythm , no murmurs/ rub/ gallop. No edema. Lungs : CTA bilaterally, breathing comfortably  Abdomen: positive bowel sounds, soft , non tender, non distended. No hepatosplenomegaly. No CVA tenderness. Skin: no rashes. Non tender. ASSESSMENT/  PLAN:  1. Essential hypertension  - had side effects with Lisinopril .  - Change to Losartan (COZAAR) 50 MG tablet; Take 1 tablet by mouth daily  Dispense: 30 tablet; Refill: 1    2. Morbid obesity due to excess calories (HCC)  - Weight loss recommended. Discussed dietary/ lifestyle modifications, including cardiovascular exercise > 150 minutes/ week. 3. Controlled type 2 diabetes mellitus without complication, without long-term current use of insulin (HCC)  - HgbA1c= 6.2 in 9/19.   - Continue dietary/ lifestyle modifications, including decreased concentrated sweets and carbohydrates. Follow up 4 -6 weeks/ prn.

## 2021-01-04 ENCOUNTER — OFFICE VISIT (OUTPATIENT)
Dept: FAMILY MEDICINE CLINIC | Age: 66
End: 2021-01-04
Payer: COMMERCIAL

## 2021-01-04 VITALS
TEMPERATURE: 97.2 F | SYSTOLIC BLOOD PRESSURE: 150 MMHG | RESPIRATION RATE: 16 BRPM | WEIGHT: 236.8 LBS | BODY MASS INDEX: 41.95 KG/M2 | DIASTOLIC BLOOD PRESSURE: 80 MMHG | OXYGEN SATURATION: 96 % | HEART RATE: 95 BPM

## 2021-01-04 DIAGNOSIS — E11.9 CONTROLLED TYPE 2 DIABETES MELLITUS WITHOUT COMPLICATION, WITHOUT LONG-TERM CURRENT USE OF INSULIN (HCC): ICD-10-CM

## 2021-01-04 DIAGNOSIS — J45.20 MILD INTERMITTENT ASTHMA WITHOUT COMPLICATION: Chronic | ICD-10-CM

## 2021-01-04 DIAGNOSIS — I10 ESSENTIAL HYPERTENSION: Primary | Chronic | ICD-10-CM

## 2021-01-04 DIAGNOSIS — E78.2 MIXED HYPERLIPIDEMIA: Chronic | ICD-10-CM

## 2021-01-04 PROCEDURE — 90750 HZV VACC RECOMBINANT IM: CPT | Performed by: FAMILY MEDICINE

## 2021-01-04 PROCEDURE — 99214 OFFICE O/P EST MOD 30 MIN: CPT | Performed by: FAMILY MEDICINE

## 2021-01-04 PROCEDURE — 90471 IMMUNIZATION ADMIN: CPT | Performed by: FAMILY MEDICINE

## 2021-01-04 RX ORDER — ATORVASTATIN CALCIUM 20 MG/1
TABLET, FILM COATED ORAL
Qty: 90 TABLET | Refills: 1 | Status: SHIPPED | OUTPATIENT
Start: 2021-01-04 | End: 2021-09-13

## 2021-01-04 RX ORDER — ALBUTEROL SULFATE 90 UG/1
2 AEROSOL, METERED RESPIRATORY (INHALATION) EVERY 6 HOURS PRN
Qty: 1 INHALER | Refills: 2 | Status: SHIPPED | OUTPATIENT
Start: 2021-01-04

## 2021-01-04 ASSESSMENT — PATIENT HEALTH QUESTIONNAIRE - PHQ9
SUM OF ALL RESPONSES TO PHQ QUESTIONS 1-9: 0
2. FEELING DOWN, DEPRESSED OR HOPELESS: 0
SUM OF ALL RESPONSES TO PHQ QUESTIONS 1-9: 0

## 2021-01-04 NOTE — PROGRESS NOTES
Patient is here for follow up of hypertension. She checks her blood pressure and usually 130-140s/  60-70s. She is more active - walking. Cough improved off the Lisinopril. She is tolerating Losartan without side effects. No headaches, except related to wearing the mask . Denies fever, chest pain , shortness of breath or cough. Review of Systems    ROS: All other systems were reviewed and are negative . Patient's allergies and medications were reviewed. Patient's past medical, surgical, social , and family history were reviewed. BP Readings from Last 3 Encounters:   01/04/21 (!) 150/80   11/23/20 (!) 142/76   10/23/20 136/70       Wt Readings from Last 3 Encounters:   01/04/21 236 lb 12.8 oz (107.4 kg)   11/23/20 232 lb 3.2 oz (105.3 kg)   10/23/20 226 lb 3.2 oz (102.6 kg)     OBJECTIVE:  BP (!) 150/80   Pulse 95   Temp 97.2 °F (36.2 °C) (Oral)   Resp 16   Wt 236 lb 12.8 oz (107.4 kg)   SpO2 96%   BMI 41.95 kg/m²     Physical Exam    General: NAD, cooperative, alert and oriented X 3. Mood / affect is good. good insight. well hydrated. Neck : no lymphadenopathy, supple, FROM  CV: Regular rate and rhythm , no murmurs/ rub/ gallop. No edema. Lungs : CTA bilaterally, breathing comfortably  Abdomen: positive bowel sounds, soft , non tender, non distended. No hepatosplenomegaly. No CVA tenderness. Skin: no rashes. Non tender. ASSESSMENT/  PLAN:  1. Essential hypertension  - Improved, but still elevated. - Patient requested to hold on increasing Losartan to 100 mg qd, but rather preferred to continue 50 mg qd and work on low sodium diet, weight loss and exercise. 2. Mixed hyperlipidemia  - stable. Continue Lipitor and low cholesterol diet. - atorvastatin (LIPITOR) 20 MG tablet; TAKE 1 TABLET BY MOUTH ONE TIME A DAY  Dispense: 90 tablet; Refill: 1    3. Mild intermittent asthma without complication  - stable. - albuterol sulfate HFA (PROAIR HFA) 108 (90 Base) MCG/ACT inhaler;  Inhale 2 puffs into the lungs every 6 hours as needed for Wheezing or Shortness of Breath  Dispense: 1 Inhaler; Refill: 2    4. Controlled type 2 diabetes mellitus without complication, without long-term current use of insulin (HCC)  - Controlled. HgbA1c= 6.2 in 9/2020.   - Continue dietary/ lifestyle modifications, including decreased concentrated sweets and carbohydrates. Follow up 6 weeks/ prn.

## 2021-03-22 ENCOUNTER — OFFICE VISIT (OUTPATIENT)
Dept: FAMILY MEDICINE CLINIC | Age: 66
End: 2021-03-22
Payer: COMMERCIAL

## 2021-03-22 VITALS
DIASTOLIC BLOOD PRESSURE: 80 MMHG | OXYGEN SATURATION: 98 % | BODY MASS INDEX: 41.77 KG/M2 | TEMPERATURE: 97.9 F | RESPIRATION RATE: 18 BRPM | SYSTOLIC BLOOD PRESSURE: 142 MMHG | HEART RATE: 83 BPM | WEIGHT: 235.8 LBS

## 2021-03-22 DIAGNOSIS — E03.9 ACQUIRED HYPOTHYROIDISM: Chronic | ICD-10-CM

## 2021-03-22 DIAGNOSIS — J30.9 ALLERGIC RHINITIS, UNSPECIFIED SEASONALITY, UNSPECIFIED TRIGGER: Primary | ICD-10-CM

## 2021-03-22 DIAGNOSIS — I10 ESSENTIAL HYPERTENSION: Chronic | ICD-10-CM

## 2021-03-22 DIAGNOSIS — R06.83 SNORING: ICD-10-CM

## 2021-03-22 PROCEDURE — 99214 OFFICE O/P EST MOD 30 MIN: CPT | Performed by: FAMILY MEDICINE

## 2021-03-22 RX ORDER — LOSARTAN POTASSIUM 50 MG/1
50 TABLET ORAL DAILY
Qty: 30 TABLET | Refills: 1 | Status: CANCELLED | OUTPATIENT
Start: 2021-03-22

## 2021-03-22 RX ORDER — LOSARTAN POTASSIUM 100 MG/1
100 TABLET ORAL DAILY
Qty: 90 TABLET | Refills: 1 | Status: SHIPPED | OUTPATIENT
Start: 2021-03-22 | End: 2021-06-23 | Stop reason: SINTOL

## 2021-03-22 RX ORDER — LEVOTHYROXINE SODIUM 0.1 MG/1
TABLET ORAL
Qty: 90 TABLET | Refills: 1 | Status: SHIPPED | OUTPATIENT
Start: 2021-03-22 | End: 2021-12-02 | Stop reason: SDUPTHER

## 2021-03-22 NOTE — PROGRESS NOTES
Patient is here for follow up of hypertension . Her blood pressure at home is 140s/ 80- 90s . She is taking Losartan 50 mg qd. She is trying to walking 4 miles 3d/ week. She has not able to loose weight. She is active at work. 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 Some nasal congestion and post nasal drip X 2-3 weeks. She has h/o allergies . Snoring X 2-3 weeks. Taking Allegra qd. Denies fever, chest pain , shortness of breath or cough. Review of Systems    ROS: All other systems were reviewed and are negative . Patient's allergies and medications were reviewed. Patient's past medical, surgical, social , and family history were reviewed. Wt Readings from Last 3 Encounters:   03/22/21 235 lb 12.8 oz (107 kg)   01/04/21 236 lb 12.8 oz (107.4 kg)   11/23/20 232 lb 3.2 oz (105.3 kg)     OBJECTIVE:  BP (!) 142/80   Pulse 83   Temp 97.9 °F (36.6 °C) (Oral)   Resp 18   Wt 235 lb 12.8 oz (107 kg)   SpO2 98%   BMI 41.77 kg/m²     Physical Exam    General: NAD, cooperative, alert and oriented X 3. Mood / affect is good. good insight. well hydrated. HEENT: PERRLA, EOMI, TMs - clear. Nasopharynx clear. Neck : no lymphadenopathy, supple, FROM  CV: Regular rate and rhythm , no murmurs/ rub/ gallop. No edema. Lungs : CTA bilaterally, breathing comfortably  Abdomen: positive bowel sounds, soft , non tender, non distended. No hepatosplenomegaly. No CVA tenderness. Skin: no rashes. Non tender. ASSESSMENT/  PLAN:  1. Acquired hypothyroidism  - Stable. Continue Synthroid 0.100 mg qd. - levothyroxine (SYNTHROID) 100 MCG tablet; TAKE 1 TABLET BY MOUTH ONE TIME A DAY  Dispense: 90 tablet; Refill: 1    2. Essential hypertension  - Elevated today. - Increase Losartan (COZAAR) 100 MG tablet; Take 1 tablet by mouth daily  Dispense: 90 tablet; Refill: 1  - Follow low sodium diet. - Avoid caffeine/ alcohol.       3. Allergic rhinitis, unspecified seasonality, unspecified trigger  - add Flonase NS qhs.   - Continue Allegra qd prn.     4. Snoring  - monitor with adding Flonase NS qhs as snoring starting with increased congestion. Follow up 4 -6 weeks/ prn.

## 2021-03-23 DIAGNOSIS — E78.5 HYPERLIPIDEMIA, UNSPECIFIED HYPERLIPIDEMIA TYPE: ICD-10-CM

## 2021-03-23 DIAGNOSIS — E11.9 CONTROLLED TYPE 2 DIABETES MELLITUS WITHOUT COMPLICATION, WITHOUT LONG-TERM CURRENT USE OF INSULIN (HCC): ICD-10-CM

## 2021-03-23 DIAGNOSIS — I10 ESSENTIAL HYPERTENSION: Chronic | ICD-10-CM

## 2021-03-23 DIAGNOSIS — E03.9 ACQUIRED HYPOTHYROIDISM: Chronic | ICD-10-CM

## 2021-03-23 LAB
A/G RATIO: 0.8 (ref 1.1–2.2)
ALBUMIN SERPL-MCNC: 3.8 G/DL (ref 3.4–5)
ALP BLD-CCNC: 112 U/L (ref 40–129)
ALT SERPL-CCNC: 19 U/L (ref 10–40)
ANION GAP SERPL CALCULATED.3IONS-SCNC: 8 MMOL/L (ref 3–16)
AST SERPL-CCNC: 19 U/L (ref 15–37)
BILIRUB SERPL-MCNC: 0.3 MG/DL (ref 0–1)
BUN BLDV-MCNC: 14 MG/DL (ref 7–20)
CALCIUM SERPL-MCNC: 9.6 MG/DL (ref 8.3–10.6)
CHLORIDE BLD-SCNC: 105 MMOL/L (ref 99–110)
CHOLESTEROL, TOTAL: 161 MG/DL (ref 0–199)
CO2: 27 MMOL/L (ref 21–32)
CREAT SERPL-MCNC: <0.5 MG/DL (ref 0.6–1.2)
ESTIMATED AVERAGE GLUCOSE: 131.2 MG/DL
GFR AFRICAN AMERICAN: >60
GFR NON-AFRICAN AMERICAN: >60
GLOBULIN: 4.8 G/DL
GLUCOSE BLD-MCNC: 119 MG/DL (ref 70–99)
HBA1C MFR BLD: 6.2 %
HDLC SERPL-MCNC: 44 MG/DL (ref 40–60)
LDL CHOLESTEROL CALCULATED: 88 MG/DL
POTASSIUM SERPL-SCNC: 4.2 MMOL/L (ref 3.5–5.1)
SODIUM BLD-SCNC: 140 MMOL/L (ref 136–145)
T4 FREE: 1.1 NG/DL (ref 0.9–1.8)
TOTAL PROTEIN: 8.6 G/DL (ref 6.4–8.2)
TRIGL SERPL-MCNC: 143 MG/DL (ref 0–150)
TSH SERPL DL<=0.05 MIU/L-ACNC: 2.16 UIU/ML (ref 0.27–4.2)
VLDLC SERPL CALC-MCNC: 29 MG/DL

## 2021-05-12 ENCOUNTER — OFFICE VISIT (OUTPATIENT)
Dept: FAMILY MEDICINE CLINIC | Age: 66
End: 2021-05-12
Payer: COMMERCIAL

## 2021-05-12 VITALS
SYSTOLIC BLOOD PRESSURE: 110 MMHG | DIASTOLIC BLOOD PRESSURE: 65 MMHG | TEMPERATURE: 97.5 F | HEART RATE: 70 BPM | RESPIRATION RATE: 20 BRPM | OXYGEN SATURATION: 98 % | BODY MASS INDEX: 40.28 KG/M2 | WEIGHT: 227.4 LBS

## 2021-05-12 DIAGNOSIS — I10 ESSENTIAL HYPERTENSION: Primary | ICD-10-CM

## 2021-05-12 DIAGNOSIS — R53.83 FATIGUE, UNSPECIFIED TYPE: ICD-10-CM

## 2021-05-12 PROCEDURE — 99213 OFFICE O/P EST LOW 20 MIN: CPT | Performed by: FAMILY MEDICINE

## 2021-05-12 RX ORDER — LOSARTAN POTASSIUM 50 MG/1
50 TABLET ORAL DAILY
Qty: 30 TABLET | Refills: 1 | Status: SHIPPED | OUTPATIENT
Start: 2021-05-12 | End: 2021-06-09

## 2021-05-12 RX ORDER — HYDROCHLOROTHIAZIDE 25 MG/1
25 TABLET ORAL EVERY MORNING
Qty: 30 TABLET | Refills: 1 | Status: SHIPPED | OUTPATIENT
Start: 2021-05-12 | End: 2022-10-20 | Stop reason: SDUPTHER

## 2021-05-12 NOTE — PROGRESS NOTES
Patient is here for follow up of hypertension . She noticed fatigue with increased dose of Losartan 100 mg qd from 50 mg qd. No cough . No headaches . She feels foggy on the increased dose of Losartan. She takes Losartan in the pm.  Her blood pressure was usually 110-120s/60-70s. She tolerated Losartan 50 mg qd without side effects . Some mild nausea with Losartan 100 mg too. Denies fever, chest pain , shortness of breath or cough. Review of Systems    ROS: All other systems were reviewed and are negative . Patient's allergies and medications were reviewed. Patient's past medical, surgical, social , and family history were reviewed. BP Readings from Last 3 Encounters:   05/12/21 118/70   03/22/21 (!) 142/80   01/04/21 (!) 150/80     OBJECTIVE:  /70   Pulse 70   Temp 97.5 °F (36.4 °C)   Resp 20   Wt 227 lb 6.4 oz (103.1 kg)   SpO2 98%   BMI 40.28 kg/m²     Physical Exam    General: NAD, cooperative, alert and oriented X 3. Mood / affect is good. good insight. well hydrated. Neck : no lymphadenopathy, supple, FROM  CV: Regular rate and rhythm , no murmurs/ rub/ gallop. No edema. Lungs : CTA bilaterally, breathing comfortably  Abdomen: positive bowel sounds, soft , non tender, non distended. No hepatosplenomegaly. No CVA tenderness. Skin: no rashes. Non tender. ASSESSMENT/  PLAN:  1. Essential hypertension  - Add HydroCHLOROthiazide (HYDRODIURIL) 25 MG tablet; Take 1 tablet by mouth every morning  Dispense: 30 tablet; Refill: 1  - Decrease Losartan to 50 mg qd given side effects with 100 mg dose (fatigue/ GI side effects ). -  Losartan (COZAAR) 50 MG tablet; Take 1 tablet by mouth daily  Dispense: 30 tablet; Refill: 1  - Follow low sodium diet. Avoid caffeine/ alcohol/ and decongestants. 2. Fatigue, unspecified type  - Monitor with decrease back to 50 mg of Losartan. Follow up 4 -6 weeks/ prn.

## 2021-06-08 DIAGNOSIS — I10 ESSENTIAL HYPERTENSION: ICD-10-CM

## 2021-06-09 RX ORDER — LOSARTAN POTASSIUM 50 MG/1
50 TABLET ORAL DAILY
Qty: 90 TABLET | Refills: 0 | Status: SHIPPED | OUTPATIENT
Start: 2021-06-09 | End: 2021-06-23 | Stop reason: SDUPTHER

## 2021-06-23 ENCOUNTER — OFFICE VISIT (OUTPATIENT)
Dept: FAMILY MEDICINE CLINIC | Age: 66
End: 2021-06-23
Payer: COMMERCIAL

## 2021-06-23 VITALS
SYSTOLIC BLOOD PRESSURE: 118 MMHG | DIASTOLIC BLOOD PRESSURE: 74 MMHG | RESPIRATION RATE: 19 BRPM | HEART RATE: 76 BPM | BODY MASS INDEX: 40.99 KG/M2 | OXYGEN SATURATION: 96 % | WEIGHT: 231.4 LBS | TEMPERATURE: 97.1 F

## 2021-06-23 DIAGNOSIS — J45.20 MILD INTERMITTENT ASTHMA WITHOUT COMPLICATION: ICD-10-CM

## 2021-06-23 DIAGNOSIS — E11.9 CONTROLLED TYPE 2 DIABETES MELLITUS WITHOUT COMPLICATION, WITHOUT LONG-TERM CURRENT USE OF INSULIN (HCC): ICD-10-CM

## 2021-06-23 DIAGNOSIS — E66.01 MORBID OBESITY DUE TO EXCESS CALORIES (HCC): ICD-10-CM

## 2021-06-23 DIAGNOSIS — I10 ESSENTIAL HYPERTENSION: ICD-10-CM

## 2021-06-23 DIAGNOSIS — E03.9 ACQUIRED HYPOTHYROIDISM: ICD-10-CM

## 2021-06-23 DIAGNOSIS — E11.9 CONTROLLED TYPE 2 DIABETES MELLITUS WITHOUT COMPLICATION, WITHOUT LONG-TERM CURRENT USE OF INSULIN (HCC): Primary | ICD-10-CM

## 2021-06-23 DIAGNOSIS — E78.2 MIXED HYPERLIPIDEMIA: ICD-10-CM

## 2021-06-23 DIAGNOSIS — Z11.4 ENCOUNTER FOR SCREENING FOR HIV: ICD-10-CM

## 2021-06-23 DIAGNOSIS — Z23 NEED FOR VACCINATION FOR DTAP: ICD-10-CM

## 2021-06-23 DIAGNOSIS — R49.0 HOARSENESS: ICD-10-CM

## 2021-06-23 PROCEDURE — 99214 OFFICE O/P EST MOD 30 MIN: CPT | Performed by: FAMILY MEDICINE

## 2021-06-23 RX ORDER — LOSARTAN POTASSIUM 50 MG/1
50 TABLET ORAL DAILY
Qty: 90 TABLET | Refills: 0 | Status: SHIPPED | OUTPATIENT
Start: 2021-06-23 | End: 2021-09-07

## 2021-06-23 SDOH — ECONOMIC STABILITY: FOOD INSECURITY: WITHIN THE PAST 12 MONTHS, YOU WORRIED THAT YOUR FOOD WOULD RUN OUT BEFORE YOU GOT MONEY TO BUY MORE.: NEVER TRUE

## 2021-06-23 SDOH — ECONOMIC STABILITY: FOOD INSECURITY: WITHIN THE PAST 12 MONTHS, THE FOOD YOU BOUGHT JUST DIDN'T LAST AND YOU DIDN'T HAVE MONEY TO GET MORE.: NEVER TRUE

## 2021-06-23 ASSESSMENT — SOCIAL DETERMINANTS OF HEALTH (SDOH): HOW HARD IS IT FOR YOU TO PAY FOR THE VERY BASICS LIKE FOOD, HOUSING, MEDICAL CARE, AND HEATING?: NOT HARD AT ALL

## 2021-06-23 NOTE — PROGRESS NOTES
P  SUBJECTIVE:  63 y.o. Dontae Neighbours female  for follow up of diabetes, hypertension, and hypercholesterolemia. Diabetic Review of Systems - medication compliance: compliant most of the time, diabetic diet compliance: noncompliant some of the time, home glucose monitoring: fasting values range 78-110s , further diabetic ROS: no polyuria or polydipsia, no chest pain, dyspnea or TIA's, no numbness, tingling or pain in extremities, last eye exam approximately - overdue. She has had hoarseness daily X 1 week, but not sure of timing. Intermittent . Usually drinks mostly water. She noticed it today earlier. No snoring. No dysphagia. No heartburn/ indigestion . No sore throat . No caffeine or alcohol. Breakfast was croissant and decaf coffee;  Lunch was chicken noodle soup & salad. She eats spicy foods. Current Outpatient Medications   Medication Sig Dispense Refill    losartan (COZAAR) 50 MG tablet TAKE 1 TABLET BY MOUTH DAILY 90 tablet 0    hydroCHLOROthiazide (HYDRODIURIL) 25 MG tablet Take 1 tablet by mouth every morning 30 tablet 1    levothyroxine (SYNTHROID) 100 MCG tablet TAKE 1 TABLET BY MOUTH ONE TIME A DAY 90 tablet 1    atorvastatin (LIPITOR) 20 MG tablet TAKE 1 TABLET BY MOUTH ONE TIME A DAY 90 tablet 1    albuterol sulfate HFA (PROAIR HFA) 108 (90 Base) MCG/ACT inhaler Inhale 2 puffs into the lungs every 6 hours as needed for Wheezing or Shortness of Breath 1 Inhaler 2    ONETOUCH ULTRA strip TEST 1 TIME A DAY AND AS NEEDED FOR SYMPTOMS OF FLUCTUATING GLUCOSE 300 strip 3    blood glucose monitor kit and supplies Check Blood sugars once daily and as needed. Include: monitor, strips, lancing device, lancets, control solutions, alcohol swabs. 1 kit 0    Lancets MISC Test 1 times a day & as needed for symptoms of fluctuating blood glucose. Dispense sufficient amount . 100 each 5    blood glucose monitor strips Test 1 times a day & as needed for symptoms of fluctuating blood glucose.  Dispense sufficient amount . 100 strip 5    ONETOUCH VERIO strip 1 each by In Vitro route 2 times daily 100 each 3    ONETOUCH DELICA LANCETS 66E MISC 1 each by Does not apply route 2 times daily 60 each 5    fexofenadine (ALLEGRA) 180 MG tablet Take 1 tablet by mouth daily as needed (for allergies)      losartan (COZAAR) 100 MG tablet Take 1 tablet by mouth daily (Patient not taking: Reported on 6/23/2021) 90 tablet 1    Blood Glucose Monitoring Suppl (ONETOUCH VERIO FLEX SYSTEM) w/Device KIT 1 Device by Does not apply route once for 1 dose 1 kit 0     No current facility-administered medications for this visit. ROS: All other systems were reviewed and were negative . Patient's allergies and medications were reviewed. Patient's past medical, surgical, social , and family history were reviewed. OBJECTIVE:  /74   Pulse 76   Temp 97.1 °F (36.2 °C)   Resp 19   Wt 231 lb 6.4 oz (105 kg)   SpO2 96%   BMI 40.99 kg/m²   General: NAD, cooperative, alert and oriented X 3, affect / mood is good. Good insight. well hydrated. Neck : no lymphadenopathy, supple, FROM  CV: Regular rate and rhythm , no murmurs/ rub/ gallop. No edema. Lungs : CTA bilaterally, breathing comfortably  Abdomen: positive bowel sounds, soft , non tender, non distended. No hepatosplenomegaly. Feet: normal sensation to monofilament bilaterally, no ulcers. DP/ PT pulses normal.   Skin: no rashes. Non tender. ASSESSMENT:  1. Controlled type 2 diabetes mellitus without complication, without long-term current use of insulin ([facility])  - HgbA1c= 6.2 in 3/21. Continue dietary/ lifestyle modifications. - Comprehensive Metabolic Panel; Future  - Hemoglobin A1C; Future    2. Essential hypertension  - Controlled. Continue Losartan 50 mg qd, HCTZ 25 mg qd and low sodium diet. - Comprehensive Metabolic Panel; Future  - losartan (COZAAR) 50 MG tablet; Take 1 tablet by mouth daily  Dispense: 90 tablet; Refill: 0    3.  Mixed hyperlipidemia  - Controlled. Continue Lipitor 20 mg qd and low cholesterol diet. - Comprehensive Metabolic Panel; Future    4. Acquired hypothyroidism  - Controlled. Continue Synthroid 0.100 mg qd.     5. Hoarseness  - Recommended dietary/ lifestyle modifications, including avoid spicy foods. Continue to avoid caffeine , alcohol. 6. Mild intermittent asthma without complication  - Stable. Albuterol HFA prn.     7. Morbid obesity due to excess calories ([address]  - Weight loss recommended. Discussed dietary/ lifestyle modifications, including cardiovascular exercise > 150 minutes/ week. 8. Encounter for screening for HIV  - HIV Screen; Future    9. Need for vaccination for DTaP  - patient to check prior whether she was given dT of TdaP at Urgent Care after finger injury previously. PLAN:  See orders for this visit as documented in the electronic medical record. Issues reviewed with her: low cholesterol diet, weight control and daily exercise discussed, home glucose monitoring emphasized, all medications, side effects and compliance discussed carefully, foot care discussed and Podiatry visits discussed, annual eye examinations at Ophthalmology discussed, glycohemoglobin and other lab monitoring discussed and long term diabetic complications discussed. Goals:   1) Blood pressure < 130/80  2) HGA1C ideal less than 6.5, at least less than 7.0  3) LDL cholesterol < 100  4) Exercise 150 minutes a week   5) Dilated eye exam by an optometrist or ophthalmologist once a year  6) Fasting blood sugar < 110  7) Glucose 2 hours after meal < 140  8) Aspirin 81 mg once a day  9) BMI (Body Mass Index) ideal < 25, at least less than 30. Weight loss of even 10 to 15 pounds is effective in improving diabetes  9) Total fat intake to less than 60 grams per day and saturated fat to less than 20 grams per day. 10) Diabetic education and diabetic nutrition classes. [phone]) Doctor visits every 3 months.     Follow up 3 months/ prn.

## 2021-06-24 DIAGNOSIS — R77.9 ELEVATED BLOOD PROTEIN: Primary | ICD-10-CM

## 2021-06-24 LAB
A/G RATIO: 0.7 (ref 1.1–2.2)
ALBUMIN SERPL-MCNC: 3.8 G/DL (ref 3.4–5)
ALP BLD-CCNC: 128 U/L (ref 40–129)
ALT SERPL-CCNC: 23 U/L (ref 10–40)
ANION GAP SERPL CALCULATED.3IONS-SCNC: 13 MMOL/L (ref 3–16)
AST SERPL-CCNC: 24 U/L (ref 15–37)
BILIRUB SERPL-MCNC: <0.2 MG/DL (ref 0–1)
BUN BLDV-MCNC: 11 MG/DL (ref 7–20)
CALCIUM SERPL-MCNC: 10 MG/DL (ref 8.3–10.6)
CHLORIDE BLD-SCNC: 102 MMOL/L (ref 99–110)
CO2: 23 MMOL/L (ref 21–32)
CREAT SERPL-MCNC: 0.5 MG/DL (ref 0.6–1.2)
ESTIMATED AVERAGE GLUCOSE: 128.4 MG/DL
GFR AFRICAN AMERICAN: >60
GFR NON-AFRICAN AMERICAN: >60
GLOBULIN: 5.3 G/DL
GLUCOSE BLD-MCNC: 116 MG/DL (ref 70–99)
HBA1C MFR BLD: 6.1 %
HIV AG/AB: NORMAL
HIV ANTIGEN: NORMAL
HIV-1 ANTIBODY: NORMAL
HIV-2 AB: NORMAL
POTASSIUM SERPL-SCNC: 4 MMOL/L (ref 3.5–5.1)
SODIUM BLD-SCNC: 138 MMOL/L (ref 136–145)
TOTAL PROTEIN: 9.1 G/DL (ref 6.4–8.2)

## 2021-06-25 DIAGNOSIS — R77.9 ELEVATED BLOOD PROTEIN: ICD-10-CM

## 2021-06-28 LAB — SPE/IFE INTERPRETATION: NORMAL

## 2021-06-29 LAB
ALBUMIN SERPL-MCNC: 2.6 G/DL (ref 3.1–4.9)
ALPHA-1-GLOBULIN: 0.2 G/DL (ref 0.2–0.4)
ALPHA-2-GLOBULIN: 0.8 G/DL (ref 0.4–1.1)
BETA GLOBULIN: 0.9 G/DL (ref 0.9–1.6)
GAMMA GLOBULIN: 2.5 G/DL (ref 0.6–1.8)
M SPIKE 1 SERUM PROTEIN ELEC: 2.1 G/DL
TOTAL PROTEIN: 9 G/DL (ref 6.4–8.2)

## 2021-09-06 DIAGNOSIS — I10 ESSENTIAL HYPERTENSION: ICD-10-CM

## 2021-09-07 RX ORDER — LOSARTAN POTASSIUM 50 MG/1
50 TABLET ORAL DAILY
Qty: 90 TABLET | Refills: 0 | Status: SHIPPED | OUTPATIENT
Start: 2021-09-07 | End: 2021-12-02

## 2021-09-07 NOTE — TELEPHONE ENCOUNTER
Requested Prescriptions     Pending Prescriptions Disp Refills    losartan (COZAAR) 50 MG tablet [Pharmacy Med Name: LOSARTAN 50MG TABLETS] 90 tablet 0     Sig: TAKE 1 TABLET BY MOUTH DAILY       LOV 6/23/2021  No f/u  Labs 6/23/21

## 2021-09-12 DIAGNOSIS — E78.2 MIXED HYPERLIPIDEMIA: Chronic | ICD-10-CM

## 2021-09-13 RX ORDER — ATORVASTATIN CALCIUM 20 MG/1
TABLET, FILM COATED ORAL
Qty: 90 TABLET | Refills: 1 | Status: SHIPPED | OUTPATIENT
Start: 2021-09-13 | End: 2021-12-02 | Stop reason: SDUPTHER

## 2021-09-13 NOTE — TELEPHONE ENCOUNTER
Requested Prescriptions     Pending Prescriptions Disp Refills    atorvastatin (LIPITOR) 20 MG tablet [Pharmacy Med Name: ATORVASTATIN 20MG TABLETS] 90 tablet 1     Sig: TAKE 1 TABLET BY MOUTH EVERY DAY     Last ov 6/23/21  Last labs 6/23/21  No f/u

## 2021-10-26 ENCOUNTER — TELEPHONE (OUTPATIENT)
Dept: ENDOCRINOLOGY | Age: 66
End: 2021-10-26

## 2021-10-26 DIAGNOSIS — E03.9 ACQUIRED HYPOTHYROIDISM: Primary | ICD-10-CM

## 2021-10-26 DIAGNOSIS — E11.9 CONTROLLED TYPE 2 DIABETES MELLITUS WITHOUT COMPLICATION, WITHOUT LONG-TERM CURRENT USE OF INSULIN (HCC): ICD-10-CM

## 2021-11-08 DIAGNOSIS — E03.9 ACQUIRED HYPOTHYROIDISM: ICD-10-CM

## 2021-11-08 DIAGNOSIS — E11.9 CONTROLLED TYPE 2 DIABETES MELLITUS WITHOUT COMPLICATION, WITHOUT LONG-TERM CURRENT USE OF INSULIN (HCC): ICD-10-CM

## 2021-11-08 LAB
A/G RATIO: 0.8 (ref 1.1–2.2)
ALBUMIN SERPL-MCNC: 3.8 G/DL (ref 3.4–5)
ALP BLD-CCNC: 125 U/L (ref 40–129)
ALT SERPL-CCNC: 18 U/L (ref 10–40)
ANION GAP SERPL CALCULATED.3IONS-SCNC: 10 MMOL/L (ref 3–16)
AST SERPL-CCNC: 35 U/L (ref 15–37)
BILIRUB SERPL-MCNC: 0.3 MG/DL (ref 0–1)
BUN BLDV-MCNC: 17 MG/DL (ref 7–20)
CALCIUM SERPL-MCNC: 9.7 MG/DL (ref 8.3–10.6)
CHLORIDE BLD-SCNC: 104 MMOL/L (ref 99–110)
CHOLESTEROL, TOTAL: 135 MG/DL (ref 0–199)
CO2: 23 MMOL/L (ref 21–32)
CREAT SERPL-MCNC: <0.5 MG/DL (ref 0.6–1.2)
CREATININE URINE: 44.8 MG/DL (ref 28–259)
ESTIMATED AVERAGE GLUCOSE: 119.8 MG/DL
GFR AFRICAN AMERICAN: >60
GFR NON-AFRICAN AMERICAN: >60
GLUCOSE BLD-MCNC: 85 MG/DL (ref 70–99)
HBA1C MFR BLD: 5.8 %
HDLC SERPL-MCNC: 47 MG/DL (ref 40–60)
LDL CHOLESTEROL CALCULATED: 64 MG/DL
MICROALBUMIN UR-MCNC: <1.2 MG/DL
MICROALBUMIN/CREAT UR-RTO: NORMAL MG/G (ref 0–30)
POTASSIUM SERPL-SCNC: 4.2 MMOL/L (ref 3.5–5.1)
SODIUM BLD-SCNC: 137 MMOL/L (ref 136–145)
T4 FREE: 1.4 NG/DL (ref 0.9–1.8)
TOTAL PROTEIN: 8.7 G/DL (ref 6.4–8.2)
TRIGL SERPL-MCNC: 122 MG/DL (ref 0–150)
TSH SERPL DL<=0.05 MIU/L-ACNC: 0.27 UIU/ML (ref 0.27–4.2)
VLDLC SERPL CALC-MCNC: 24 MG/DL

## 2021-11-09 ENCOUNTER — HOSPITAL ENCOUNTER (OUTPATIENT)
Dept: MAMMOGRAPHY | Age: 66
Discharge: HOME | End: 2021-11-09
Payer: COMMERCIAL

## 2021-11-09 VITALS — HEIGHT: 63 IN | WEIGHT: 234 LBS | BODY MASS INDEX: 41.46 KG/M2

## 2021-11-09 DIAGNOSIS — Z12.31 VISIT FOR SCREENING MAMMOGRAM: ICD-10-CM

## 2021-11-09 PROCEDURE — 77063 BREAST TOMOSYNTHESIS BI: CPT

## 2021-12-02 ENCOUNTER — OFFICE VISIT (OUTPATIENT)
Dept: ENDOCRINOLOGY | Age: 66
End: 2021-12-02
Payer: COMMERCIAL

## 2021-12-02 VITALS
HEIGHT: 63 IN | TEMPERATURE: 98 F | BODY MASS INDEX: 35.79 KG/M2 | SYSTOLIC BLOOD PRESSURE: 132 MMHG | RESPIRATION RATE: 14 BRPM | DIASTOLIC BLOOD PRESSURE: 78 MMHG | WEIGHT: 202 LBS | HEART RATE: 70 BPM | OXYGEN SATURATION: 97 %

## 2021-12-02 DIAGNOSIS — R79.89 ELEVATED LIVER FUNCTION TESTS: Chronic | ICD-10-CM

## 2021-12-02 DIAGNOSIS — E78.2 MIXED HYPERLIPIDEMIA: Chronic | ICD-10-CM

## 2021-12-02 DIAGNOSIS — Z98.84 STATUS POST GASTRIC BANDING: Chronic | ICD-10-CM

## 2021-12-02 DIAGNOSIS — E03.9 ACQUIRED HYPOTHYROIDISM: Chronic | ICD-10-CM

## 2021-12-02 DIAGNOSIS — E04.1 THYROID NODULE: ICD-10-CM

## 2021-12-02 DIAGNOSIS — E66.01 CLASS 2 SEVERE OBESITY WITH SERIOUS COMORBIDITY AND BODY MASS INDEX (BMI) OF 35.0 TO 35.9 IN ADULT, UNSPECIFIED OBESITY TYPE (HCC): ICD-10-CM

## 2021-12-02 DIAGNOSIS — E11.9 CONTROLLED TYPE 2 DIABETES MELLITUS WITHOUT COMPLICATION, WITHOUT LONG-TERM CURRENT USE OF INSULIN (HCC): Primary | ICD-10-CM

## 2021-12-02 DIAGNOSIS — I10 ESSENTIAL HYPERTENSION: Chronic | ICD-10-CM

## 2021-12-02 PROBLEM — E66.9 CLASS 2 OBESITY IN ADULT: Status: ACTIVE | Noted: 2021-12-02

## 2021-12-02 PROCEDURE — 99215 OFFICE O/P EST HI 40 MIN: CPT | Performed by: INTERNAL MEDICINE

## 2021-12-02 RX ORDER — LEVOTHYROXINE SODIUM 0.1 MG/1
TABLET ORAL
Qty: 90 TABLET | Refills: 1 | Status: SHIPPED | OUTPATIENT
Start: 2021-12-02 | End: 2022-05-04 | Stop reason: SDUPTHER

## 2021-12-02 RX ORDER — ATORVASTATIN CALCIUM 20 MG/1
TABLET, FILM COATED ORAL
Qty: 90 TABLET | Refills: 1 | Status: SHIPPED | OUTPATIENT
Start: 2021-12-02 | End: 2022-05-04 | Stop reason: SDUPTHER

## 2021-12-02 RX ORDER — LOSARTAN POTASSIUM 50 MG/1
50 TABLET ORAL DAILY
Qty: 90 TABLET | Refills: 1 | Status: SHIPPED | OUTPATIENT
Start: 2021-12-02 | End: 2022-05-04 | Stop reason: SDUPTHER

## 2021-12-02 NOTE — PROGRESS NOTES
Lyle Fuller is a 61 y.o. female who presents for Type 2 diabetes mellitus. Current symptoms/problems include no symptoms and show no change. 1.  Controlled type 2 diabetes mellitus without complication, without long-term current use of insulin ([facility] [E11.9]    Diagnosed with Type 2 diabetes mellitus in 2019. Comorbid conditions: hyperlipidemia    Current diabetic medications include: none    Intolerance to diabetes medications: No     Weight trend: stable  Prior visit with dietician: yes  Current diet: on average, 3 meals per day  Current exercise: walks     Current monitoring regimen: home blood tests - 1 times daily  Has brought blood glucose log/meter:  Yes  Home blood sugar records: fasting range: 70-80 and postprandial range:    Any episodes of hypoglycemia? No  Hypoglycemia frequency and time(s):    Does patient have Glucagon emergency kit? No  Does patient have rapid acting carbohydrate? No  Does patient wear a medic alert bracelet or necklace? No      2. Acquired hypothyroidism  This started in 2017. Patient was diagnosed with hypothyroidism. The problem has been gradually worsening. Patient started medication in 2017. Currently patient is on: levothyroxine. Misses  0 doses a month.     Current complaints: constipation. 3. Thyroid nodule  Right 7 mm nodule. No difficulty swallowing  History of obstructive symptoms: difficulty swallowing Yes, changes in voice/hoarseness Yes. 4. Status post gastric banding  Had lap banding, lost 100 lbs, then started gaining again. Now is watching her diet, lost weight. 5. Obesity  Fluctuating weight. Eats healthy. Has neuroma, asthma. Difficult to exercise.  for [email or web address]. 6. Elevated liver function tests  No nausea, vomiting.     7. Essential hypertension  No headaches        Past Medical History:   Diagnosis Date    Asthma     Elevated liver function tests 10/21/2016    Essential hypertension 12/12/2016    GERD (gastroesophageal reflux disease)     History of cystocele 9/12/2016    Hyperglycemia 10/21/2016    Hyperlipidemia     Hypertension     Hypothyroidism 10/21/2016    Migraine     Morbid obesity due to excess calories ([address] 10/21/2016    Reina's neuroma of left foot 10/21/2016    Non morbid obesity due to excess calories 9/12/2016    Obesity     Obstructive sleep apnea 10/21/2016    Osteoarthritis     Prediabetes 10/21/2016    Rectocele 9/12/2016    Seasonal allergic rhinitis 12/12/2016    Snoring 10/21/2016    Status post gastric banding 4/26/2010    Uterovaginal prolapse 9/12/2016      Patient Active Problem List   Diagnosis    Arthritis    Asthma    Hyperlipidemia    Status post gastric banding    Non morbid obesity due to excess calories    History of cystocele    Rectocele    Uterovaginal prolapse    Hypothyroidism    Hyperglycemia    Diabetes mellitus type 2, controlled, without complications ([address]    Elevated liver function tests    Reina's neuroma of left foot    Snoring    Obstructive sleep apnea    Morbid obesity due to excess calories (HCC)    Seasonal allergic rhinitis    Essential hypertension    Pain in right upper arm    Numbness and tingling of both upper extremities while sleeping    Arthritis of right acromioclavicular joint    Pain radiating to neck    Biceps tendinitis on right    Bursitis of right shoulder    Tendinitis of right rotator cuff    Chronic right shoulder pain    Class 2 obesity with body mass index (BMI) of 39.0 to 39.9 in adult    Thyroid nodule    Class 2 obesity in adult     Past Surgical History:   Procedure Laterality Date    ABDOMINOPLASTY  1996    [address]  09/21/2012    Dr. Bettina Pandey, [facility]      CHOLECYSTECTOMY      COLONOSCOPY  12/04/2013    Dr. Stephen Ruano    CYSTOSCOPY 09/21/2012    Dr. Ness Sanchez Left 10/25/2016    Dr. Burt Quiroz Left 01/2020    HYSTERECTOMY, VAGINAL  09/21/2012    Dr. Yan Salvage    LAP BAND  01/15/2010    Dr. Vinicio Foreman Left 07/26/2010    Dr. Karmen Xiong - incisional biopsy of left tonsillar mass   [address] [place] [address] UPPER GASTROINTESTINAL ENDOSCOPY  01/23/2015    Dr. Mac Parham  10/16/2010    Dr. Rosales Snow    work related injury, removed a bone     Social History     Socioeconomic History    Marital status:      Spouse name: Felicefferalicia Ink Number of children: 10    Years of education: Not on file    Highest education level: Not on file   Occupational History    Occupation:  and cleans houses     Comment: as of September 12, 2016   Tobacco Use    Smoking status: Never Smoker    Smokeless tobacco: Never Used   Vaping Use    Vaping Use: Never used   Substance and Sexual Activity    Alcohol use: No    Drug use: No    Sexual activity: Yes     Partners: Male     Comment:  - [facility]   Other Topics Concern    Not on file   Social History Narrative    Not on file     Social Determinants of Health     Financial Resource Strain: Low Risk     Difficulty of Paying Living Expenses: Not hard at all   Food Insecurity: No Food Insecurity    Worried About [address] in the Last Year: Never true    [address] in the Last Year: Never true   Transportation Needs:     Lack of Transportation (Medical): Not on file    Lack of Transportation (Non-Medical):  Not on file   Physical Activity:     Days of Exercise per Week: Not on file    Minutes of Exercise per Session: Not on file   Stress:     Feeling of Stress : Not on file   Social Connections:     Frequency of Communication with Friends and Family: Not on file    Frequency of Social Gatherings with Friends and Family: Not on file    Attends Mos[employer] Services: Not on file    Active Member of Clubs or Organizations: Not on file    Attends Club or Organization Meetings: Not on file    Marital Status: Not on file   Intimate Partner Violence:     Fear of Current or Ex-Partner: Not on file    Emotionally Abused: Not on file    Physically Abused: Not on file    Sexually Abused: Not on file   Housing Stability:     Unable to Pay for Housing in the Last Year: Not on file    Number of Places Lived in the Last Year: Not on file    Unstable Housing in the Last Year: Not on file     Family History   Problem Relation Age of Onset    Cancer Mother     Diabetes Father     High Blood Pressure Father     High Cholesterol Father     Heart Disease Father     Asthma Other     Uterine Cancer Other     Heart Disease Other     Depression Other     Diabetes Other     High Cholesterol Other     High Blood Pressure Other     Kidney Disease Other     Migraines Other     Stroke Other     Arthritis Other     No Known Problems Daughter     No Known Problems Daughter     No Known Problems Daughter     No Known Problems Daughter     No Known Problems Daughter     Other Sister         Brain tumor Surgery-didn't wake up    Breast Cancer Sister     Diabetes Brother     No Known Problems Sister     No Known Problems Sister     Cancer Brother         cancer    Cancer Brother         bone    No Known Problems Brother     No Known Problems Brother     No Known Problems Brother     No Known Problems Brother     No Known Problems Brother     No Known Problems Son      Current Outpatient Medications   Medication Sig Dispense Refill    levothyroxine (SYNTHROID) 100 MCG tablet TAKE 1 TABLET BY MOUTH ONE TIME A DAY 90 tablet 1    atorvastatin (LIPITOR) 20 MG tablet TAKE 1 TABLET BY MOUTH EVERY DAY 90 tablet 1    losartan (COZAAR) 50 MG tablet Take 1 tablet by mouth daily 90 tablet 1    metFORMIN (GLUCOPHAGE) 500 MG tablet Take 1 tablet by mouth 2 times daily (with meals) 180 tablet 1    albuterol sulfate HFA (PROAIR HFA) 108 (90 Base) MCG/ACT inhaler Inhale 2 puffs into the lungs every 6 hours as needed for Wheezing or Shortness of Breath 1 Inhaler 2    ONETOUCH ULTRA strip TEST 1 TIME A DAY AND AS NEEDED FOR SYMPTOMS OF FLUCTUATING GLUCOSE 300 strip 3    blood glucose monitor kit and supplies Check Blood sugars once daily and as needed. Include: monitor, strips, lancing device, lancets, control solutions, alcohol swabs. 1 kit 0    Lancets MISC Test 1 times a day & as needed for symptoms of fluctuating blood glucose. Dispense sufficient amount . 100 each 5    blood glucose monitor strips Test 1 times a day & as needed for symptoms of fluctuating blood glucose. Dispense sufficient amount . 100 strip 5    ONETOUCH VERIO strip 1 each by In Vitro route 2 times daily 100 each 3    Blood Glucose Monitoring Suppl (ONETOUCH VERIO FLEX SYSTEM) w/Device KIT 1 Device by Does not apply route once for 1 dose 1 kit 0    ONETOUCH DELICA LANCETS 87I MISC 1 each by Does not apply route 2 times daily 60 each 5    fexofenadine (ALLEGRA) 180 MG tablet Take 1 tablet by mouth daily as needed (for allergies)      hydroCHLOROthiazide (HYDRODIURIL) 25 MG tablet Take 1 tablet by mouth every morning (Patient not taking: Reported on 2021) 30 tablet 1     No current facility-administered medications for this visit. Allergies   Allergen Reactions    Latex Rash    Fruit & Vegetable Daily [Nutritional Supplements] Other (See Comments)     Vomiting, onion, tomato, watermelon, peach    Kiwi Extract      LIPS ITCH    Lisinopril Other (See Comments)     Throat tightness.     Other Itching     Itchy lips and mouth with fruits-watermelon, peaches,meloln, avocado     Family Status   Relation Name Status    Mother   in vision, no eye redness, no eye irritation, no double vision  Ears, nose, throat: no nasal congestion, no sore throat, no earache, no decrease in hearing, has hoarseness, no dry mouth, no sinus problems, no difficulty swallowing, no neck lumps, no dental problems, no mouth sores, no ringing in ears  Pulmonary: has shortness of breath, no wheezing, no dyspnea on exertion, has cough  Cardiovascular: has chest pain, has lower extremity edema, no orthopnea, no intermittent leg claudication, has palpitations  Gastrointestinal: no abdominal pain, no nausea, no vomiting, no diarrhea, no constipation, no heartburn, no bloating  Genitourinary: no dysuria, no urinary incontinence, no urinary hesitancy, no change in urinary frequency, no feelings of urinary urgency, no nocturia  Musculoskeletal: no joint swelling, no joint stiffness, has joint pain, no muscle cramps, no muscle pain, no bone pain  Integument/Breast: no hair loss, no skin rashes, no skin lesions, no itching, no dry skin  Neurological: has numbness, no tingling, no weakness, no confusion, no headaches, no dizziness, no fainting, no tremors, no decrease in memory, no balance problems  Psychiatric: no anxiety, no depression, no insomnia  Hematologic/Lymphatic: no tendency for easy bleeding, no swollen lymph nodes, no tendency for easy bruising  Immunology: no seasonal allergies, no frequent infections, no frequent illnesses  Endocrine: no temperature intolerance, has hot flashes, no hand tremor       /78   Pulse 70   Temp 98 °F (36.7 °C)   Resp 14   Ht 5' 3\" (1.6 m)   Wt 202 lb (91.6 kg)   SpO2 97%   BMI 35.78 kg/m²    Wt Readings from Last 3 Encounters:   12/02/21 202 lb (91.6 kg)   11/09/21 234 lb (106.1 kg)   06/23/21 231 lb 6.4 oz (105 kg)     Body mass index is 35.78 kg/m².     Physical Exam:  Constitutional: no acute distress, well appearing, well nourished  Psychiatric: oriented to person, place and time, judgement, insight and normal, recent and remote memory and intact and mood, affect are normal  Skin: skin and subcutaneous tissue is normal without mass, normal turgor  Head and Face: examination of head and face revealed no abnormalities  Eyes: no lid or conjunctival swelling, no erythema or discharge, pupils are normal, equal, round, and reactive to light  Ears/Nose: external inspection of ears and nose revealed no abnormalities, hearing is grossly normal  Oropharynx/Mouth/Face: lips, tongue and gums are normal with no lesions, the voice quality was normal  Neck: neck is supple and symmetric, with midline trachea and no masses, thyroid is enlarged  Lymphatics: normal cervical lymph nodes, normal supraclavicular nodes  Pulmonary: no increased work of breathing or signs of respiratory distress, lungs are clear to auscultation  Cardiovascular: normal heart rate and rhythm, normal S1 and S2, no murmurs and pedal pulses and 2+ bilaterally, 1+ edema  Abdomen: abdomen is soft, non-tender with no masses  Musculoskeletal: normal gait and station, exam of the digits and nails are normal  Neurological: normal coordination, normal general cortical function      Visual inspection:  Deformity/amputation: absent  Skin lesions/pre-ulcerative calluses: absent  Edema: right- negative, left- negative    Sensory exam:  Monofilament sensation: normal  (minimum of 5 random plantar locations tested, avoiding callused areas - > 1 area with absence of sensation is + for neuropathy)    Plus at least one of the following:  Pulses: normal  Proprioception: Intact  Vibration (128 Hz): Intact    ASSESSMENT/PLAN:   1. Diabetes Mellitus Type 2, controlled, without complications  Start Metformin 500 mg bid  Changed the diet  Lost 50 lbs. HbA1C 5.9-6.3-6.9-5.8-6.0-5.8  Glucose -107  - Hemoglobin A1C; Future     2. Obesity  Diet, exercise.     3. Acquired hypothyroidism  TSH 2.65-1.14-0.2-1.43-0.27  Continue levothyroxine 0.100 mg daily  - TSH without Reflex;  Future  - T4, Free; Future  - T3, Free; Future     4. Status post gastric banding  Diet, exercise.     5. Thyroid nodule  Right lobe, follow with US  Right 7 mm nodule. - US Head Neck Soft Tissue Thyroid; Future     6. Hyperlipidemia  LDL [id number]  No muscle pain. Atorvastatin 10 mg daily     7.  Elevated liver function tests  Follow  ALT 17-18  AST 18-35        Reviewed and/or ordered clinical lab results Yes  Reviewed and/or ordered radiology tests Yes   Reviewed and/or ordered other diagnostic tests No  Discussed test results with performing physician No  Independently reviewed image, tracing, or specimen No  Made a decision to obtain old records No  Reviewed and summarized old records Yes  Thyroid disease, on levothyroxine. Obtained history from other than patient No     Mare Brennan was counseled regarding symptoms of thyroid, diabetes diagnosis, course and complications of disease if inadequately treated, side effects of medications, diagnosis, treatment options, and prognosis, risks, benefits, complications, and alternatives of treatment, labs, imaging and other studies and treatment targets and goals, diabetes tests, complications, prevention, diet, exercise, thyroid nodule, monitoring. .  She understands instructions and counseling.     Total time I spent for this encounter 40 min    These diagnosis were discussed and reviewed with the patient including the advantages of drug therapy. She was counseled at this visit on the following: diabetes complication prevention and foot care. Return in about 6 months (around 6/2/2022) for diabetes.     Electronically signed by Yeimy Rodríguez MD on 12/2/2021 at 9:29 PM

## 2022-05-04 ENCOUNTER — TELEPHONE (OUTPATIENT)
Dept: FAMILY MEDICINE CLINIC | Age: 67
End: 2022-05-04

## 2022-05-04 DIAGNOSIS — I10 ESSENTIAL HYPERTENSION: Chronic | ICD-10-CM

## 2022-05-04 DIAGNOSIS — E78.2 MIXED HYPERLIPIDEMIA: Chronic | ICD-10-CM

## 2022-05-04 DIAGNOSIS — E11.9 CONTROLLED TYPE 2 DIABETES MELLITUS WITHOUT COMPLICATION, WITHOUT LONG-TERM CURRENT USE OF INSULIN (HCC): ICD-10-CM

## 2022-05-04 DIAGNOSIS — E03.9 ACQUIRED HYPOTHYROIDISM: Chronic | ICD-10-CM

## 2022-05-04 RX ORDER — ATORVASTATIN CALCIUM 20 MG/1
TABLET, FILM COATED ORAL
Qty: 90 TABLET | Refills: 0 | Status: SHIPPED | OUTPATIENT
Start: 2022-05-04 | End: 2022-05-11 | Stop reason: SDUPTHER

## 2022-05-04 RX ORDER — LOSARTAN POTASSIUM 50 MG/1
50 TABLET ORAL DAILY
Qty: 90 TABLET | Refills: 0 | Status: SHIPPED | OUTPATIENT
Start: 2022-05-04 | End: 2022-05-11 | Stop reason: SDUPTHER

## 2022-05-04 RX ORDER — LOSARTAN POTASSIUM 50 MG/1
50 TABLET ORAL DAILY
Qty: 90 TABLET | Refills: 1 | OUTPATIENT
Start: 2022-05-04

## 2022-05-04 RX ORDER — LEVOTHYROXINE SODIUM 0.1 MG/1
TABLET ORAL
Qty: 90 TABLET | Refills: 0 | Status: SHIPPED | OUTPATIENT
Start: 2022-05-04 | End: 2022-05-11 | Stop reason: SDUPTHER

## 2022-05-04 RX ORDER — ATORVASTATIN CALCIUM 20 MG/1
TABLET, FILM COATED ORAL
Qty: 90 TABLET | Refills: 1 | OUTPATIENT
Start: 2022-05-04

## 2022-05-04 RX ORDER — LEVOTHYROXINE SODIUM 0.1 MG/1
TABLET ORAL
Qty: 90 TABLET | Refills: 1 | OUTPATIENT
Start: 2022-05-04

## 2022-05-04 NOTE — TELEPHONE ENCOUNTER
Requested Prescriptions     Pending Prescriptions Disp Refills    levothyroxine (SYNTHROID) 100 MCG tablet 90 tablet 1     Sig: TAKE 1 TABLET BY MOUTH ONE TIME A DAY    atorvastatin (LIPITOR) 20 MG tablet 90 tablet 1     Sig: TAKE 1 TABLET BY MOUTH EVERY DAY    losartan (COZAAR) 50 MG tablet 90 tablet 1     Sig: Take 1 tablet by mouth daily       LOV 6/23/2021  Labs 11/8/21  No f/u

## 2022-05-04 NOTE — TELEPHONE ENCOUNTER
Medication name:levothyroxine (SYNTHROID) 100 MCG tablet       Medication dose:  Frequency:TAKE 1 TABLET BY MOUTH ONE TIME A DAY  Quantity:90 tablet  Pharmacy name:[address] [phone] [facility] [phone]   [address] [id number]   Phone:  [phone]  Fax:  [phone]  Pharmacy number:  Last OV:6/23/21  Last Labs:6/23/21  Medication name:atorvastatin (LIPITOR) 20 MG tablet       Medication dose:  Frequency:TAKE 1 TABLET BY MOUTH EVERY DAY  Quantity:90 tablets  Pharmacy name:[address] [place] [address]   Phone:  [phone]  Fax:  [phone]  Pharmacy number:  Last OV:6/23/21  Last Labs:  Medication name:losartan (COZAAR) 50 MG tablet       Medication dose:  Frequency:Take 1 tablet by mouth daily  Quantity:90 tablets  Pharmacy name:[address] [place] [address]   Phone:  [phone]  Fax:  [phone]  Pharmacy number:  Last OV:6/23/21  Last Labs:6/23/21

## 2022-05-11 ENCOUNTER — OFFICE VISIT (OUTPATIENT)
Dept: FAMILY MEDICINE CLINIC | Age: 67
End: 2022-05-11
Payer: COMMERCIAL

## 2022-05-11 VITALS
OXYGEN SATURATION: 97 % | DIASTOLIC BLOOD PRESSURE: 72 MMHG | TEMPERATURE: 97.9 F | HEART RATE: 70 BPM | SYSTOLIC BLOOD PRESSURE: 138 MMHG | RESPIRATION RATE: 14 BRPM | BODY MASS INDEX: 36.03 KG/M2 | WEIGHT: 203.4 LBS

## 2022-05-11 DIAGNOSIS — E11.9 CONTROLLED TYPE 2 DIABETES MELLITUS WITHOUT COMPLICATION, WITHOUT LONG-TERM CURRENT USE OF INSULIN (HCC): ICD-10-CM

## 2022-05-11 DIAGNOSIS — E78.2 MIXED HYPERLIPIDEMIA: Chronic | ICD-10-CM

## 2022-05-11 DIAGNOSIS — J30.2 SEASONAL ALLERGIC RHINITIS, UNSPECIFIED TRIGGER: Primary | Chronic | ICD-10-CM

## 2022-05-11 DIAGNOSIS — J45.909 MILD ASTHMA WITHOUT COMPLICATION, UNSPECIFIED WHETHER PERSISTENT: Chronic | ICD-10-CM

## 2022-05-11 DIAGNOSIS — I10 ESSENTIAL HYPERTENSION: Chronic | ICD-10-CM

## 2022-05-11 DIAGNOSIS — E03.9 ACQUIRED HYPOTHYROIDISM: Chronic | ICD-10-CM

## 2022-05-11 DIAGNOSIS — J30.2 SEASONAL ALLERGIC RHINITIS, UNSPECIFIED TRIGGER: Chronic | ICD-10-CM

## 2022-05-11 PROCEDURE — 99214 OFFICE O/P EST MOD 30 MIN: CPT | Performed by: FAMILY MEDICINE

## 2022-05-11 RX ORDER — LEVOTHYROXINE SODIUM 0.1 MG/1
TABLET ORAL
Qty: 90 TABLET | Refills: 0 | Status: SHIPPED | OUTPATIENT
Start: 2022-05-11 | End: 2022-10-20 | Stop reason: SDUPTHER

## 2022-05-11 RX ORDER — LOSARTAN POTASSIUM 50 MG/1
50 TABLET ORAL DAILY
Qty: 90 TABLET | Refills: 0 | Status: SHIPPED | OUTPATIENT
Start: 2022-05-11 | End: 2022-10-20 | Stop reason: SDUPTHER

## 2022-05-11 RX ORDER — MONTELUKAST SODIUM 10 MG/1
10 TABLET ORAL DAILY
Qty: 30 TABLET | Refills: 2 | Status: SHIPPED | OUTPATIENT
Start: 2022-05-11 | End: 2022-05-11

## 2022-05-11 RX ORDER — MONTELUKAST SODIUM 10 MG/1
10 TABLET ORAL DAILY
Qty: 90 TABLET | Refills: 1 | Status: SHIPPED | OUTPATIENT
Start: 2022-05-11

## 2022-05-11 RX ORDER — ATORVASTATIN CALCIUM 20 MG/1
TABLET, FILM COATED ORAL
Qty: 90 TABLET | Refills: 0 | Status: SHIPPED | OUTPATIENT
Start: 2022-05-11 | End: 2022-10-20 | Stop reason: SDUPTHER

## 2022-05-11 NOTE — PROGRESS NOTES
Patient is here for  Right ear buzzing sound X 3 weeks. No hearing difficulties. Some nasal congestion or post nasal drip with h/o allergies. Clear nasal discharge. Taking Allegra and helps some, but not enough . Sleeping okay usually - 6-7 hours/ night . Watery , scratchy eyes. Sneezing. No vertigo or dizziness. Scratchy throat. Her Blood sugars are . She has lost weight. - 30 lbs. Review of Systems    ROS: All other systems were reviewed and are negative . Patient's allergies and medications were reviewed. Patient's past medical, surgical, social , and family history were reviewed. Wt Readings from Last 3 Encounters:   05/11/22 203 lb 6.4 oz (92.3 kg)   12/02/21 202 lb (91.6 kg)   11/09/21 234 lb (106.1 kg)       OBJECTIVE:  /72   Pulse 70   Temp 97.9 °F (36.6 °C) (Temporal)   Resp 14   Wt 203 lb 6.4 oz (92.3 kg)   SpO2 97%   BMI 36.03 kg/m²     Physical Exam    General: NAD, cooperative, alert and oriented X 3. Mood / affect is good. good insight. well hydrated. HEENT: PERRLA, EOMI, TMs - clear. Nasopharynx clear. Neck : no lymphadenopathy, supple, FROM  CV: Regular rate and rhythm , no murmurs/ rub/ gallop. No edema. Lungs : CTA bilaterally, breathing comfortably  Abdomen: positive bowel sounds, soft , non tender, non distended. No hepatosplenomegaly. No CVA tenderness. Skin: no rashes. Non tender. ASSESSMENT/  PLAN:  1. Mixed hyperlipidemia  - Controlled. Continue Lipitor 20 mg qd and low cholesterol diet. - atorvastatin (LIPITOR) 20 MG tablet; TAKE 1 TABLET BY MOUTH EVERY DAY  Dispense: 90 tablet; Refill: 0    2. Acquired hypothyroidism  - Stable. Continue Synthroid 0.100 mg qd. - levothyroxine (SYNTHROID) 100 MCG tablet; TAKE 1 TABLET BY MOUTH ONE TIME A DAY  Dispense: 90 tablet; Refill: 0    3. Essential hypertension  - Controlled. Continue Losartan 50 mg qd and low sodium diet. - losartan (COZAAR) 50 MG tablet;  Take 1 tablet by mouth daily FOLLOW UP APPOINTMENT AND LABS ARE NEEDED. Dispense: 90 tablet; Refill: 0    4. Seasonal allergic rhinitis, unspecified trigger  - Trial of Singulair 10 mg qd , Flonase NS qhs and Zyrtec or Xyzal qd prn.     5. Mild asthma without complication, unspecified whether persistent  - Stable. Continue Singulair 10 mg qd and Albuterol HFA prn.    6. Controlled type 2 diabetes mellitus without complication, without long-term current use of insulin ([facility] [address]  - overdue for labs. - continue Metformin and dietary/ lifestyle modifications. Follow up 3 months/ prn.

## 2022-07-28 DIAGNOSIS — E04.1 THYROID NODULE: ICD-10-CM

## 2022-07-28 DIAGNOSIS — R79.89 ELEVATED LIVER FUNCTION TESTS: Chronic | ICD-10-CM

## 2022-07-28 DIAGNOSIS — E03.9 ACQUIRED HYPOTHYROIDISM: Chronic | ICD-10-CM

## 2022-07-28 DIAGNOSIS — I10 ESSENTIAL HYPERTENSION: Chronic | ICD-10-CM

## 2022-07-28 DIAGNOSIS — E78.2 MIXED HYPERLIPIDEMIA: Chronic | ICD-10-CM

## 2022-07-28 LAB
A/G RATIO: 0.7 (ref 1.1–2.2)
ALBUMIN SERPL-MCNC: 3.6 G/DL (ref 3.4–5)
ALP BLD-CCNC: 109 U/L (ref 40–129)
ALT SERPL-CCNC: 13 U/L (ref 10–40)
ANION GAP SERPL CALCULATED.3IONS-SCNC: 7 MMOL/L (ref 3–16)
AST SERPL-CCNC: 16 U/L (ref 15–37)
BILIRUB SERPL-MCNC: 0.3 MG/DL (ref 0–1)
BUN BLDV-MCNC: 12 MG/DL (ref 7–20)
CALCIUM SERPL-MCNC: 10.1 MG/DL (ref 8.3–10.6)
CHLORIDE BLD-SCNC: 104 MMOL/L (ref 99–110)
CHOLESTEROL, TOTAL: 143 MG/DL (ref 0–199)
CO2: 26 MMOL/L (ref 21–32)
CREAT SERPL-MCNC: <0.5 MG/DL (ref 0.6–1.2)
CREATININE URINE: 71.6 MG/DL (ref 28–259)
GFR AFRICAN AMERICAN: >60
GFR NON-AFRICAN AMERICAN: >60
GLUCOSE BLD-MCNC: 97 MG/DL (ref 70–99)
HDLC SERPL-MCNC: 43 MG/DL (ref 40–60)
LDL CHOLESTEROL CALCULATED: 81 MG/DL
MICROALBUMIN UR-MCNC: 1.6 MG/DL
MICROALBUMIN/CREAT UR-RTO: 22.3 MG/G (ref 0–30)
POTASSIUM SERPL-SCNC: 4.3 MMOL/L (ref 3.5–5.1)
SODIUM BLD-SCNC: 137 MMOL/L (ref 136–145)
T4 FREE: 1.3 NG/DL (ref 0.9–1.8)
TOTAL PROTEIN: 8.8 G/DL (ref 6.4–8.2)
TRIGL SERPL-MCNC: 96 MG/DL (ref 0–150)
TSH SERPL DL<=0.05 MIU/L-ACNC: 0.78 UIU/ML (ref 0.27–4.2)
VLDLC SERPL CALC-MCNC: 19 MG/DL

## 2022-07-29 LAB
ESTIMATED AVERAGE GLUCOSE: 122.6 MG/DL
HBA1C MFR BLD: 5.9 %
T3 FREE: 2.9 PG/ML (ref 2.3–4.2)
VITAMIN D 25-HYDROXY: 31.3 NG/ML

## 2022-08-09 ENCOUNTER — OFFICE VISIT (OUTPATIENT)
Dept: ENDOCRINOLOGY | Age: 67
End: 2022-08-09
Payer: COMMERCIAL

## 2022-08-09 VITALS
WEIGHT: 209 LBS | OXYGEN SATURATION: 99 % | SYSTOLIC BLOOD PRESSURE: 126 MMHG | HEART RATE: 75 BPM | TEMPERATURE: 98 F | BODY MASS INDEX: 37.03 KG/M2 | HEIGHT: 63 IN | RESPIRATION RATE: 14 BRPM | DIASTOLIC BLOOD PRESSURE: 76 MMHG

## 2022-08-09 DIAGNOSIS — R79.89 ELEVATED LIVER FUNCTION TESTS: ICD-10-CM

## 2022-08-09 DIAGNOSIS — E55.9 VITAMIN D DEFICIENCY: ICD-10-CM

## 2022-08-09 DIAGNOSIS — E03.9 ACQUIRED HYPOTHYROIDISM: ICD-10-CM

## 2022-08-09 DIAGNOSIS — E04.1 THYROID NODULE: ICD-10-CM

## 2022-08-09 DIAGNOSIS — E78.2 MIXED HYPERLIPIDEMIA: ICD-10-CM

## 2022-08-09 DIAGNOSIS — E66.01 CLASS 2 SEVERE OBESITY WITH SERIOUS COMORBIDITY AND BODY MASS INDEX (BMI) OF 35.0 TO 35.9 IN ADULT, UNSPECIFIED OBESITY TYPE (HCC): ICD-10-CM

## 2022-08-09 DIAGNOSIS — E78.5 HYPERLIPIDEMIA, UNSPECIFIED HYPERLIPIDEMIA TYPE: ICD-10-CM

## 2022-08-09 DIAGNOSIS — E11.9 CONTROLLED TYPE 2 DIABETES MELLITUS WITHOUT COMPLICATION, WITHOUT LONG-TERM CURRENT USE OF INSULIN (HCC): Primary | ICD-10-CM

## 2022-08-09 PROCEDURE — 99214 OFFICE O/P EST MOD 30 MIN: CPT | Performed by: INTERNAL MEDICINE

## 2022-08-09 PROCEDURE — 3044F HG A1C LEVEL LT 7.0%: CPT | Performed by: INTERNAL MEDICINE

## 2022-08-09 NOTE — PROGRESS NOTES
James Sumner is a 61 y.o. female who presents for Type 2 diabetes mellitus. Current symptoms/problems include  no symptoms  and show no change. 1.  Controlled type 2 diabetes mellitus without complication, without long-term current use of insulin ([facility] [address] [E11.9]    Diagnosed with Type 2 diabetes mellitus in 2019. Comorbid conditions:  hyperlipidemia    Current diabetic medications include: none    Intolerance to diabetes medications: No     Weight trend: stable  Prior visit with dietician: yes  Current diet: on average, 3 meals per day  Current exercise: walks     Current monitoring regimen: home blood tests - 1 times daily  Has brought blood glucose log/meter:  Yes  Home blood sugar records: fasting range: 70-80 and postprandial range:    Any episodes of hypoglycemia? No  Hypoglycemia frequency and time(s):    Does patient have Glucagon emergency kit? No  Does patient have rapid acting carbohydrate? No  Does patient wear a medic alert bracelet or necklace? No      2. Acquired hypothyroidism  This started in 2017. Patient was diagnosed with hypothyroidism. The problem has been gradually worsening. Patient started medication in 2017. Currently patient is on: levothyroxine. Misses  0 doses a month. Current complaints: constipation. 3. Thyroid nodule  Right 7 mm nodule. No difficulty swallowing  History of obstructive symptoms: difficulty swallowing Yes, changes in voice/hoarseness Yes. 4. Status post gastric banding  Had lap banding, lost 100 lbs, then started gaining again. Now is watching her diet, lost weight. 5. Obesity  Fluctuating weight. Eats healthy. Has neuroma, asthma. Difficult to exercise.  for [employer]. 6. Elevated liver function tests  No nausea, vomiting. 7. Essential hypertension  No headaches    8.  Vitamin D deficiency  No fatigue    Past Medical History:   Diagnosis Date    Asthma     Elevated liver function tests 10/21/2016    Essential hypertension 12/12/2016    GERD (gastroesophageal reflux disease)     History of cystocele 9/12/2016    Hyperglycemia 10/21/2016    Hyperlipidemia     Hypertension     Hypothyroidism 10/21/2016    Migraine     Morbid obesity due to excess calories ([address] 10/21/2016    Reina's neuroma of left foot 10/21/2016    Non morbid obesity due to excess calories 9/12/2016    Obesity     Obstructive sleep apnea 10/21/2016    Osteoarthritis     Prediabetes 10/21/2016    Rectocele 9/12/2016    Seasonal allergic rhinitis 12/12/2016    Snoring 10/21/2016    Status post gastric banding 4/26/2010    Uterovaginal prolapse 9/12/2016      Patient Active Problem List   Diagnosis    Arthritis    Asthma    Hyperlipidemia    Status post gastric banding    Non morbid obesity due to excess calories    History of cystocele    Rectocele    Uterovaginal prolapse    Hypothyroidism    Hyperglycemia    Diabetes mellitus type 2, controlled, without complications ([address]    Elevated liver function tests    Reina's neuroma of left foot    Snoring    Obstructive sleep apnea    Morbid obesity due to excess calories (HCC)    Seasonal allergic rhinitis    Essential hypertension    Pain in right upper arm    Numbness and tingling of both upper extremities while sleeping    Arthritis of right acromioclavicular joint    Pain radiating to neck    Biceps tendinitis on right    Bursitis of right shoulder    Tendinitis of right rotator cuff    Chronic right shoulder pain    Class 2 obesity with body mass index (BMI) of 39.0 to 39.9 in adult    Thyroid nodule    Class 2 obesity in adult    Controlled type 2 diabetes mellitus without complication, without long-term current use of insulin ([address]    Vitamin D deficiency     Past Surgical History:   Procedure Laterality Date    ABDOMINOPLASTY  1996    [place], [address]  09/21/2012    Dr. Jojo Santacruz, [address] CHOLECYSTECTOMY      COLONOSCOPY  12/04/2013    Dr. Damaris Orellana.  [place]    CYSTOSCOPY  09/21/2012    Dr. Godwin Santizo Left 10/25/2016    Dr. Edward Che Left 01/2020    HYSTERECTOMY, VAGINAL  09/21/2012    Dr. Linda Jackson    LAP BAND  01/15/2010    Dr. Brigette Lilly Left 07/26/2010    Dr. Edgar Myles - incisional biopsy of left tonsillar mass    [address], [address] GASTROINTESTINAL ENDOSCOPY  01/23/2015    Dr. Ludwig Job  10/16/2010    Dr. John Bosch    work related injury, removed a bone     Social History     Socioeconomic History    Marital status:      Spouse name: Wanda Donis    Number of children: 6    Years of education: Not on file    Highest education level: Not on file   Occupational History    Occupation:  and cleans houses     Comment: as of September 12, 2016   Tobacco Use    Smoking status: Never    Smokeless tobacco: Never   Vaping Use    Vaping Use: Never used   Substance and Sexual Activity    Alcohol use: No    Drug use: No    Sexual activity: Yes     Partners: Male     Comment:  - [facility] [place]   Other Topics Concern    Not on file   Social History Narrative    Not on file     Social Determinants of Health     Financial Resource Strain: Not on file   Food Insecurity: Not on file   Transportation Needs: Not on file   Physical Activity: Not on file   Stress: Not on file   Social Connections: Not on file   Intimate Partner Violence: Not on file   Housing Stability: Not on file     Family History   Problem Relation Age of Onset    Cancer Mother     Diabetes Father     High Blood Pressure Father     High Cholesterol Father     Heart Disease Father     Asthma Other     Uterine Cancer Other     Heart Disease Other blood glucose. Dispense sufficient amount . 100 each 5    blood glucose monitor strips Test 1 times a day & as needed for symptoms of fluctuating blood glucose. Dispense sufficient amount . 100 strip 5    ONETOUCH VERIO strip 1 each by In Vitro route 2 times daily 100 each 3    Blood Glucose Monitoring Suppl (ONETOUCH VERIO FLEX SYSTEM) w/Device KIT 1 Device by Does not apply route once for 1 dose 1 kit 0    ONETOUCH DELICA LANCETS 81D MISC 1 each by Does not apply route 2 times daily 60 each 5    fexofenadine (ALLEGRA) 180 MG tablet Take 1 tablet by mouth daily as needed (for allergies)       No current facility-administered medications for this visit. Allergies   Allergen Reactions    Latex Rash    Fruit & Vegetable Daily [Nutritional Supplements] Other (See Comments)     Vomiting, onion, tomato, watermelon, peach    Kiwi Extract      LIPS ITCH    Lisinopril Other (See Comments)     Throat tightness.     Other Itching     Itchy lips and mouth with fruits-watermelon, peaches,meloln, avocado     Family Status   Relation Name Status    Mother      Father      Other  (Not Specified)    Montse Dalia Alive    Montse Danish Gale Alive    Montse Andrea Hassan    Montse Santy Alive    Montse Toshia Meals    Sister      Brother  Alive    Sister  Alive    Sister  Alive    Brother      Brother  Alive    Brother  Alive    Brother  Alive    Brother  Alive    Brother  Alive    Brother  Alive    Son Ferd Prom       Lab Review:    Lab Results   Component Value Date/Time    WBC 6.4 06/15/2017 09:02 AM    HGB 12.4 06/15/2017 09:02 AM    HCT 38.4 06/15/2017 09:02 AM    MCV 91.7 06/15/2017 09:02 AM     06/15/2017 09:02 AM     Lab Results   Component Value Date/Time     2022 08:37 AM    K 4.3 2022 08:37 AM     2022 08:37 AM    CO2 26 2022 08:37 AM    BUN 12 2022 08:37 AM    CREATININE <0.5 2022 08:37 AM    GLUCOSE 97 2022 08:37 AM    CALCIUM 10.1 2022 palpitations  Gastrointestinal: no abdominal pain, no nausea, no vomiting, no diarrhea, no constipation, no heartburn, no bloating  Genitourinary: no dysuria, no urinary incontinence, no urinary hesitancy, no change in urinary frequency, no feelings of urinary urgency, no nocturia  Musculoskeletal: no joint swelling, no joint stiffness, has joint pain, no muscle cramps, no muscle pain, no bone pain  Integument/Breast: no hair loss, no skin rashes, no skin lesions, no itching, no dry skin  Neurological: has numbness, no tingling, no weakness, no confusion, no headaches, no dizziness, no fainting, no tremors, no decrease in memory, no balance problems  Psychiatric: no anxiety, no depression, no insomnia  Hematologic/Lymphatic: no tendency for easy bleeding, no swollen lymph nodes, no tendency for easy bruising  Immunology: no seasonal allergies, no frequent infections, no frequent illnesses  Endocrine: no temperature intolerance, has hot flashes, no hand tremor       /76   Pulse 75   Temp 98 °F (36.7 °C)   Resp 14   Ht 5' 3\" (1.6 m)   Wt 209 lb (94.8 kg)   SpO2 99%   BMI 37.02 kg/m²    Wt Readings from Last 3 Encounters:   08/09/22 209 lb (94.8 kg)   05/11/22 203 lb 6.4 oz (92.3 kg)   12/02/21 202 lb (91.6 kg)     Body mass index is 37.02 kg/m².     Physical Exam:  Constitutional: no acute distress, well appearing, well nourished  Psychiatric: oriented to person, place and time, judgement, insight and normal, recent and remote memory and intact and mood, affect are normal  Skin: skin and subcutaneous tissue is normal without mass, normal turgor  Head and Face: examination of head and face revealed no abnormalities  Eyes: no lid or conjunctival swelling, no erythema or discharge, pupils are normal, equal, round, and reactive to light  Ears/Nose: external inspection of ears and nose revealed no abnormalities, hearing is grossly normal  Oropharynx/Mouth/Face: lips, tongue and gums are normal with no lesions, ordered clinical lab results Yes  Reviewed and/or ordered radiology tests Yes   Reviewed and/or ordered other diagnostic tests No  Discussed test results with performing physician No  Independently reviewed image, tracing, or specimen No  Made a decision to obtain old records No  Reviewed and summarized old records Yes  Thyroid disease, on levothyroxine. Obtained history from other than patient No     Jacinta August was counseled regarding symptoms of thyroid, diabetes diagnosis, course and complications of disease if inadequately treated, side effects of medications, diagnosis, treatment options, and prognosis, risks, benefits, complications, and alternatives of treatment, labs, imaging and other studies and treatment targets and goals, diabetes tests, complications, prevention, diet, exercise, thyroid nodule, monitoring. .  She understands instructions and counseling. These diagnosis were discussed and reviewed with the patient including the advantages of drug therapy. She was counseled at this visit on the following: diabetes complication prevention and foot care. Return in about 6 months (around 2/9/2023) for diabetes.     Electronically signed by Jimi Ramírez MD on 8/9/2022 at 3:10 PM

## 2022-10-20 ENCOUNTER — OFFICE VISIT (OUTPATIENT)
Dept: FAMILY MEDICINE CLINIC | Age: 67
End: 2022-10-20
Payer: COMMERCIAL

## 2022-10-20 VITALS
OXYGEN SATURATION: 97 % | SYSTOLIC BLOOD PRESSURE: 138 MMHG | BODY MASS INDEX: 37.48 KG/M2 | RESPIRATION RATE: 12 BRPM | HEART RATE: 73 BPM | TEMPERATURE: 96.8 F | WEIGHT: 211.6 LBS | DIASTOLIC BLOOD PRESSURE: 80 MMHG

## 2022-10-20 DIAGNOSIS — R13.10 DYSPHAGIA, UNSPECIFIED TYPE: ICD-10-CM

## 2022-10-20 DIAGNOSIS — J01.00 ACUTE MAXILLARY SINUSITIS, RECURRENCE NOT SPECIFIED: ICD-10-CM

## 2022-10-20 DIAGNOSIS — R14.2 BELCHING: ICD-10-CM

## 2022-10-20 DIAGNOSIS — I10 ESSENTIAL HYPERTENSION: Chronic | ICD-10-CM

## 2022-10-20 DIAGNOSIS — H53.9 DIFFICULTY READING DUE TO VISUAL PROBLEM: ICD-10-CM

## 2022-10-20 DIAGNOSIS — R51.9 NONINTRACTABLE HEADACHE, UNSPECIFIED CHRONICITY PATTERN, UNSPECIFIED HEADACHE TYPE: Primary | ICD-10-CM

## 2022-10-20 DIAGNOSIS — E03.9 ACQUIRED HYPOTHYROIDISM: Chronic | ICD-10-CM

## 2022-10-20 DIAGNOSIS — K59.00 CONSTIPATION, UNSPECIFIED CONSTIPATION TYPE: ICD-10-CM

## 2022-10-20 DIAGNOSIS — J30.9 ALLERGIC RHINITIS, UNSPECIFIED SEASONALITY, UNSPECIFIED TRIGGER: ICD-10-CM

## 2022-10-20 PROCEDURE — 99214 OFFICE O/P EST MOD 30 MIN: CPT | Performed by: FAMILY MEDICINE

## 2022-10-20 RX ORDER — HYDROCHLOROTHIAZIDE 25 MG/1
25 TABLET ORAL EVERY MORNING
Qty: 90 TABLET | Refills: 1 | Status: SHIPPED | OUTPATIENT
Start: 2022-10-20

## 2022-10-20 RX ORDER — AMOXICILLIN AND CLAVULANATE POTASSIUM 875; 125 MG/1; MG/1
1 TABLET, FILM COATED ORAL 2 TIMES DAILY
Qty: 20 TABLET | Refills: 0 | Status: SHIPPED | OUTPATIENT
Start: 2022-10-20 | End: 2022-10-30

## 2022-10-20 RX ORDER — ATORVASTATIN CALCIUM 20 MG/1
TABLET, FILM COATED ORAL
Qty: 90 TABLET | Refills: 1 | Status: SHIPPED | OUTPATIENT
Start: 2022-10-20

## 2022-10-20 RX ORDER — LOSARTAN POTASSIUM 50 MG/1
50 TABLET ORAL DAILY
Qty: 90 TABLET | Refills: 1 | Status: SHIPPED | OUTPATIENT
Start: 2022-10-20

## 2022-10-20 RX ORDER — LEVOTHYROXINE SODIUM 0.1 MG/1
TABLET ORAL
Qty: 90 TABLET | Refills: 1 | Status: SHIPPED | OUTPATIENT
Start: 2022-10-20

## 2022-10-20 RX ORDER — OMEPRAZOLE 40 MG/1
CAPSULE, DELAYED RELEASE ORAL
Qty: 90 CAPSULE | OUTPATIENT
Start: 2022-10-20

## 2022-10-20 RX ORDER — OMEPRAZOLE 40 MG/1
40 CAPSULE, DELAYED RELEASE ORAL
Qty: 30 CAPSULE | Refills: 1 | Status: SHIPPED | OUTPATIENT
Start: 2022-10-20

## 2022-10-20 SDOH — ECONOMIC STABILITY: TRANSPORTATION INSECURITY
IN THE PAST 12 MONTHS, HAS THE LACK OF TRANSPORTATION KEPT YOU FROM MEDICAL APPOINTMENTS OR FROM GETTING MEDICATIONS?: NO

## 2022-10-20 SDOH — ECONOMIC STABILITY: FOOD INSECURITY: WITHIN THE PAST 12 MONTHS, YOU WORRIED THAT YOUR FOOD WOULD RUN OUT BEFORE YOU GOT MONEY TO BUY MORE.: NEVER TRUE

## 2022-10-20 SDOH — ECONOMIC STABILITY: FOOD INSECURITY: WITHIN THE PAST 12 MONTHS, THE FOOD YOU BOUGHT JUST DIDN'T LAST AND YOU DIDN'T HAVE MONEY TO GET MORE.: NEVER TRUE

## 2022-10-20 SDOH — ECONOMIC STABILITY: TRANSPORTATION INSECURITY
IN THE PAST 12 MONTHS, HAS LACK OF TRANSPORTATION KEPT YOU FROM MEETINGS, WORK, OR FROM GETTING THINGS NEEDED FOR DAILY LIVING?: NO

## 2022-10-20 ASSESSMENT — SOCIAL DETERMINANTS OF HEALTH (SDOH): HOW HARD IS IT FOR YOU TO PAY FOR THE VERY BASICS LIKE FOOD, HOUSING, MEDICAL CARE, AND HEATING?: NOT HARD AT ALL

## 2022-10-20 NOTE — PROGRESS NOTES
Patient is here for daily headaches X 3 weeks but increased since Monday . Her headaches awaken her in the middle of the night almost nightly since Monday . Took Tylenol this am and helps. Her headache is 5/10 . Her headache this am was 9/10 prior to taking Tylenol. Her headaches are posterior to frontal.  No blurred vision except with reading. When uses reading glasses her headaches get worse- non prescription . Last eye exam > 1 year ago. Some blood with blowing her nose- right sided. No nasal congestion or post nasal drip . No clearing of throat or sore throat . A lot of watery , scratchy eyes. Occasional sneezing. , but more with cat or perfumes. No ear pressure / popping. No fever. Her blood pressure has been fine when she has checked it at home. 14 years ago she got stomach banding for weight loss. Some belching in the am especially . No heartburn/ indigestion . Some nausea. No emesis. Some dysphagia if eats fatty foods and dry foods. Finds she gets more nauseated if not eating or drinking something. 1 coffee/ day . No alcohol. Taking Matheus - fiber once per day . Her bowel movement are once per day but some straining. Review of Systems    ROS: All other systems were reviewed and are negative . Patient's allergies and medications were reviewed. Patient's past medical, surgical, social , and family history were reviewed. OBJECTIVE:  /80 (Site: Right Upper Arm, Position: Sitting, Cuff Size: Large Adult)   Pulse 73   Temp 96.8 °F (36 °C) (Temporal)   Resp 12   Wt 211 lb 9.6 oz (96 kg)   SpO2 97%   BMI 37.48 kg/m²     Physical Exam    General: NAD, cooperative, alert and oriented X 3. Mood / affect is good. good insight. well hydrated. HEENT: PERRLA, EOMI, TMs - clear. Nasopharynx clear. Maxillary tenderness left > right. No frontal or temple tenderness. Neck : no lymphadenopathy, supple, FROM  CV: Regular rate and rhythm , no murmurs/ rub/ gallop.  No edema.   Lungs : CTA bilaterally, breathing comfortably  Abdomen: positive bowel sounds, soft , non tender, non distended. No hepatosplenomegaly. No CVA tenderness. Skin: no rashes. Non tender. ASSESSMENT/  PLAN:  1. Nonintractable headache, unspecified chronicity pattern, unspecified headache type  - Likely multi-factorial - sinusitis, allergies and vision contributing.   - Recommended eye exam given reading difficulties. 2. Acute maxillary sinusitis, recurrence not specified  - Push fluids - water. Netti pot prn.   - amoxicillin-clavulanate (AUGMENTIN) 875-125 MG per tablet; Take 1 tablet by mouth 2 times daily for 10 days  Dispense: 20 tablet; Refill: 0    3. Difficulty reading due to visual problem  - Recommended eye exam.     4. Allergic rhinitis, unspecified seasonality, unspecified trigger  - Recommended Flonase NS qhs , Zyrtec or Xyzal qd prn.   - Discussed adding Pataday eye drops prn.     5. Belching  - Start Prilosec 40 mg qd and recommended dietary/ lifestyle modifications. - Referral - Jorgito Lizarraga MD, Gastroenterology, [place]  - omeprazole (PRILOSEC) 40 MG delayed release capsule; Take 1 capsule by mouth every morning (before breakfast)  Dispense: 30 capsule; Refill: 1    6. Dysphagia, unspecified type  - Start Prilosec 40 mg qd and recommended dietary/ lifestyle modifications. - Jorgito Lizarraga MD, Gastroenterology, [place]  - omeprazole (PRILOSEC) 40 MG delayed release capsule; Take 1 capsule by mouth every morning (before breakfast)  Dispense: 30 capsule; Refill: 1    7. Constipation, unspecified constipation type  - Add Miralax qd to qod. - Continue to push fluids -water and daily dietary fiber. Continue Matheus fiber once per day. 8. Acquired hypothyroidism  - Stable. Continue Synthroid 0.100 mg qd. - levothyroxine (SYNTHROID) 100 MCG tablet; TAKE 1 TABLET BY MOUTH ONE TIME A DAY  Dispense: 90 tablet; Refill: 1    9.  Essential hypertension  - Controlled. Continue Losartan 50 mg qd, HCTZ 25 mg qd and low sodium diet. - losartan (COZAAR) 50 MG tablet; Take 1 tablet by mouth daily  Dispense: 90 tablet; Refill: 1  - hydroCHLOROthiazide (HYDRODIURIL) 25 MG tablet; Take 1 tablet by mouth every morning  Dispense: 90 tablet; Refill: 1     F/u if no improvement 7-10d/ prn increased symptoms/ otherwise follow up in 4-6 weeks.

## 2022-11-07 ENCOUNTER — OFFICE VISIT (OUTPATIENT)
Dept: FAMILY MEDICINE CLINIC | Age: 67
End: 2022-11-07
Payer: COMMERCIAL

## 2022-11-07 VITALS
WEIGHT: 214.6 LBS | OXYGEN SATURATION: 97 % | BODY MASS INDEX: 38.01 KG/M2 | DIASTOLIC BLOOD PRESSURE: 70 MMHG | HEART RATE: 71 BPM | SYSTOLIC BLOOD PRESSURE: 110 MMHG

## 2022-11-07 DIAGNOSIS — R30.0 DYSURIA: Primary | ICD-10-CM

## 2022-11-07 DIAGNOSIS — R77.8 ELEVATED TOTAL PROTEIN: ICD-10-CM

## 2022-11-07 DIAGNOSIS — E11.9 CONTROLLED TYPE 2 DIABETES MELLITUS WITHOUT COMPLICATION, WITHOUT LONG-TERM CURRENT USE OF INSULIN (HCC): ICD-10-CM

## 2022-11-07 DIAGNOSIS — E03.9 ACQUIRED HYPOTHYROIDISM: Chronic | ICD-10-CM

## 2022-11-07 PROCEDURE — 3044F HG A1C LEVEL LT 7.0%: CPT | Performed by: FAMILY MEDICINE

## 2022-11-07 PROCEDURE — 3078F DIAST BP <80 MM HG: CPT | Performed by: FAMILY MEDICINE

## 2022-11-07 PROCEDURE — 99214 OFFICE O/P EST MOD 30 MIN: CPT | Performed by: FAMILY MEDICINE

## 2022-11-07 PROCEDURE — 3074F SYST BP LT 130 MM HG: CPT | Performed by: FAMILY MEDICINE

## 2022-11-07 RX ORDER — NITROFURANTOIN 25; 75 MG/1; MG/1
100 CAPSULE ORAL 2 TIMES DAILY
Qty: 14 CAPSULE | Refills: 0 | Status: SHIPPED | OUTPATIENT
Start: 2022-11-07 | End: 2022-11-14

## 2022-11-07 RX ORDER — CEPHALEXIN 500 MG/1
500 CAPSULE ORAL 4 TIMES DAILY
COMMUNITY

## 2022-11-07 NOTE — PROGRESS NOTES
Patient is here for started with some back pain on Thursday . She night she struggled to urinate , but then notice blood in urine. She did an Azo test strip this am and showed positive nitrites - last night and this am.  She noticed blood in urine on Saturday night and lighter on Sunday , but not today . Some lower abdominal \l pain . Low grade fever on Saturday night . Tm = 100.2 . No fever since. Some constipation. No nausea, vomiting, diarrhea . Slight headaches. She went to Urgent Care in [place] on Saturday night and was prescribed Keflex 500 qid X 10 days. She has taken about 7-8 pills and only has helped slightly . [facility] [place]. [phone]    Review of Systems    ROS: All other systems were reviewed and are negative . Patient's allergies and medications were reviewed. Patient's past medical, surgical, social , and family history were reviewed. No results found for this visit on 11/07/22. .   OBJECTIVE:  /70 (Site: Left Upper Arm, Position: Sitting, Cuff Size: Large Adult)   Pulse 71   Wt 214 lb 9.6 oz (97.3 kg)   SpO2 97%   BMI 38.01 kg/m²     Physical Exam    General: NAD, cooperative, alert and oriented X 3. Mood / affect is good. good insight. well hydrated. Neck : no lymphadenopathy, supple, FROM  CV: Regular rate and rhythm , no murmurs/ rub/ gallop. No edema. Lungs : CTA bilaterally, breathing comfortably  Abdomen: positive bowel sounds, soft , mild suprapubic tenderness. No rebound / guarding, non distended. No hepatosplenomegaly. No CVA tenderness. Skin: no rashes. Non tender. ASSESSMENT/  PLAN:  1. Dysuria  - Push fluids - water.    - Culture, Urine  - Treat empirically with Nitrofurantoin, macrocrystal-monohydrate, (MACROBID) 100 MG capsule; Take 1 capsule by mouth 2 times daily for 7 days  Dispense: 14 capsule; Refill: 0   - Unable to obtain urine culture from Urgent Care.     2. Controlled type 2 diabetes mellitus without complication, without long-term current use of insulin (HCC)  - HgbA1c= 5.9 in 7/22.   - Continue dietary/ lifestyle modifications, including decreased concentrated sweets and carbohydrates. 3. Elevated total protein  - complete metabolic panel if still elevated will check SPEP and consider hematology referral.     4. Acquired hypothyroidism  - Stable. F/u if no improvement 7d/ prn increased symptoms.

## 2022-11-08 DIAGNOSIS — E55.9 VITAMIN D DEFICIENCY: ICD-10-CM

## 2022-11-08 DIAGNOSIS — E78.5 HYPERLIPIDEMIA, UNSPECIFIED HYPERLIPIDEMIA TYPE: ICD-10-CM

## 2022-11-08 DIAGNOSIS — E03.9 ACQUIRED HYPOTHYROIDISM: ICD-10-CM

## 2022-11-08 DIAGNOSIS — E78.2 MIXED HYPERLIPIDEMIA: ICD-10-CM

## 2022-11-08 DIAGNOSIS — E04.1 THYROID NODULE: ICD-10-CM

## 2022-11-08 DIAGNOSIS — E11.9 CONTROLLED TYPE 2 DIABETES MELLITUS WITHOUT COMPLICATION, WITHOUT LONG-TERM CURRENT USE OF INSULIN (HCC): ICD-10-CM

## 2022-11-08 LAB
A/G RATIO: 0.7 (ref 1.1–2.2)
ALBUMIN SERPL-MCNC: 3.8 G/DL (ref 3.4–5)
ALP BLD-CCNC: 104 U/L (ref 40–129)
ALT SERPL-CCNC: 14 U/L (ref 10–40)
ANION GAP SERPL CALCULATED.3IONS-SCNC: 10 MMOL/L (ref 3–16)
AST SERPL-CCNC: 15 U/L (ref 15–37)
BILIRUB SERPL-MCNC: 0.3 MG/DL (ref 0–1)
BUN BLDV-MCNC: 13 MG/DL (ref 7–20)
CALCIUM SERPL-MCNC: 10.1 MG/DL (ref 8.3–10.6)
CHLORIDE BLD-SCNC: 104 MMOL/L (ref 99–110)
CHOLESTEROL, TOTAL: 161 MG/DL (ref 0–199)
CO2: 24 MMOL/L (ref 21–32)
CREAT SERPL-MCNC: <0.5 MG/DL (ref 0.6–1.2)
GFR SERPL CREATININE-BSD FRML MDRD: >60 ML/MIN/{1.73_M2}
GLUCOSE BLD-MCNC: 111 MG/DL (ref 70–99)
HDLC SERPL-MCNC: 47 MG/DL (ref 40–60)
LDL CHOLESTEROL CALCULATED: 96 MG/DL
POTASSIUM SERPL-SCNC: 4.3 MMOL/L (ref 3.5–5.1)
SODIUM BLD-SCNC: 138 MMOL/L (ref 136–145)
T4 FREE: 1.3 NG/DL (ref 0.9–1.8)
TOTAL PROTEIN: 9 G/DL (ref 6.4–8.2)
TRIGL SERPL-MCNC: 90 MG/DL (ref 0–150)
TSH SERPL DL<=0.05 MIU/L-ACNC: 2.02 UIU/ML (ref 0.27–4.2)
VITAMIN D 25-HYDROXY: 31.3 NG/ML
VLDLC SERPL CALC-MCNC: 18 MG/DL

## 2022-11-09 DIAGNOSIS — R31.9 HEMATURIA, UNSPECIFIED TYPE: Primary | ICD-10-CM

## 2022-11-09 LAB
ESTIMATED AVERAGE GLUCOSE: 122.6 MG/DL
HBA1C MFR BLD: 5.9 %
URINE CULTURE, ROUTINE: NORMAL

## 2022-11-11 ENCOUNTER — OFFICE VISIT (OUTPATIENT)
Dept: FAMILY MEDICINE CLINIC | Age: 67
End: 2022-11-11
Payer: COMMERCIAL

## 2022-11-11 ENCOUNTER — HOSPITAL ENCOUNTER (OUTPATIENT)
Dept: MAMMOGRAPHY | Age: 67
Discharge: HOME | End: 2022-11-11
Payer: COMMERCIAL

## 2022-11-11 VITALS
OXYGEN SATURATION: 97 % | BODY MASS INDEX: 37.94 KG/M2 | HEART RATE: 81 BPM | WEIGHT: 214.2 LBS | TEMPERATURE: 97.6 F | SYSTOLIC BLOOD PRESSURE: 128 MMHG | RESPIRATION RATE: 12 BRPM | DIASTOLIC BLOOD PRESSURE: 68 MMHG

## 2022-11-11 VITALS — BODY MASS INDEX: 38.09 KG/M2 | WEIGHT: 215 LBS | HEIGHT: 63 IN

## 2022-11-11 DIAGNOSIS — K59.00 CONSTIPATION, UNSPECIFIED CONSTIPATION TYPE: ICD-10-CM

## 2022-11-11 DIAGNOSIS — E11.9 CONTROLLED TYPE 2 DIABETES MELLITUS WITHOUT COMPLICATION, WITHOUT LONG-TERM CURRENT USE OF INSULIN (HCC): ICD-10-CM

## 2022-11-11 DIAGNOSIS — Z12.31 VISIT FOR SCREENING MAMMOGRAM: ICD-10-CM

## 2022-11-11 DIAGNOSIS — I10 ESSENTIAL HYPERTENSION: Chronic | ICD-10-CM

## 2022-11-11 DIAGNOSIS — R13.10 DYSPHAGIA, UNSPECIFIED TYPE: ICD-10-CM

## 2022-11-11 DIAGNOSIS — J45.909 MILD ASTHMA WITHOUT COMPLICATION, UNSPECIFIED WHETHER PERSISTENT: Chronic | ICD-10-CM

## 2022-11-11 DIAGNOSIS — E78.5 HYPERLIPIDEMIA, UNSPECIFIED HYPERLIPIDEMIA TYPE: Chronic | ICD-10-CM

## 2022-11-11 DIAGNOSIS — E03.9 ACQUIRED HYPOTHYROIDISM: Chronic | ICD-10-CM

## 2022-11-11 DIAGNOSIS — R77.8 ELEVATED TOTAL PROTEIN: Primary | ICD-10-CM

## 2022-11-11 PROCEDURE — 3044F HG A1C LEVEL LT 7.0%: CPT | Performed by: FAMILY MEDICINE

## 2022-11-11 PROCEDURE — 99214 OFFICE O/P EST MOD 30 MIN: CPT | Performed by: FAMILY MEDICINE

## 2022-11-11 PROCEDURE — 3078F DIAST BP <80 MM HG: CPT | Performed by: FAMILY MEDICINE

## 2022-11-11 PROCEDURE — 3074F SYST BP LT 130 MM HG: CPT | Performed by: FAMILY MEDICINE

## 2022-11-11 PROCEDURE — 77067 SCR MAMMO BI INCL CAD: CPT

## 2022-11-11 NOTE — PROGRESS NOTES
SUBJECTIVE:  61 y.o. Aleksandar Ohara female  for follow up of diabetes, hypertension, and hypercholesterolemia. Diabetic Review of Systems - medication compliance: compliant most of the time, diabetic diet compliance: noncompliant some of the time, home glucose monitoring: fasting values range 110-140s usually, further diabetic ROS: no polyuria or polydipsia, no chest pain, dyspnea or TIA's, no numbness, tingling or pain in extremities, last eye exam approximately >1 year ago. No further dysphagia. The Prilosec qd has helped with dietary modifications. She has improved her constipation with Miralax qod . Her bowel movements are qd to qod. She notices her headaches are flared with reading and vision. Current Outpatient Medications   Medication Sig Dispense Refill    cephALEXin (KEFLEX) 500 MG capsule Take 500 mg by mouth 4 times daily      nitrofurantoin, macrocrystal-monohydrate, (MACROBID) 100 MG capsule Take 1 capsule by mouth 2 times daily for 7 days 14 capsule 0    levothyroxine (SYNTHROID) 100 MCG tablet TAKE 1 TABLET BY MOUTH ONE TIME A DAY 90 tablet 1    losartan (COZAAR) 50 MG tablet Take 1 tablet by mouth daily 90 tablet 1    hydroCHLOROthiazide (HYDRODIURIL) 25 MG tablet Take 1 tablet by mouth every morning 90 tablet 1    atorvastatin (LIPITOR) 20 MG tablet TAKE 1 TABLET BY MOUTH EVERY DAY 90 tablet 1    omeprazole (PRILOSEC) 40 MG delayed release capsule Take 1 capsule by mouth every morning (before breakfast) 30 capsule 1    metFORMIN (GLUCOPHAGE) 500 MG tablet Take 1 tablet by mouth in the morning and 1 tablet in the evening. Take with meals.  180 tablet 1    montelukast (SINGULAIR) 10 MG tablet TAKE 1 TABLET BY MOUTH DAILY 90 tablet 1    albuterol sulfate HFA (PROAIR HFA) 108 (90 Base) MCG/ACT inhaler Inhale 2 puffs into the lungs every 6 hours as needed for Wheezing or Shortness of Breath 1 Inhaler 2    ONETOUCH ULTRA strip TEST 1 TIME A DAY AND AS NEEDED FOR SYMPTOMS OF FLUCTUATING GLUCOSE 300 strip 3 blood glucose monitor kit and supplies Check Blood sugars once daily and as needed. Include: monitor, strips, lancing device, lancets, control solutions, alcohol swabs. 1 kit 0    Lancets MISC Test 1 times a day & as needed for symptoms of fluctuating blood glucose. Dispense sufficient amount . 100 each 5    blood glucose monitor strips Test 1 times a day & as needed for symptoms of fluctuating blood glucose. Dispense sufficient amount . 100 strip 5    ONETOUCH VERIO strip 1 each by In Vitro route 2 times daily 100 each 3    ONETOUCH DELICA LANCETS 06U MISC 1 each by Does not apply route 2 times daily 60 each 5    fexofenadine (ALLEGRA) 180 MG tablet Take 1 tablet by mouth daily as needed (for allergies)      Blood Glucose Monitoring Suppl (ONETOUCH VERIO FLEX SYSTEM) w/Device KIT 1 Device by Does not apply route once for 1 dose 1 kit 0     No current facility-administered medications for this visit. ROS: All other systems were reviewed and were negative . Patient's allergies and medications were reviewed. Patient's past medical, surgical, social , and family history were reviewed. OBJECTIVE:  /68   Pulse 81   Temp 97.6 °F (36.4 °C) (Temporal)   Resp 12   Wt 214 lb 3.2 oz (97.2 kg)   SpO2 97%   BMI 37.94 kg/m²   General: NAD, cooperative, alert and oriented X 3, affect / mood is good. Good insight. well hydrated. Neck : no lymphadenopathy, supple, FROM  CV: Regular rate and rhythm , no murmurs/ rub/ gallop. No edema. Lungs : CTA bilaterally, breathing comfortably  Abdomen: positive bowel sounds, soft , non tender, non distended. No hepatosplenomegaly. Feet: normal sensation to monofilament bilaterally, no ulcers. DP/ PT pulses normal.   Skin: no rashes. Non tender. ASSESSMENT:  1. Elevated total protein  - Referral - [facility] - Dontae Torres DO, Hematology Oncology, [facility]    2.  Controlled type 2 diabetes mellitus without complication, without long-term current use of insulin ([facility]  - SutK7o=8.9. Controlled. - Continue Metformin and dietary/ lifestyle modifications, including decreased concentrated sweets and carbohydrates. 3. Acquired hypothyroidism  - Stable. Continue Synthroid 0.100 mg qd.     4. Essential hypertension  - Controlled. Continue Losartan 50 mg qd,HCTZ 25 mg qd and low sodium diet. 5. Hyperlipidemia, unspecified hyperlipidemia type  - Controlled . Continue Lipitor and low cholesterol diet. 6. Mild asthma without complication, unspecified whether persistent  - Stable. Continue Singulair qd and Albuterol HFA prn.     7. Dysphagia, unspecified type  - Stable. Continue Prilosec qd, but will attempt to taper off . If unable to do so, EGD should be considered. - Continue dietary/ lifestyle modifications. 8. Constipation, unspecified constipation type  - Improved. Continue Miralax qod. PLAN:  See orders for this visit as documented in the electronic medical record. Issues reviewed with her: low cholesterol diet, weight control and daily exercise discussed, home glucose monitoring emphasized, all medications, side effects and compliance discussed carefully, foot care discussed and Podiatry visits discussed, annual eye examinations at Ophthalmology discussed, glycohemoglobin and other lab monitoring discussed, and long term diabetic complications discussed. Goals:   1) Blood pressure < 130/80  2) HGA1C ideal less than 6.5, at least less than 7.0  3) LDL cholesterol < 100  4) Exercise 150 minutes a week   5) Dilated eye exam by an optometrist or ophthalmologist once a year  6) Fasting blood sugar < 110  7) Glucose 2 hours after meal < 140  8) Aspirin 81 mg once a day  9) BMI (Body Mass Index) ideal < 25, at least less than 30. Weight loss of even 10 to 15 pounds is effective in improving diabetes  9) Total fat intake to less than 60 grams per day and saturated fat to less than 20 grams per day.     10) Diabetic education and diabetic nutrition classes. [phone]) Doctor visits every 3 months. Follow up 3 months/ prn.

## 2022-11-15 ENCOUNTER — HOSPITAL ENCOUNTER (OUTPATIENT)
Dept: ULTRASOUND IMAGING | Age: 67
Discharge: HOME | End: 2022-11-15
Payer: COMMERCIAL

## 2022-11-15 DIAGNOSIS — E04.1 THYROID NODULE: ICD-10-CM

## 2022-11-15 PROCEDURE — 76536 US EXAM OF HEAD AND NECK: CPT

## 2022-11-17 LAB — DIABETIC RETINOPATHY: NEGATIVE

## 2022-11-21 ENCOUNTER — TELEPHONE (OUTPATIENT)
Dept: FAMILY MEDICINE CLINIC | Age: 67
End: 2022-11-21

## 2022-11-21 DIAGNOSIS — J45.20 MILD INTERMITTENT ASTHMA WITHOUT COMPLICATION: Chronic | ICD-10-CM

## 2022-11-21 RX ORDER — ALBUTEROL SULFATE 90 UG/1
2 AEROSOL, METERED RESPIRATORY (INHALATION) EVERY 6 HOURS PRN
Qty: 1 EACH | Refills: 2 | Status: SHIPPED | OUTPATIENT
Start: 2022-11-21 | End: 2022-11-22

## 2022-11-21 NOTE — TELEPHONE ENCOUNTER
Called patient and spoke with her. Advised her of information below. Patient states understanding. Patient will either try to attach to My Chart or bring in the paperwork.   Patient also states that she is getting disability claim papers faxed from [facility] albuterol sulfate HFA (PROAIR HFA) 108 (90 Base) MCG/ACT inhaler [[id number]]     Order Details  Dose: 2 puff Route: Inhalation Frequency: EVERY 6 HOURS PRN for Wheezing, Shortness of Breath   Dispense Quantity: 1 Inhaler Refills: 2          Sig: Inhale 2 puffs into the lungs every 6 hours as needed for Wheezing or Shortness of Breath   [facility] El[address]io Funes  Cathleen Perdomo [phone]     Last ov: 11/11/22  Last labs: 11/09/22

## 2022-11-21 NOTE — TELEPHONE ENCOUNTER
Requested Prescriptions     Pending Prescriptions Disp Refills    albuterol sulfate HFA (PROAIR HFA) 108 (90 Base) MCG/ACT inhaler 1 each 2     Sig: Inhale 2 puffs into the lungs every 6 hours as needed for Wheezing or Shortness of Breath     Last ov: 11/11/22  Last labs: 11/09/22

## 2022-11-22 RX ORDER — ALBUTEROL SULFATE 90 UG/1
2 AEROSOL, METERED RESPIRATORY (INHALATION) EVERY 6 HOURS PRN
Qty: 20.1 G | Refills: 1 | Status: SHIPPED | OUTPATIENT
Start: 2022-11-22

## 2022-11-22 NOTE — TELEPHONE ENCOUNTER
Requested Prescriptions     Pending Prescriptions Disp Refills    albuterol sulfate HFA (PROVENTIL;VENTOLIN;PROAIR) 108 (90 Base) MCG/ACT inhaler [Pharmacy Med Name: ALBUTEROL HFA INH (200 PUFFS) 6.7GM] 20.1 g      Sig: INHALE 2 PUFFS INTO THE LUNGS EVERY 6 HOURS AS NEEDED FOR WHEEZING OR SHORTNESS OF BREATH     Last ov 11/11/2022      Requesting 90 day supply

## 2022-12-13 DIAGNOSIS — R14.2 BELCHING: ICD-10-CM

## 2022-12-13 DIAGNOSIS — R13.10 DYSPHAGIA, UNSPECIFIED TYPE: ICD-10-CM

## 2022-12-13 RX ORDER — OMEPRAZOLE 40 MG/1
CAPSULE, DELAYED RELEASE ORAL
Qty: 30 CAPSULE | Refills: 1 | Status: SHIPPED | OUTPATIENT
Start: 2022-12-13

## 2022-12-13 RX ORDER — OMEPRAZOLE 40 MG/1
CAPSULE, DELAYED RELEASE ORAL
Qty: 90 CAPSULE | OUTPATIENT
Start: 2022-12-13

## 2022-12-13 NOTE — TELEPHONE ENCOUNTER
Requested Prescriptions     Refused Prescriptions Disp Refills    omeprazole (PRILOSEC) 40 MG delayed release capsule [Pharmacy Med Name: OMEPRAZOLE 40MG CAPSULES] 90 capsule      Sig: TAKE 1 CAPSULE BY MOUTH EVERY MORNING BEFORE BREAKFAST          RX sent in on 121/13/22 30 tabs 1 refills

## 2022-12-19 PROBLEM — D47.2 SMOLDERING MULTIPLE MYELOMA: Status: ACTIVE | Noted: 2022-12-19

## 2022-12-19 PROBLEM — D47.2 MONOCLONAL GAMMOPATHY OF UNKNOWN SIGNIFICANCE (MGUS): Status: ACTIVE | Noted: 2022-12-19

## 2022-12-19 PROBLEM — K86.89 PANCREATIC MASS: Status: ACTIVE | Noted: 2022-12-19

## 2022-12-19 RX ORDER — AMLODIPINE BESYLATE AND ATORVASTATIN CALCIUM 5; 10 MG/1; MG/1
20 TABLET, FILM COATED ORAL
COMMUNITY

## 2022-12-19 RX ORDER — NICOTINE POLACRILEX 4 MG/1
40 GUM, CHEWING ORAL
COMMUNITY
End: 2023-01-03

## 2022-12-19 RX ORDER — LOSARTAN POTASSIUM 100 MG/1
50 TABLET ORAL
COMMUNITY
End: 2023-01-03

## 2022-12-19 RX ORDER — SODIUM FLUORIDE AND POTASSIUM NITRATE 5.8; 57.5 MG/ML; MG/ML
GEL, DENTIFRICE DENTAL
COMMUNITY
Start: 2022-11-08

## 2022-12-19 RX ORDER — LORAZEPAM 1 MG/1
1 TABLET ORAL
COMMUNITY
Start: 2022-12-01 | End: 2023-01-03

## 2022-12-19 RX ORDER — LEVOTHYROXINE SODIUM 100 UG/1
CAPSULE ORAL
COMMUNITY
End: 2023-01-03 | Stop reason: SDUPTHER

## 2022-12-19 RX ORDER — FEXOFENADINE HCL 180 MG/1
180 TABLET ORAL
COMMUNITY

## 2022-12-19 RX ORDER — LORAZEPAM 1 MG/1
TABLET ORAL
COMMUNITY
Start: 2022-12-01 | End: 2023-01-03

## 2022-12-19 NOTE — PROGRESS NOTES
[facility]  [facility] [address] [facility] [address] , [address] [place] [address] Ave  Phone: [phone]  Fax: [phone]    Visit Date: 12/20/2022    Subjective:   Nazia Gamboa is a 74 y.o. female who is here today with her daughter referred by Derrick Fierro for a Pancreatic Mass. Patient  originally was noted to have elevated protein and a serum protein electrophoresis showed an M protein of 2.1 In June 2021. She had a normal renal function, calcium. Patient denied any bone pain, fatigue or weight loss. She is eating and drinking well. Denies nausea and vomiting. She is active and care's for her grandkids. She denies any chest pain or shortness of breath or any neuropathy. Patient is asymptomatic without any abdominal pain nausea vomiting or diarrhea. Lap band placed 5 years ago. No change in weight over the last 2-3 years. She was noticed to have pancreatic mass during work up for myeloma. History was also taken from daughter to understand the activity level of patient.       Past Medical History:   Diagnosis Date    Asthma     Elevated liver function tests 10/21/2016    Essential hypertension 12/12/2016    GERD (gastroesophageal reflux disease)     History of cystocele 9/12/2016    Hyperglycemia 10/21/2016    Hyperlipidemia     Hypertension     Hypothyroidism 10/21/2016    Migraine     Morbid obesity due to excess calories ([address] 10/21/2016    Reina's neuroma of left foot 10/21/2016    Non morbid obesity due to excess calories 9/12/2016    Obesity     Obstructive sleep apnea 10/21/2016    Osteoarthritis     Prediabetes 10/21/2016    Rectocele 9/12/2016    Seasonal allergic rhinitis 12/12/2016    Snoring 10/21/2016    Status post gastric banding 4/26/2010    Uterovaginal prolapse 9/12/2016     Past Surgical History:   Procedure Laterality Date    ABDOMINOPLASTY  1996    [place], [address]  09/21/2012    Dr. Ott Rater [address] [facility]      COLONOSCOPY  12/04/2013    Dr. Saida Ruano    CYSTOSCOPY  09/21/2012    Dr. Devin Terry Left 10/25/2016    Dr. Johnny Singletary Left 01/2020    HYSTERECTOMY, VAGINAL  09/21/2012    Dr. Marisol Guzman    LAP BAND  01/15/2010    Dr. Triston Kumar Left 07/26/2010    Dr. Milligan Pore - incisional biopsy of left tonsillar mass    [address], [place]    UPPER GASTROINTESTINAL ENDOSCOPY  01/23/2015    Dr. Gayle Graham  10/16/2010    Dr. Tamera Aburto    work related injury, removed a bone       Social History     Tobacco Use    Smoking status: Never    Smokeless tobacco: Never   Vaping Use    Vaping Use: Never used   Substance Use Topics    Alcohol use: No    Drug use: No     Family History   Problem Relation Age of Onset    Cancer Mother     Diabetes Father     High Blood Pressure Father     High Cholesterol Father     Heart Disease Father     Other Sister         Brain tumor Surgery-didn't wake up    Breast Cancer Sister 64    No Known Problems Sister     No Known Problems Sister     Diabetes Brother     Cancer Brother         cancer    Cancer Brother         bone    No Known Problems Brother     No Known Problems Brother     No Known Problems Brother     No Known Problems Brother     No Known Problems Brother     No Known Problems Daughter     No Known Problems Daughter     No Known Problems Daughter     No Known Problems Daughter     No Known Problems Daughter     No Known Problems Son     Asthma Other     Uterine Cancer Other     Heart Disease Other     Depression Other     Diabetes Other     High Cholesterol Other     High Blood Pressure Other     Kidney Disease Other Migraines Other     Stroke Other     Arthritis Other        Allergies: Latex, Fruit & vegetable daily [nutritional supplements], Kiwi extract, Lisinopril, and Other  Current Outpatient Medications   Medication Sig Dispense Refill    amLODIPine-atorvastatatin (CADUET) 5-10 MG per tablet Take 20 mg by mouth      fexofenadine (ALLEGRA) 180 MG tablet Take 180 mg by mouth      Levothyroxine Sodium 100 MCG CAPS Take by mouth      LORazepam (ATIVAN) 1 MG tablet Take 1 tablet by mouth. LORazepam (ATIVAN) 1 MG tablet TAKE 1 TABLET BY MOUTH ONCE AS NEEDED FOR SLEEP. TAKE 1 TABLET PRIOR TO BONE MARROW BIOPSY      PREVIDENT 5000 SENSITIVE 1.1-5 % GEL       omeprazole 20 MG EC tablet Take 40 mg by mouth      metFORMIN (GLUCOPHAGE) 500 MG tablet Take 500 mg by mouth      losartan (COZAAR) 100 MG tablet Take 50 mg by mouth      omeprazole (PRILOSEC) 40 MG delayed release capsule TAKE 1 CAPSULE BY MOUTH EVERY MORNING BEFORE BREAKFAST 30 capsule 1    albuterol sulfate HFA (PROVENTIL;VENTOLIN;PROAIR) 108 (90 Base) MCG/ACT inhaler INHALE 2 PUFFS INTO THE LUNGS EVERY 6 HOURS AS NEEDED FOR WHEEZING OR SHORTNESS OF BREATH 20.1 g 1    cephALEXin (KEFLEX) 500 MG capsule Take 500 mg by mouth 4 times daily      levothyroxine (SYNTHROID) 100 MCG tablet TAKE 1 TABLET BY MOUTH ONE TIME A DAY 90 tablet 1    losartan (COZAAR) 50 MG tablet Take 1 tablet by mouth daily 90 tablet 1    hydroCHLOROthiazide (HYDRODIURIL) 25 MG tablet Take 1 tablet by mouth every morning 90 tablet 1    atorvastatin (LIPITOR) 20 MG tablet TAKE 1 TABLET BY MOUTH EVERY DAY 90 tablet 1    metFORMIN (GLUCOPHAGE) 500 MG tablet Take 1 tablet by mouth in the morning and 1 tablet in the evening. Take with meals.  180 tablet 1    montelukast (SINGULAIR) 10 MG tablet TAKE 1 TABLET BY MOUTH DAILY 90 tablet 1    ONETOUCH ULTRA strip TEST 1 TIME A DAY AND AS NEEDED FOR SYMPTOMS OF FLUCTUATING GLUCOSE 300 strip 3    blood glucose monitor kit and supplies Check Blood sugars once daily and as needed. Include: monitor, strips, lancing device, lancets, control solutions, alcohol swabs. 1 kit 0    Lancets MISC Test 1 times a day & as needed for symptoms of fluctuating blood glucose. Dispense sufficient amount . 100 each 5    blood glucose monitor strips Test 1 times a day & as needed for symptoms of fluctuating blood glucose. Dispense sufficient amount . 100 strip 5    ONETOUCH VERIO strip 1 each by In Vitro route 2 times daily 100 each 3    ONETOUCH DELICA LANCETS 34G MISC 1 each by Does not apply route 2 times daily 60 each 5    fexofenadine (ALLEGRA) 180 MG tablet Take 1 tablet by mouth daily as needed (for allergies)      Blood Glucose Monitoring Suppl (ONETOUCH VERIO FLEX SYSTEM) w/Device KIT 1 Device by Does not apply route once for 1 dose 1 kit 0     No current facility-administered medications for this visit. Review of Systems: See HPI. All other systems reviewed and are negative. Objective:     Vitals:    12/20/22 1116   BP: 136/76   Site: Right Upper Arm   Pulse: 84   Temp: 98.2 °F (36.8 °C)   TempSrc: Temporal   Weight: 212 lb 9.6 oz (96.4 kg)   Height: 5' 3\" (1.6 m)       Physical Exam:   General:  Alert, oriented x 3, cooperative, no distress, appears stated age. Skin: Skin color, texture, turgor normal.    Lymph nodes: Cervical, supraclavicular, and axillary nodes normal.   HENT:  Normocephalic, without obvious abnormality. Moist mucus membranes. No icterus. Lungs: No respiratory distress. Heart:  Regular rate and rhythm. No murmur. Abdomen: Obese, Soft, non-tender. No masses,  No organomegaly. Incisional scar present. Extremities: Extremities normal, atraumatic, no cyanosis or edema. Neurologic: Grossly intact.    Psychiatric: Appropriate mood and thought process     Imaging:     Labs:  No results found for: PSA, CEA, , GI4586,   Lab Results   Component Value Date    WBC 6.4 06/15/2017    HGB 12.4 06/15/2017    HCT 38.4 06/15/2017    MCV 91.7 06/15/2017     06/15/2017     Lab Results   Component Value Date     11/08/2022    K 4.3 11/08/2022     11/08/2022    CO2 24 11/08/2022    BUN 13 11/08/2022    CREATININE <0.5 (L) 11/08/2022    GLUCOSE 111 (H) 11/08/2022    CALCIUM 10.1 11/08/2022    PROT 9.0 (H) 11/08/2022    LABALBU 3.8 11/08/2022    BILITOT 0.3 11/08/2022    ALKPHOS 104 11/08/2022    AST 15 11/08/2022    ALT 14 11/08/2022    LABGLOM >60 11/08/2022    GFRAA >60 07/28/2022    AGRATIO 0.7 (L) 11/08/2022    GLOB 5.3 06/23/2021       Albumin: 3.2  ALK Phos: 125    -Labs suggest M protein of 3.4, IgG 4369, lambda light chain 98, FLC ratio 0.08  -CBC with mild anemia, hgb of 10.5  -CMP normal and clinically asymptomatic   - 24 UPEP normal  -bone marrow bx with 30% plasma cell population , complex cytogenetics, TP53 mutated    PET 12/9/22 Impression:    1. No scintigraphic evidence of active myeloma. 2. 12 mm hypermetabolic nodule in the pancreatic tail concerning for pancreatic neoplasm. 3. 12 cm fat density in the left popliteal without FDG uptake. This is consistent with lipoma or well differentiated lipomatous neoplasm. Assessment/Plan:      Diagnosis Orders   1. Pancreatic mass  CANCER ANTIGEN 19-9    CEA    CHROMOGRANIN A      2. Class 2 severe obesity due to excess calories with serious comorbidity and body mass index (BMI) of 37.0 to 37.9 in [facility])          I discussed with the Roberto Miller about the diagnosis and management options. Based on the available information patient appears to have a Pancreatic Mass. Differential diagnoses discussed including pancreatic cancer and neuroendocrine cancer. I explained her about robotic / laparoscopic and open Pancreatic Surgery vs obtaining a biopsy of the mass. Printed information was given. All the complications including bleeding, infection, pancreatic leak, clots and wound healing were explained. Risks, benefits and alternatives of surgery explained to the patient.  All the questions of the patient are answered to patient's apparent satisfaction. Patient verbalized understanding of the management plan. Patient is at high risk for surgery due to obesity and previous surgery in the area. Imaging studies reviewed with the patient and her daughter. I personally interpreted PET images - small pancreatic mass. No clear evidence of mets. Will follow MRI result's on 12/22/22. Discussed possible EUS to obtain a biopsy - referral given  Will have pt see Dr. Paola Orourke for possible gastric band removal at the same time.   Follow up with Dr. Jose Antonio Rojas  Follow up in 3-4 weeks    Alexandro Shearer MD  Surgery Attending

## 2022-12-20 ENCOUNTER — OFFICE VISIT (OUTPATIENT)
Dept: SURGERY | Age: 67
End: 2022-12-20
Payer: COMMERCIAL

## 2022-12-20 ENCOUNTER — TELEPHONE (OUTPATIENT)
Dept: SURGERY | Age: 67
End: 2022-12-20

## 2022-12-20 VITALS
HEART RATE: 84 BPM | DIASTOLIC BLOOD PRESSURE: 76 MMHG | HEIGHT: 63 IN | SYSTOLIC BLOOD PRESSURE: 136 MMHG | TEMPERATURE: 98.2 F | WEIGHT: 212.6 LBS | BODY MASS INDEX: 37.67 KG/M2

## 2022-12-20 DIAGNOSIS — K86.89 PANCREATIC MASS: Primary | ICD-10-CM

## 2022-12-20 DIAGNOSIS — E66.01 CLASS 2 SEVERE OBESITY DUE TO EXCESS CALORIES WITH SERIOUS COMORBIDITY AND BODY MASS INDEX (BMI) OF 37.0 TO 37.9 IN ADULT (HCC): ICD-10-CM

## 2022-12-20 DIAGNOSIS — K86.89 PANCREATIC MASS: ICD-10-CM

## 2022-12-20 LAB — CEA: 1.1 NG/ML (ref 0–5)

## 2022-12-20 PROCEDURE — 3074F SYST BP LT 130 MM HG: CPT | Performed by: SURGERY

## 2022-12-20 PROCEDURE — 99205 OFFICE O/P NEW HI 60 MIN: CPT | Performed by: SURGERY

## 2022-12-20 PROCEDURE — 1123F ACP DISCUSS/DSCN MKR DOCD: CPT | Performed by: SURGERY

## 2022-12-20 PROCEDURE — 3078F DIAST BP <80 MM HG: CPT | Performed by: SURGERY

## 2022-12-20 NOTE — TELEPHONE ENCOUNTER
RN requested report of procedure report for colonoscopy/EGD by Dr. Juan Pablo Miramontes on 11/16/22.

## 2022-12-22 ENCOUNTER — HOSPITAL ENCOUNTER (OUTPATIENT)
Dept: MRI IMAGING | Age: 67
Discharge: HOME | End: 2022-12-22
Payer: COMMERCIAL

## 2022-12-22 DIAGNOSIS — K86.9 DISEASE OF PANCREAS, UNSPECIFIED: ICD-10-CM

## 2022-12-22 LAB — CA 19-9: 3 U/ML (ref 0–35)

## 2022-12-22 PROCEDURE — 74183 MRI ABD W/O CNTR FLWD CNTR: CPT

## 2022-12-22 PROCEDURE — A9576 INJ PROHANCE MULTIPACK: HCPCS | Performed by: STUDENT IN AN ORGANIZED HEALTH CARE EDUCATION/TRAINING PROGRAM

## 2022-12-22 PROCEDURE — 6360000004 HC RX CONTRAST MEDICATION: Performed by: STUDENT IN AN ORGANIZED HEALTH CARE EDUCATION/TRAINING PROGRAM

## 2022-12-22 RX ADMIN — GADOTERIDOL 20 ML: 279.3 INJECTION, SOLUTION INTRAVENOUS at 18:04

## 2022-12-23 LAB — CHROMOGRANIN A: 42 NG/ML (ref 0–103)

## 2022-12-27 RX ORDER — AMLODIPINE BESYLATE AND ATORVASTATIN CALCIUM 5; 10 MG/1; MG/1
20 TABLET, FILM COATED ORAL
COMMUNITY
End: 2023-01-19 | Stop reason: SDUPTHER

## 2022-12-27 RX ORDER — LOSARTAN POTASSIUM 100 MG/1
50 TABLET ORAL
COMMUNITY
End: 2023-01-03

## 2022-12-27 RX ORDER — LEVOTHYROXINE SODIUM 100 UG/1
CAPSULE ORAL
COMMUNITY
End: 2023-01-03 | Stop reason: SDUPTHER

## 2022-12-27 RX ORDER — OMEPRAZOLE 40 MG/1
40 CAPSULE, DELAYED RELEASE ORAL
COMMUNITY
End: 2023-01-03

## 2022-12-27 RX ORDER — FEXOFENADINE HCL 180 MG/1
180 TABLET ORAL
COMMUNITY
End: 2023-01-19 | Stop reason: SDUPTHER

## 2022-12-27 NOTE — PROGRESS NOTES
[facility] SURGICAL ONCOLOGY  [address]  Dept: [phone]  Dept Fax: [phone]    Visit Date: 12/30/2022    HPI:   Melody Kerns is a 74 y.o. female who is here today with her Daughter to discuss further management of her Pancreatic mass and to review recent Imaging studies and Tumor Markers. Patient is followed by Dr. Cortes Smalls for a history of Smoldering multiple myeloma. Patient had a Lap Band placed approximately 13 years ago. Having EUS with [email or web address]. Past Medical History:   Diagnosis Date    Asthma     Elevated liver function tests 10/21/2016    Essential hypertension 12/12/2016    GERD (gastroesophageal reflux disease)     History of cystocele 9/12/2016    Hyperglycemia 10/21/2016    Hyperlipidemia     Hypertension     Hypothyroidism 10/21/2016    Migraine     Morbid obesity due to excess calories ([address] 10/21/2016    Reina's neuroma of left foot 10/21/2016    Non morbid obesity due to excess calories 9/12/2016    Obesity     Obstructive sleep apnea 10/21/2016    Osteoarthritis     Prediabetes 10/21/2016    Rectocele 9/12/2016    Seasonal allergic rhinitis 12/12/2016    Snoring 10/21/2016    Status post gastric banding 4/26/2010    Uterovaginal prolapse 9/12/2016     Past Surgical History:   Procedure Laterality Date    ABDOMINOPLASTY  1996    [place], [address]  09/21/2012    Dr. Bimal Patel, [address]      COLONOSCOPY  12/04/2013    Dr. Trenton Plascencia.  [place]    CYSTOSCOPY  09/21/2012    Dr. Emory Gan Left 10/25/2016    Dr. Darius Mcclure Left 01/2020    HYSTERECTOMY, VAGINAL  09/21/2012    Dr. Claudia Crowe    LAP BAND  01/15/2010    Dr. Tereso Blake Left 07/26/2010 Dr. Fahad Mcdonald - incisional biopsy of left tonsillar mass    [address]    UPPER GASTROINTESTINAL ENDOSCOPY  01/23/2015    Dr. Hilda Carnes  10/16/2010    Dr. Alida Maza    work related injury, removed a bone       Current Outpatient Medications:     amLODIPine-atorvastatatin (CADUET) 5-10 MG per tablet, Take 20 mg by mouth, Disp: , Rfl:     fexofenadine (ALLEGRA) 180 MG tablet, Take 180 mg by mouth, Disp: , Rfl:     omeprazole (PRILOSEC) 40 MG delayed release capsule, Take 40 mg by mouth, Disp: , Rfl:     metFORMIN (GLUCOPHAGE) 500 MG tablet, Take 500 mg by mouth, Disp: , Rfl:     losartan (COZAAR) 100 MG tablet, Take 50 mg by mouth, Disp: , Rfl:     Levothyroxine Sodium 100 MCG CAPS, Take by mouth, Disp: , Rfl:     amLODIPine-atorvastatatin (CADUET) 5-10 MG per tablet, Take 20 mg by mouth, Disp: , Rfl:     fexofenadine (ALLEGRA) 180 MG tablet, Take 180 mg by mouth, Disp: , Rfl:     Levothyroxine Sodium 100 MCG CAPS, Take by mouth, Disp: , Rfl:     LORazepam (ATIVAN) 1 MG tablet, Take 1 tablet by mouth., Disp: , Rfl:     LORazepam (ATIVAN) 1 MG tablet, TAKE 1 TABLET BY MOUTH ONCE AS NEEDED FOR SLEEP.  TAKE 1 TABLET PRIOR TO BONE MARROW BIOPSY, Disp: , Rfl:     PREVIDENT 5000 SENSITIVE 1.1-5 % GEL, , Disp: , Rfl:     omeprazole 20 MG EC tablet, Take 40 mg by mouth, Disp: , Rfl:     metFORMIN (GLUCOPHAGE) 500 MG tablet, Take 500 mg by mouth, Disp: , Rfl:     losartan (COZAAR) 100 MG tablet, Take 50 mg by mouth, Disp: , Rfl:     omeprazole (PRILOSEC) 40 MG delayed release capsule, TAKE 1 CAPSULE BY MOUTH EVERY MORNING BEFORE BREAKFAST, Disp: 30 capsule, Rfl: 1    albuterol sulfate HFA (PROVENTIL;VENTOLIN;PROAIR) 108 (90 Base) MCG/ACT inhaler, INHALE 2 PUFFS INTO THE LUNGS EVERY 6 HOURS AS NEEDED FOR WHEEZING OR SHORTNESS OF BREATH, Disp: 20.1 g, Rfl: 1    cephALEXin (KEFLEX) 500 MG capsule, Take 500 mg by mouth 4 times daily, Disp: , Rfl:     levothyroxine (SYNTHROID) 100 MCG tablet, TAKE 1 TABLET BY MOUTH ONE TIME A DAY, Disp: 90 tablet, Rfl: 1    losartan (COZAAR) 50 MG tablet, Take 1 tablet by mouth daily, Disp: 90 tablet, Rfl: 1    hydroCHLOROthiazide (HYDRODIURIL) 25 MG tablet, Take 1 tablet by mouth every morning, Disp: 90 tablet, Rfl: 1    atorvastatin (LIPITOR) 20 MG tablet, TAKE 1 TABLET BY MOUTH EVERY DAY, Disp: 90 tablet, Rfl: 1    metFORMIN (GLUCOPHAGE) 500 MG tablet, Take 1 tablet by mouth in the morning and 1 tablet in the evening. Take with meals. , Disp: 180 tablet, Rfl: 1    montelukast (SINGULAIR) 10 MG tablet, TAKE 1 TABLET BY MOUTH DAILY, Disp: 90 tablet, Rfl: 1    ONETOUCH ULTRA strip, TEST 1 TIME A DAY AND AS NEEDED FOR SYMPTOMS OF FLUCTUATING GLUCOSE, Disp: 300 strip, Rfl: 3    blood glucose monitor kit and supplies, Check Blood sugars once daily and as needed. Include: monitor, strips, lancing device, lancets, control solutions, alcohol swabs., Disp: 1 kit, Rfl: 0    Lancets MISC, Test 1 times a day & as needed for symptoms of fluctuating blood glucose. Dispense sufficient amount ., Disp: 100 each, Rfl: 5    blood glucose monitor strips, Test 1 times a day & as needed for symptoms of fluctuating blood glucose.  Dispense sufficient amount ., Disp: 100 strip, Rfl: 5    ONETOUCH VERIO strip, 1 each by In Vitro route 2 times daily, Disp: 100 each, Rfl: 3    ONETOUCH DELICA LANCETS 40U MISC, 1 each by Does not apply route 2 times daily, Disp: 60 each, Rfl: 5    fexofenadine (ALLEGRA) 180 MG tablet, Take 1 tablet by mouth daily as needed (for allergies), Disp: , Rfl:     Blood Glucose Monitoring Suppl (ONETOUCH VERIO FLEX SYSTEM) w/Device KIT, 1 Device by Does not apply route once for 1 dose, Disp: 1 kit, Rfl: 0  Allergies   Allergen Reactions    Latex Rash    Fruit & Vegetable Daily [Nutritional Supplements] Other (See Comments)     Vomiting, onion, tomato, watermelon, peach    Kiwi Extract      LIPS ITCH    Lisinopril Other (See Comments)     Throat tightness. Other Itching     Itchy lips and mouth with fruits-watermelon, peaches,meloln, avocado     Past Surgical History:   Procedure Laterality Date    ABDOMINOPLASTY  1996    [place], [address]  09/21/2012    Dr. Arleen Herron, [address] [facility] [address]      COLONOSCOPY  12/04/2013    Dr. Maykel Ruano    CYSTOSCOPY  09/21/2012    Dr. Shruthi Osborn Left 10/25/2016    Dr. Mariana Bauman Left 01/2020    HYSTERECTOMY, VAGINAL  09/21/2012    Dr. Achilles Claude    LAP BAND  01/15/2010    Dr. Marleen Griggs Left 07/26/2010    Dr. Jace New - incisional biopsy of left tonsillar mass    [address] [facility]  01/23/2015    Dr. Jayjay Ramirez  10/16/2010    Dr. Louann Valdez    work related injury, removed a bone     Family History   Problem Relation Age of Onset    Cancer Mother     Diabetes Father     High Blood Pressure Father     High Cholesterol Father     Heart Disease Father     Other Sister         Brain tumor Surgery-didn't wake up    Breast Cancer Sister 64    No Known Problems Sister     No Known Problems Sister     Diabetes Brother     Cancer Brother         cancer    Cancer Brother         bone    No Known Problems Brother     No Known Problems Brother     No Known Problems Brother     No Known Problems Brother     No Known Problems Brother     No Known Problems Daughter     No Known Problems Daughter     No Known Problems Daughter     No Known Problems Daughter     No Known Problems Daughter     No Known Problems Son Asthma Other     Uterine Cancer Other     Heart Disease Other     Depression Other     Diabetes Other     High Cholesterol Other     High Blood Pressure Other     Kidney Disease Other     Migraines Other     Stroke Other     Arthritis Other        Social History:   Social History     Tobacco Use    Smoking status: Never    Smokeless tobacco: Never   Substance Use Topics    Alcohol use: No      Tobacco cessation counseling provided as appropriate. Review of Systems: See HPI, all other systems reviewed and are negative. Objective:     Physical Exam   BP (!) 143/80 (Site: Right Upper Arm)   Pulse 71   Temp 97.6 °F (36.4 °C) (Temporal)   Ht 5' 3\" (1.6 m)   Wt 213 lb 12.8 oz (97 kg)   BMI 37.87 kg/m²     Constitutional: Patient is oriented to person, place, and time. No distress. HENT:  Mucus membranes - moist. No scleral icterus. Neck:  Normal range of motion. No visible lymphadenopathy present. Pulmonary/Chest:  Effort normal. No respiratory distress. Bilateral symmetrical chest rise. No audible additional breath sounds. Abdomen:  Soft, non-tender. No masses, no organomegaly. Incisional scar present. Neurological:  Grossly intact motor and sensory systems on limited exam.   Skin: Skin is warm and dry. No rash noted. She is not diaphoretic. Psychiatric: Normal mood and affect. Her behavior is normal. Judgment and thought content normal.      CA 19-9 12/20/22: 3  CEA: 1.1  Chromogranin A: 42    MRI Abdomen 12/22/22:     Hypervascular 1.7 cm well marginated exophytic lesion in the pancreatic tail. Differential considerations include both benign and malignant neoplasm, including neuroendocrine tumor. This is amenable to CT-guided percutaneous biopsy. Assessment/Plan:      Diagnosis Orders   1. Pancreatic mass  Ida Emery DO, Bariatric Surgery, [facility]      2. LAP-BAND surgery status  Ida Emery DO, Bariatric Surgery, [facility]      3.  Class 2 severe obesity due to excess calories with serious comorbidity and body mass index (BMI) of 37.0 to 37.9 in adult [facility])          I reviewed with Yasmine Chávez and her Daughterabout the diagnosis and management options. Based on the available information patient appears to have a Pancreatic Mass. I reviewed with her about laparoscopic and open Pancreatic Surgery vs obtaining a biopsy of the mass. Printed information was given and explained at her last appointment also. All the complications including bleeding, infection, pancreatic leak, clots and wound healing were explained. Risks, benefits and alternatives of surgery reviewed with the patient. All the questions of the patient and her Daughter were answered to their apparent satisfaction. Patient verbalized understanding of the management plan. High risk nature of surgery due to obesity explained. Imaging studies and tumor markers reviewed with the patient and her daughter. MRI images personally interpreted by me - well circumscribed pancreatic lesion  Will follow EUS findings and Cytology on 1/4/2. Explained about possible removal of lap band at the same time - follow with Dr. Jv Stoddard. Follow up with Dr. Luis A Velasco.   Follow up in 3-4 weeks     Talisha Whitman MD  Surgery Attending 18-Jun-2018 16:17

## 2022-12-29 ENCOUNTER — TELEPHONE (OUTPATIENT)
Dept: SURGERY | Age: 67
End: 2022-12-29

## 2022-12-29 NOTE — TELEPHONE ENCOUNTER
MA spoke with patient and ask her if her EUS was scheduled and she said yes it is scheduled for 1/4/22 next Wednesday.

## 2022-12-30 ENCOUNTER — OFFICE VISIT (OUTPATIENT)
Dept: SURGERY | Age: 67
End: 2022-12-30
Payer: COMMERCIAL

## 2022-12-30 VITALS
TEMPERATURE: 97.6 F | WEIGHT: 213.8 LBS | HEART RATE: 71 BPM | BODY MASS INDEX: 37.88 KG/M2 | HEIGHT: 63 IN | DIASTOLIC BLOOD PRESSURE: 80 MMHG | SYSTOLIC BLOOD PRESSURE: 143 MMHG

## 2022-12-30 DIAGNOSIS — K86.89 PANCREATIC MASS: Primary | ICD-10-CM

## 2022-12-30 DIAGNOSIS — Z98.84 LAP-BAND SURGERY STATUS: ICD-10-CM

## 2022-12-30 DIAGNOSIS — E66.01 CLASS 2 SEVERE OBESITY DUE TO EXCESS CALORIES WITH SERIOUS COMORBIDITY AND BODY MASS INDEX (BMI) OF 37.0 TO 37.9 IN ADULT (HCC): ICD-10-CM

## 2022-12-30 PROCEDURE — 99214 OFFICE O/P EST MOD 30 MIN: CPT | Performed by: SURGERY

## 2022-12-30 PROCEDURE — 1123F ACP DISCUSS/DSCN MKR DOCD: CPT | Performed by: SURGERY

## 2022-12-30 PROCEDURE — 3078F DIAST BP <80 MM HG: CPT | Performed by: SURGERY

## 2022-12-30 PROCEDURE — 3074F SYST BP LT 130 MM HG: CPT | Performed by: SURGERY

## 2022-12-30 NOTE — LETTER
[facility]) Surgical Oncology and General Surgery  [address] [phone]  F [phone]   Lilian Edwards MD    Date of Surgery Monday January 30th   Time of Surgery 08:00     Lymphoscintigram time: Not needed                                                       PATIENT NAME: Jaida Mackey OF BIRTH: 1955  SEX: female   PHONE: [phone] (home)   PCP: Annie Novak MD                                            Procedure Name and CPT Code(s): Robotic / laparoscopic distal pancreatectomy possible splenectomy possible open: 08170, 84004    Diagnosis:     ICD-10-CM   1. Pancreatic mass  K86.89   2. LAP-BAND surgery status  Z98.84     Length of Procedure: 2 hours       Anesthesia:  General     Patient Status: Admit to Floor     Pre-Op to be done by: PCP    Pt Position:  supine    Other needs: Co-surgery with Dr. Derrek Bacon Needed: Draw day of surgery: CBC, CMP and Type and Screen    Other orders: EKG    Medications to be stopped 5 days before surgery: None    Allergies   Allergen Reactions    Latex Rash    Fruit & Vegetable Daily [Nutritional Supplements] Other (See Comments)     Vomiting, onion, tomato, watermelon, peach    Kiwi Extract      LIPS ITCH    Lisinopril Other (See Comments)     Throat tightness.     Other Itching     Itchy lips and mouth with fruits-watermelon, peaches,meloln, avocado        Preop Antibiotics: Ceftriaxone 1g IV and Flagyl 500mg IV on call to OR    Bowel Prep: Two dulcolax tablets for two days prior to surgery     Lilian Edwards MD  8:45 AM

## 2023-01-03 ENCOUNTER — OFFICE VISIT (OUTPATIENT)
Dept: FAMILY MEDICINE CLINIC | Age: 68
End: 2023-01-03
Payer: COMMERCIAL

## 2023-01-03 VITALS
RESPIRATION RATE: 12 BRPM | SYSTOLIC BLOOD PRESSURE: 132 MMHG | TEMPERATURE: 97.4 F | OXYGEN SATURATION: 100 % | DIASTOLIC BLOOD PRESSURE: 80 MMHG | BODY MASS INDEX: 37.45 KG/M2 | WEIGHT: 211.4 LBS | HEART RATE: 74 BPM

## 2023-01-03 DIAGNOSIS — E55.9 VITAMIN D DEFICIENCY: ICD-10-CM

## 2023-01-03 DIAGNOSIS — I10 PRIMARY HYPERTENSION: ICD-10-CM

## 2023-01-03 DIAGNOSIS — E78.5 HYPERLIPIDEMIA, UNSPECIFIED HYPERLIPIDEMIA TYPE: ICD-10-CM

## 2023-01-03 DIAGNOSIS — Z01.818 PRE-OP EXAM: Primary | ICD-10-CM

## 2023-01-03 DIAGNOSIS — J45.909 MILD ASTHMA WITHOUT COMPLICATION, UNSPECIFIED WHETHER PERSISTENT: ICD-10-CM

## 2023-01-03 DIAGNOSIS — K86.89 PANCREATIC MASS: ICD-10-CM

## 2023-01-03 DIAGNOSIS — E03.9 ACQUIRED HYPOTHYROIDISM: ICD-10-CM

## 2023-01-03 DIAGNOSIS — E11.9 CONTROLLED TYPE 2 DIABETES MELLITUS WITHOUT COMPLICATION, WITHOUT LONG-TERM CURRENT USE OF INSULIN (HCC): ICD-10-CM

## 2023-01-03 DIAGNOSIS — E66.01 SEVERE OBESITY (BMI 35.0-39.9) WITH COMORBIDITY (HCC): ICD-10-CM

## 2023-01-03 DIAGNOSIS — Z01.818 PRE-OP EXAM: ICD-10-CM

## 2023-01-03 DIAGNOSIS — J30.2 SEASONAL ALLERGIC RHINITIS, UNSPECIFIED TRIGGER: ICD-10-CM

## 2023-01-03 DIAGNOSIS — D47.2 SMOLDERING MULTIPLE MYELOMA: ICD-10-CM

## 2023-01-03 LAB
ALBUMIN SERPL-MCNC: 3.6 G/DL (ref 3.4–5)
ANION GAP SERPL CALCULATED.3IONS-SCNC: 12 MMOL/L (ref 3–16)
BUN BLDV-MCNC: 12 MG/DL (ref 7–20)
CALCIUM SERPL-MCNC: 9.4 MG/DL (ref 8.3–10.6)
CHLORIDE BLD-SCNC: 104 MMOL/L (ref 99–110)
CHP ED QC CHECK: NORMAL
CO2: 23 MMOL/L (ref 21–32)
CREAT SERPL-MCNC: 0.6 MG/DL (ref 0.6–1.2)
GFR SERPL CREATININE-BSD FRML MDRD: >60 ML/MIN/{1.73_M2}
GLUCOSE BLD-MCNC: 114 MG/DL
GLUCOSE BLD-MCNC: 83 MG/DL (ref 70–99)
HCT VFR BLD CALC: 31.7 % (ref 36–48)
HEMOGLOBIN: 10.5 G/DL (ref 12–16)
MCH RBC QN AUTO: 30.7 PG (ref 26–34)
MCHC RBC AUTO-ENTMCNC: 33.1 G/DL (ref 31–36)
MCV RBC AUTO: 92.8 FL (ref 80–100)
PDW BLD-RTO: 13.8 % (ref 12.4–15.4)
PHOSPHORUS: 3.2 MG/DL (ref 2.5–4.9)
PLATELET # BLD: 192 K/UL (ref 135–450)
PMV BLD AUTO: 9.9 FL (ref 5–10.5)
POTASSIUM SERPL-SCNC: 4 MMOL/L (ref 3.5–5.1)
RBC # BLD: 3.41 M/UL (ref 4–5.2)
SODIUM BLD-SCNC: 139 MMOL/L (ref 136–145)
WBC # BLD: 5.8 K/UL (ref 4–11)

## 2023-01-03 PROCEDURE — 93000 ELECTROCARDIOGRAM COMPLETE: CPT | Performed by: FAMILY MEDICINE

## 2023-01-03 PROCEDURE — 3079F DIAST BP 80-89 MM HG: CPT | Performed by: FAMILY MEDICINE

## 2023-01-03 PROCEDURE — 82962 GLUCOSE BLOOD TEST: CPT | Performed by: FAMILY MEDICINE

## 2023-01-03 PROCEDURE — 99214 OFFICE O/P EST MOD 30 MIN: CPT | Performed by: FAMILY MEDICINE

## 2023-01-03 PROCEDURE — 3075F SYST BP GE 130 - 139MM HG: CPT | Performed by: FAMILY MEDICINE

## 2023-01-03 ASSESSMENT — PATIENT HEALTH QUESTIONNAIRE - PHQ9
SUM OF ALL RESPONSES TO PHQ QUESTIONS 1-9: 0
SUM OF ALL RESPONSES TO PHQ9 QUESTIONS 1 & 2: 0
2. FEELING DOWN, DEPRESSED OR HOPELESS: 0
1. LITTLE INTEREST OR PLEASURE IN DOING THINGS: 0
SUM OF ALL RESPONSES TO PHQ QUESTIONS 1-9: 0

## 2023-01-03 NOTE — PROGRESS NOTES
Chief Complaint:     Makenna Acevedo is a 74 y.o. female who presents for a preoperative physical examination. She is scheduled to have EUS and biopsy done by doctor [facility] on 1/4/23. History of Present Illness:      She has a pancreatic mass per MRI ,which is 1.7 cm. She has smoldering multiple myeloma and is followed by Dr. Bettina Becerril. Patient denies chest pain, palpitations, or shortness of breath . No personal or family history of bleeding, clotting, or anesthesia problems. Past Medical History:   Diagnosis Date    Asthma     Elevated liver function tests 10/21/2016    Essential hypertension 12/12/2016    GERD (gastroesophageal reflux disease)     History of cystocele 9/12/2016    Hyperglycemia 10/21/2016    Hyperlipidemia     Hypertension     Hypothyroidism 10/21/2016    Migraine     Morbid obesity due to excess calories ([address] 10/21/2016    Reina's neuroma of left foot 10/21/2016    Non morbid obesity due to excess calories 9/12/2016    Obesity     Obstructive sleep apnea 10/21/2016    Osteoarthritis     Prediabetes 10/21/2016    Rectocele 9/12/2016    Seasonal allergic rhinitis 12/12/2016    Snoring 10/21/2016    Status post gastric banding 4/26/2010    Uterovaginal prolapse 9/12/2016        Review of patient's past surgical history indicates:     Past Surgical History:   Procedure Laterality Date    ABDOMINOPLASTY  1996    [place], [address]  09/21/2012    Dr. Anjali Dick, [address] [place] [address]      COLONOSCOPY  12/04/2013    Dr. Danitza Christina.  [place]    CYSTOSCOPY  09/21/2012    Dr. Kenzie Sinclair Left 10/25/2016    Dr. Edna Gannon Left 01/2020    HYSTERECTOMY, VAGINAL  09/21/2012    Dr. Padmini Julien    LAP BAND  01/15/2010 Dr. Smith Comp Left 07/26/2010    Dr. Reena Del Toro - incisional biopsy of left tonsillar mass    [address] GASTROINTESTINAL ENDOSCOPY  01/23/2015    Dr. Jennifer Benson  10/16/2010    Dr. Hilaria Blue    work related injury, removed a bone                                                   Current Outpatient Medications   Medication Sig Dispense Refill    amLODIPine-atorvastatatin (CADUET) 5-10 MG per tablet Take 20 mg by mouth      fexofenadine (ALLEGRA) 180 MG tablet Take 180 mg by mouth      Levothyroxine Sodium 100 MCG CAPS Take by mouth      amLODIPine-atorvastatatin (CADUET) 5-10 MG per tablet Take 20 mg by mouth      fexofenadine (ALLEGRA) 180 MG tablet Take 180 mg by mouth      Levothyroxine Sodium 100 MCG CAPS Take by mouth      PREVIDENT 5000 SENSITIVE 1.1-5 % GEL       omeprazole (PRILOSEC) 40 MG delayed release capsule TAKE 1 CAPSULE BY MOUTH EVERY MORNING BEFORE BREAKFAST 30 capsule 1    albuterol sulfate HFA (PROVENTIL;VENTOLIN;PROAIR) 108 (90 Base) MCG/ACT inhaler INHALE 2 PUFFS INTO THE LUNGS EVERY 6 HOURS AS NEEDED FOR WHEEZING OR SHORTNESS OF BREATH 20.1 g 1    cephALEXin (KEFLEX) 500 MG capsule Take 500 mg by mouth 4 times daily      levothyroxine (SYNTHROID) 100 MCG tablet TAKE 1 TABLET BY MOUTH ONE TIME A DAY 90 tablet 1    losartan (COZAAR) 50 MG tablet Take 1 tablet by mouth daily 90 tablet 1    metFORMIN (GLUCOPHAGE) 500 MG tablet Take 1 tablet by mouth in the morning and 1 tablet in the evening. Take with meals. 180 tablet 1    ONETOUCH ULTRA strip TEST 1 TIME A DAY AND AS NEEDED FOR SYMPTOMS OF FLUCTUATING GLUCOSE 300 strip 3    blood glucose monitor kit and supplies Check Blood sugars once daily and as needed. Include: monitor, strips, lancing device, lancets, control solutions, alcohol swabs.  1 kit 0    Lancets MISC Test 1 times a day & as needed for symptoms of fluctuating blood glucose. Dispense sufficient amount . 100 each 5    blood glucose monitor strips Test 1 times a day & as needed for symptoms of fluctuating blood glucose. Dispense sufficient amount . 100 strip 5    ONETOUCH VERIO strip 1 each by In Vitro route 2 times daily 100 each 3    ONETOUCH DELICA LANCETS 50Y MISC 1 each by Does not apply route 2 times daily 60 each 5    fexofenadine (ALLEGRA) 180 MG tablet Take 1 tablet by mouth daily as needed (for allergies)      LORazepam (ATIVAN) 1 MG tablet Take 1 tablet by mouth. (Patient not taking: Reported on 1/3/2023)      LORazepam (ATIVAN) 1 MG tablet TAKE 1 TABLET BY MOUTH ONCE AS NEEDED FOR SLEEP. TAKE 1 TABLET PRIOR TO BONE MARROW BIOPSY (Patient not taking: Reported on 1/3/2023)      hydroCHLOROthiazide (HYDRODIURIL) 25 MG tablet Take 1 tablet by mouth every morning (Patient not taking: Reported on 1/3/2023) 90 tablet 1    atorvastatin (LIPITOR) 20 MG tablet TAKE 1 TABLET BY MOUTH EVERY DAY (Patient not taking: Reported on 1/3/2023) 90 tablet 1    Blood Glucose Monitoring Suppl ([address]) w/Device KIT 1 Device by Does not apply route once for 1 dose 1 kit 0     No current facility-administered medications for this visit. Allergies   Allergen Reactions    Latex Rash    Fruit & Vegetable Daily [Nutritional Supplements] Other (See Comments)     Vomiting, onion, tomato, watermelon, peach    Kiwi Extract      LIPS ITCH    Lisinopril Other (See Comments)     Throat tightness.     Other Itching     Itchy lips and mouth with fruits-watermelon, peaches,meloln, avocado       Social History     Tobacco Use    Smoking status: Never    Smokeless tobacco: Never   Vaping Use    Vaping Use: Never used   Substance Use Topics    Alcohol use: No    Drug use: No        Family History   Problem Relation Age of Onset    Cancer Mother     Diabetes Father     High Blood Pressure Father     High Cholesterol Father Heart Disease Father     Other Sister         Brain tumor Surgery-didn't wake up    Breast Cancer Sister 64    No Known Problems Sister     No Known Problems Sister     Diabetes Brother     Cancer Brother         cancer    Cancer Brother         bone    No Known Problems Brother     No Known Problems Brother     No Known Problems Brother     No Known Problems Brother     No Known Problems Brother     No Known Problems Daughter     No Known Problems Daughter     No Known Problems Daughter     No Known Problems Daughter     No Known Problems Daughter     No Known Problems Son     Asthma Other     Uterine Cancer Other     Heart Disease Other     Depression Other     Diabetes Other     High Cholesterol Other     High Blood Pressure Other     Kidney Disease Other     Migraines Other     Stroke Other     Arthritis Other         Review Of Systems    Skin: no abnormal pigmentation, rash, scaling, itching, masses, hair or nail changes  Eyes: negative  Ears/Nose/Throat: negative  Respiratory: negative  Cardiovascular: negative  Gastrointestinal: negative  Genitourinary: negative  Musculoskeletal: negative  Neurologic: negative  Psychiatric: negative  Hematologic/Lymphatic/Immunologic: negative  Endocrine: negative       Objective:      /80   Pulse 74   Temp 97.4 °F (36.3 °C) (Temporal)   Resp 12   Wt 211 lb 6.4 oz (95.9 kg)   SpO2 100%   BMI 37.45 kg/m²   General appearance - healthy, alert, no distress  Skin - Skin color, texture, turgor normal. No rashes or lesions. Head - Normocephalic. No masses, lesions, tenderness or abnormalities  Eyes - conjunctivae/corneas clear. PERRLA, EOM's intact. Ears - External ears normal. Canals clear. TM's normal.  Nose/Sinuses - Nares normal. Septum midline. Mucosa normal. No drainage or sinus tenderness. Oropharynx - Lips, mucosa, and tongue normal. Teeth and gums normal. Oropharynx  clear  Neck - Neck supple. No adenopathy.  Thyroid symmetric, normal size,  Back - Back symmetric, no curvature. ROM normal. No CVA tenderness. Lungs - Percussion normal. Good diaphragmatic excursion. Lungs clear  Heart - Regular rate and rhythm, with no rub, murmur or gallop noted. No edema. Abdomen - Abdomen soft, non-tender. BS normal. No masses, organomegaly  Extremities - Extremities normal. No deformities, edema, or skin discoloration. Musculoskeletal - Spine ROM normal. Muscular strength intact. Peripheral pulses - radial=4/4,, femoral=4/4, popliteal=4/4, dorsalis pedis=4/4,  Neuro - Gait normal. Reflexes normal and symmetric. Sensation grossly normal.  No focal weakness    EKG: normal EKG, normal sinus rhythm. Unchanged from prior. Assessment:        Per encounter diagnoses  She is medically cleared for surgery and anesthesia. Avoid Aspirin, non steroidal anti inflammatory medications, including Motrin, Aleve, Ibuprofen, Advil; multi vitamins, Vitamin E, omega 3 fish oil, and glucosamine chondroitin for the 7 days prior to surgery. 1. Pre-op exam  - Medical clearance pending labs. - EKG 12 Lead - Clinic Performed  - CBC; Future  - Renal Function Panel; Future    2. Pancreatic mass  -Scheduled for surgery     3. Acquired hypothyroidism  - Stable. Continue Synthroid 0.100 mg qd. 4. Primary hypertension  - Controlled . Hold Losartan tonight.   - Continue amlodipine / Atorvastatin qd and low sodium diet. - Renal Function Panel; Future    5. Hyperlipidemia, unspecified hyperlipidemia type  - Stable. Continue Atorvastatin qd and low cholesterol diet. 6. Controlled type 2 diabetes mellitus without complication, without long-term current use of insulin [facility]  - [id number]=5.9 in 11/22. Controlled. - Hold Metformin this pm and am of surgery . 7. Mild asthma without complication, unspecified whether persistent  - Stable.  Albuterol HFA prn.     8. Smoldering multiple myeloma  - Followed by Dr. Cortes Smalls - Oncologist.    9. Seasonal allergic rhinitis, unspecified trigger  - Stable. 10. Severe obesity (BMI 35.0-39. 9) with comorbidity ([address]  - Weight loss recommended. Discussed dietary/ lifestyle modifications, including cardiovascular exercise > 150 minutes/ week. 11. Vitamin D deficiency  - Stable. Plan:          Per operating surgeon. See also orders filed with this encounter, if any. Follow up 3 months/ prn.

## 2023-01-05 ENCOUNTER — TELEPHONE (OUTPATIENT)
Dept: SURGERY | Age: 68
End: 2023-01-05

## 2023-01-05 NOTE — TELEPHONE ENCOUNTER
MA called patient to reschedule her appointment as Dr. Martha Proctor will be in surgery, MA did leave our contact information asking patient to call me back.

## 2023-01-11 ENCOUNTER — TELEPHONE (OUTPATIENT)
Dept: SURGERY | Age: 68
End: 2023-01-11

## 2023-01-11 NOTE — TELEPHONE ENCOUNTER
Patient called regarding re-scheduling her follow-up appointment. She has been re-scheduled for Tuesday, 1/17/23 at 3:15 PM. The patient will be out of town on 1/13/23, returning 1/14/23, and the clinical staff wanted her seen earliest available.      If needed, patient can be reached at [phone]

## 2023-01-11 NOTE — PROGRESS NOTES
[facility] SURGICAL ONCOLOGY  [address]  Dept: [phone]  Dept Fax: [phone]    Visit Date: 1/17/2023    HPI:   Stephanie Corrales is a 74 y.o. female who is here today with her Daughter to discuss further management of her Pancreatic mass and to review recent EUS findings and Cytology. Patient is followed by Dr. Niecy Villegas for a history of Smoldering multiple myeloma. S/P Lap Band placed approximately 13 years ago. Past Medical History:   Diagnosis Date    Asthma     Elevated liver function tests 10/21/2016    Essential hypertension 12/12/2016    GERD (gastroesophageal reflux disease)     History of cystocele 9/12/2016    Hyperglycemia 10/21/2016    Hyperlipidemia     Hypertension     Hypothyroidism 10/21/2016    Migraine     Morbid obesity due to excess calories ([address] 10/21/2016    Reina's neuroma of left foot 10/21/2016    Non morbid obesity due to excess calories 9/12/2016    Obesity     Obstructive sleep apnea 10/21/2016    Osteoarthritis     Prediabetes 10/21/2016    Rectocele 9/12/2016    Seasonal allergic rhinitis 12/12/2016    Snoring 10/21/2016    Status post gastric banding 4/26/2010    Uterovaginal prolapse 9/12/2016     Past Surgical History:   Procedure Laterality Date    ABDOMINOPLASTY  1996    [place], [address]  09/21/2012    Dr. Charbel Mcclellan, [address] [facility] [address]      COLONOSCOPY  12/04/2013    Dr. Hill Stark.  [place]    CYSTOSCOPY  09/21/2012    Dr. Beauchamp General Left 10/25/2016    Dr. Yordan Fong Left 01/2020    HYSTERECTOMY, VAGINAL  09/21/2012    Dr. Eliud Painting    LAP BAND  01/15/2010    Dr. Naima Simmons Left 07/26/2010    Dr. Anamaria Carter - incisional biopsy of left tonsillar mass    TONSILLECTOMY  1980    [address], [place]    UPPER GASTROINTESTINAL ENDOSCOPY  01/23/2015    Dr. Ashely Mijares  10/16/2010    Dr. Ron Ro    work related injury, removed a bone       Current Outpatient Medications:     Cholecalciferol 50 MCG (2000 UT) CAPS, Take 50 mcg by mouth daily, Disp: , Rfl:     amLODIPine-atorvastatatin (CADUET) 5-10 MG per tablet, Take 20 mg by mouth, Disp: , Rfl:     amLODIPine-atorvastatatin (CADUET) 5-10 MG per tablet, Take 20 mg by mouth, Disp: , Rfl:     PREVIDENT 5000 SENSITIVE 1.1-5 % GEL, , Disp: , Rfl:     omeprazole (PRILOSEC) 40 MG delayed release capsule, TAKE 1 CAPSULE BY MOUTH EVERY MORNING BEFORE BREAKFAST, Disp: 30 capsule, Rfl: 1    albuterol sulfate HFA (PROVENTIL;VENTOLIN;PROAIR) 108 (90 Base) MCG/ACT inhaler, INHALE 2 PUFFS INTO THE LUNGS EVERY 6 HOURS AS NEEDED FOR WHEEZING OR SHORTNESS OF BREATH, Disp: 20.1 g, Rfl: 1    levothyroxine (SYNTHROID) 100 MCG tablet, TAKE 1 TABLET BY MOUTH ONE TIME A DAY, Disp: 90 tablet, Rfl: 1    losartan (COZAAR) 50 MG tablet, Take 1 tablet by mouth daily, Disp: 90 tablet, Rfl: 1    metFORMIN (GLUCOPHAGE) 500 MG tablet, Take 1 tablet by mouth in the morning and 1 tablet in the evening. Take with meals. , Disp: 180 tablet, Rfl: 1    ONETOUCH ULTRA strip, TEST 1 TIME A DAY AND AS NEEDED FOR SYMPTOMS OF FLUCTUATING GLUCOSE, Disp: 300 strip, Rfl: 3    blood glucose monitor kit and supplies, Check Blood sugars once daily and as needed. Include: monitor, strips, lancing device, lancets, control solutions, alcohol swabs., Disp: 1 kit, Rfl: 0    Lancets MISC, Test 1 times a day & as needed for symptoms of fluctuating blood glucose. Dispense sufficient amount ., Disp: 100 each, Rfl: 5    blood glucose monitor strips, Test 1 times a day & as needed for symptoms of fluctuating blood glucose.  Dispense sufficient amount ., Disp: 100 strip, Rfl: 5    ONETOUCH VERIO strip, 1 each by In Vitro route 2 times daily, Disp: 100 each, Rfl: 3    ONETOUCH DELICA LANCETS 78T MISC, 1 each by Does not apply route 2 times daily, Disp: 60 each, Rfl: 5    fexofenadine (ALLEGRA) 180 MG tablet, Take 180 mg by mouth (Patient not taking: No sig reported), Disp: , Rfl:     fexofenadine (ALLEGRA) 180 MG tablet, Take 180 mg by mouth (Patient not taking: No sig reported), Disp: , Rfl:     Blood Glucose Monitoring Suppl (ONETOUCH VERIO FLEX SYSTEM) w/Device KIT, 1 Device by Does not apply route once for 1 dose, Disp: 1 kit, Rfl: 0  Allergies   Allergen Reactions    Latex Rash    Fruit & Vegetable Daily [Nutritional Supplements] Other (See Comments)     Vomiting, onion, tomato, watermelon, peach    Kiwi Extract      LIPS ITCH    Lisinopril Other (See Comments)     Throat tightness. Other Itching     Itchy lips and mouth with fruits-watermelon, peaches,meloln, avocado     Past Surgical History:   Procedure Laterality Date    ABDOMINOPLASTY  1996    [place], [address]  09/21/2012    Dr. Bret Olson, [address] [facility] [address]      COLONOSCOPY  12/04/2013    Dr. Amado Ruano    CYSTOSCOPY  09/21/2012    Dr. Nargis Gomez Left 10/25/2016    Dr. Lukas Pacheco Left 01/2020    HYSTERECTOMY, VAGINAL  09/21/2012    Dr. Da Vides    LAP BAND  01/15/2010    Dr. Asha Pittman Left 07/26/2010    Dr. Claudetta Anger - incisional biopsy of left tonsillar mass    [address] GASTROINTESTINAL ENDOSCOPY  01/23/2015    Dr. Ball Garth  10/16/2010    Dr. Tal Keita    work related injury, removed a bone     Family History   Problem Relation Age of Onset    Cancer Mother     Diabetes Father     High Blood Pressure Father     High Cholesterol Father     Heart Disease Father     Other Sister         Brain tumor Surgery-didn't wake up    Breast Cancer Sister 64    No Known Problems Sister     No Known Problems Sister     Diabetes Brother     Cancer Brother         cancer    Cancer Brother         bone    No Known Problems Brother     No Known Problems Brother     No Known Problems Brother     No Known Problems Brother     No Known Problems Brother     No Known Problems Daughter     No Known Problems Daughter     No Known Problems Daughter     No Known Problems Daughter     No Known Problems Daughter     No Known Problems Son     Asthma Other     Uterine Cancer Other     Heart Disease Other     Depression Other     Diabetes Other     High Cholesterol Other     High Blood Pressure Other     Kidney Disease Other     Migraines Other     Stroke Other     Arthritis Other        Social History:   Social History     Tobacco Use    Smoking status: Never    Smokeless tobacco: Never   Substance Use Topics    Alcohol use: No      Tobacco cessation counseling provided as appropriate. Review of Systems: See HPI, all other systems reviewed and are negative. Objective:     Physical Exam   BP (!) 146/77 (Site: Left Upper Arm)   Pulse 74   Temp 98 °F (36.7 °C) (Temporal)   Ht 5' 3\" (1.6 m)   Wt 213 lb (96.6 kg)   BMI 37.73 kg/m²     Constitutional: Patient is oriented to person, place, and time. No distress. HENT:  Mucus membranes - moist. No scleral icterus. Neck:  Normal range of motion. No visible lymphadenopathy present. Pulmonary/Chest:  Effort normal. No respiratory distress. Bilateral symmetrical chest rise. No audible additional breath sounds. Abdomen:  Soft, non-tender. No masses, no organomegaly. Neurological:  Grossly intact motor and sensory systems on limited exam.   Skin: Skin is warm and dry. No rash noted. She is not diaphoretic. Psychiatric: Normal mood and affect. Her behavior is normal. Judgment and thought content normal.        CA 19-9 12/20/22: 3  CEA: 1.1  Chromogranin A: 42       EUS 1/4/23  IMPRESSION:     1. A 15 x 12 mm size hypoechoic lesion with echogenic stranding noted involving the tail of the pancreas. No vascular or pancreatic ductal involvement noted. EUS morphological features suggestive for neuroendocrine tumor. The EUS guided biopsies were performed. 2. Other small hypoechoic lesion measuring 6 x 4 mm in size was also noted within the body of the pancreas, which could represent a 2nd lesion. This lesion was not biopsied. Cytology FINAL DIAGNOSIS:     Pancreatic Tail Mass: Low-grade neuroendocrine tumor. Assessment/Plan:      Diagnosis Orders   1. Pancreatic mass        2. Neuroendocrine tumor        3. Class 2 severe obesity due to excess calories with serious comorbidity and body mass index (BMI) of 37.0 to 37.9 in [facility])          I reviewed with Gelacio Dumont and her Daughterabout the diagnosis and management options. Based on the available information patient appears to have a Pancreatic Neuroendocrine Tumor. I reviewed with her and her daughter about laparoscopic and open Pancreatic Surgery. Printed information was given and explained at her last appointment. All the complications including bleeding, infection, pancreatic leak, clots and wound healing were reviewed. Risks, benefits and alternatives of surgery reviewed with the patient and her Daughter. All the questions of the patient and her Daughter were answered to their apparent satisfaction. Patient verbalized understanding of the management plan. She is at higher risk of surgery due to obesity and previous surgery. EUS findings and Cytology reviewed with the patient and her daughter. Explained about possible removal of lap band at the same time. Follow up with Dr. Mack Delgado. Discussed with Dr. Amado Corral. Kisha Riley MD  Surgery Attending

## 2023-01-11 NOTE — TELEPHONE ENCOUNTER
Pt called about a voicemail she received to reschedule appt on 1/20 to 1/13. Pt is not able to be seen on 1/13 due to being out of town. Pt is very concerned about keeping the 3 week follow for for her mass. Please call pt to discuss. Contact [phone].

## 2023-01-12 ENCOUNTER — TELEPHONE (OUTPATIENT)
Dept: SURGERY | Age: 68
End: 2023-01-12

## 2023-01-12 PROBLEM — R93.5 ABNORMAL CT OF THE ABDOMEN: Status: ACTIVE | Noted: 2023-01-04

## 2023-01-12 NOTE — TELEPHONE ENCOUNTER
Called and spoke to Malissa, offering tentative surgery date of 1/30. She states this date will work.

## 2023-01-17 ENCOUNTER — OFFICE VISIT (OUTPATIENT)
Dept: SURGERY | Age: 68
End: 2023-01-17
Payer: COMMERCIAL

## 2023-01-17 VITALS
DIASTOLIC BLOOD PRESSURE: 77 MMHG | WEIGHT: 213 LBS | BODY MASS INDEX: 37.74 KG/M2 | HEART RATE: 74 BPM | HEIGHT: 63 IN | TEMPERATURE: 98 F | SYSTOLIC BLOOD PRESSURE: 146 MMHG

## 2023-01-17 DIAGNOSIS — D3A.8 NEUROENDOCRINE TUMOR: ICD-10-CM

## 2023-01-17 DIAGNOSIS — E66.01 CLASS 2 SEVERE OBESITY DUE TO EXCESS CALORIES WITH SERIOUS COMORBIDITY AND BODY MASS INDEX (BMI) OF 37.0 TO 37.9 IN ADULT (HCC): ICD-10-CM

## 2023-01-17 DIAGNOSIS — K86.89 PANCREATIC MASS: Primary | ICD-10-CM

## 2023-01-17 PROCEDURE — 3074F SYST BP LT 130 MM HG: CPT | Performed by: SURGERY

## 2023-01-17 PROCEDURE — 3078F DIAST BP <80 MM HG: CPT | Performed by: SURGERY

## 2023-01-17 PROCEDURE — 1123F ACP DISCUSS/DSCN MKR DOCD: CPT | Performed by: SURGERY

## 2023-01-17 PROCEDURE — 99214 OFFICE O/P EST MOD 30 MIN: CPT | Performed by: SURGERY

## 2023-01-18 ENCOUNTER — OFFICE VISIT (OUTPATIENT)
Dept: BARIATRICS/WEIGHT MGMT | Age: 68
End: 2023-01-18
Payer: COMMERCIAL

## 2023-01-18 VITALS
WEIGHT: 212 LBS | BODY MASS INDEX: 37.56 KG/M2 | SYSTOLIC BLOOD PRESSURE: 123 MMHG | DIASTOLIC BLOOD PRESSURE: 76 MMHG | HEART RATE: 76 BPM | HEIGHT: 63 IN

## 2023-01-18 DIAGNOSIS — Z98.84 LAP-BAND SURGERY STATUS: ICD-10-CM

## 2023-01-18 DIAGNOSIS — R11.2 INTRACTABLE NAUSEA AND VOMITING: Primary | ICD-10-CM

## 2023-01-18 DIAGNOSIS — R13.14 PHARYNGOESOPHAGEAL DYSPHAGIA: ICD-10-CM

## 2023-01-18 PROCEDURE — 3078F DIAST BP <80 MM HG: CPT | Performed by: SURGERY

## 2023-01-18 PROCEDURE — 3075F SYST BP GE 130 - 139MM HG: CPT | Performed by: SURGERY

## 2023-01-18 PROCEDURE — 99204 OFFICE O/P NEW MOD 45 MIN: CPT | Performed by: SURGERY

## 2023-01-18 PROCEDURE — 1123F ACP DISCUSS/DSCN MKR DOCD: CPT | Performed by: SURGERY

## 2023-01-19 ENCOUNTER — OFFICE VISIT (OUTPATIENT)
Dept: FAMILY MEDICINE CLINIC | Age: 68
End: 2023-01-19
Payer: COMMERCIAL

## 2023-01-19 VITALS
WEIGHT: 209 LBS | OXYGEN SATURATION: 99 % | DIASTOLIC BLOOD PRESSURE: 78 MMHG | BODY MASS INDEX: 37.03 KG/M2 | HEART RATE: 78 BPM | SYSTOLIC BLOOD PRESSURE: 140 MMHG | HEIGHT: 63 IN

## 2023-01-19 DIAGNOSIS — D47.2 SMOLDERING MULTIPLE MYELOMA: ICD-10-CM

## 2023-01-19 DIAGNOSIS — J45.909 MILD ASTHMA WITHOUT COMPLICATION, UNSPECIFIED WHETHER PERSISTENT: Chronic | ICD-10-CM

## 2023-01-19 DIAGNOSIS — Z01.818 PREOP EXAMINATION: Primary | ICD-10-CM

## 2023-01-19 DIAGNOSIS — E11.9 CONTROLLED TYPE 2 DIABETES MELLITUS WITHOUT COMPLICATION, WITHOUT LONG-TERM CURRENT USE OF INSULIN (HCC): ICD-10-CM

## 2023-01-19 DIAGNOSIS — Z98.84 STATUS POST GASTRIC BANDING: Chronic | ICD-10-CM

## 2023-01-19 DIAGNOSIS — E78.5 HYPERLIPIDEMIA, UNSPECIFIED HYPERLIPIDEMIA TYPE: Chronic | ICD-10-CM

## 2023-01-19 DIAGNOSIS — G47.33 OBSTRUCTIVE SLEEP APNEA: Chronic | ICD-10-CM

## 2023-01-19 DIAGNOSIS — K86.89 PANCREATIC MASS: ICD-10-CM

## 2023-01-19 DIAGNOSIS — E55.9 VITAMIN D DEFICIENCY: ICD-10-CM

## 2023-01-19 DIAGNOSIS — J30.2 SEASONAL ALLERGIC RHINITIS, UNSPECIFIED TRIGGER: Chronic | ICD-10-CM

## 2023-01-19 DIAGNOSIS — I10 PRIMARY HYPERTENSION: ICD-10-CM

## 2023-01-19 PROCEDURE — 99213 OFFICE O/P EST LOW 20 MIN: CPT | Performed by: FAMILY MEDICINE

## 2023-01-19 PROCEDURE — 3077F SYST BP >= 140 MM HG: CPT | Performed by: FAMILY MEDICINE

## 2023-01-19 PROCEDURE — 3078F DIAST BP <80 MM HG: CPT | Performed by: FAMILY MEDICINE

## 2023-01-19 NOTE — PROGRESS NOTES
Chief Complaint:     Tristian Red is a 74 y.o. female who presents for a preoperative physical examination. She is scheduled to have pancreatic mass excision  done by Dr. Raymundo Larry  at [facility]. on 1/30/23 with Dr. Sagrario Reddy doing removal of Lap band . History of Present Illness:      She denies abdominal pain . Pancreatic mass was seen on imaging and biopsy showed neuroendocrine tumor. She is followed by Dr. Goldie Mackey for smoldering MM. Patient denies chest pain, palpitations, or shortness of breath . No personal or family history of bleeding, clotting, or anesthesia problems. Past Medical History:   Diagnosis Date    Asthma     Elevated liver function tests 10/21/2016    Essential hypertension 12/12/2016    GERD (gastroesophageal reflux disease)     History of cystocele 9/12/2016    Hyperglycemia 10/21/2016    Hyperlipidemia     Hypertension     Hypothyroidism 10/21/2016    Migraine     Morbid obesity due to excess calories ([address] 10/21/2016    Reina's neuroma of left foot 10/21/2016    Non morbid obesity due to excess calories 9/12/2016    Obesity     Obstructive sleep apnea 10/21/2016    Osteoarthritis     Prediabetes 10/21/2016    Rectocele 9/12/2016    Seasonal allergic rhinitis 12/12/2016    Snoring 10/21/2016    Status post gastric banding 4/26/2010    Uterovaginal prolapse 9/12/2016        Review of patient's past surgical history indicates:     Past Surgical History:   Procedure Laterality Date    ABDOMINOPLASTY  1996    [place], [address]  09/21/2012    Dr. Harris Sena, [address]      COLONOSCOPY  12/04/2013    Dr. Lynn Coronado.  [place]    CYSTOSCOPY  09/21/2012    Dr. Janessa Belle Left 10/25/2016    Dr. Gayathri Troncoso Left 01/2020    HYSTERECTOMY, VAGINAL  09/21/2012    Dr. Michela Ball    LAP BAND  01/15/2010    Dr. De Leon Favor Left 07/26/2010    Dr. Chirag Jj - incisional biopsy of left tonsillar mass    [address] GASTROIN[address]INAL ENDOSCOPY  01/23/2015    Dr. Yashira Garcia  10/16/2010    Dr. Flowers Left    work related injury, removed a bone                                                   Current Outpatient Medications   Medication Sig Dispense Refill    Cholecalciferol 50 MCG (2000 UT) CAPS Take 50 mcg by mouth daily      amLODIPine-atorvastatatin (CADUET) 5-10 MG per tablet Take 20 mg by mouth      fexofenadine (ALLEGRA) 180 MG tablet Take 180 mg by mouth      amLODIPine-atorvastatatin (CADUET) 5-10 MG per tablet Take 20 mg by mouth      fexofenadine (ALLEGRA) 180 MG tablet Take 180 mg by mouth      PREVIDENT 5000 SENSITIVE 1.1-5 % GEL       omeprazole (PRILOSEC) 40 MG delayed release capsule TAKE 1 CAPSULE BY MOUTH EVERY MORNING BEFORE BREAKFAST 30 capsule 1    albuterol sulfate HFA (PROVENTIL;VENTOLIN;PROAIR) 108 (90 Base) MCG/ACT inhaler INHALE 2 PUFFS INTO THE LUNGS EVERY 6 HOURS AS NEEDED FOR WHEEZING OR SHORTNESS OF BREATH 20.1 g 1    levothyroxine (SYNTHROID) 100 MCG tablet TAKE 1 TABLET BY MOUTH ONE TIME A DAY 90 tablet 1    losartan (COZAAR) 50 MG tablet Take 1 tablet by mouth daily 90 tablet 1    metFORMIN (GLUCOPHAGE) 500 MG tablet Take 1 tablet by mouth in the morning and 1 tablet in the evening. Take with meals. 180 tablet 1    ONETOUCH ULTRA strip TEST 1 TIME A DAY AND AS NEEDED FOR SYMPTOMS OF FLUCTUATING GLUCOSE 300 strip 3    blood glucose monitor kit and supplies Check Blood sugars once daily and as needed. Include: monitor, strips, lancing device, lancets, control solutions, alcohol swabs.  1 kit 0    Lancets MISC Test 1 times a day & as needed for symptoms of fluctuating blood glucose. Dispense sufficient amount . 100 each 5    blood glucose monitor strips Test 1 times a day & as needed for symptoms of fluctuating blood glucose. Dispense sufficient amount . 100 strip 5    ONETOUCH VERIO strip 1 each by In Vitro route 2 times daily 100 each 3    ONETOUCH DELICA LANCETS 08I MISC 1 each by Does not apply route 2 times daily 60 each 5    Blood Glucose Monitoring Suppl ([address]) w/Device KIT 1 Device by Does not apply route once for 1 dose 1 kit 0     No current facility-administered medications for this visit. Allergies   Allergen Reactions    Latex Rash    Fruit & Vegetable Daily [Nutritional Supplements] Other (See Comments)     Vomiting, onion, tomato, watermelon, peach    Kiwi Extract      LIPS ITCH    Lisinopril Other (See Comments)     Throat tightness.     Other Itching     Itchy lips and mouth with fruits-watermelon, peaches,meloln, avocado       Social History     Tobacco Use    Smoking status: Never    Smokeless tobacco: Never   Vaping Use    Vaping Use: Never used   Substance Use Topics    Alcohol use: No    Drug use: No        Family History   Problem Relation Age of Onset    Cancer Mother     Diabetes Father     High Blood Pressure Father     High Cholesterol Father     Heart Disease Father     Other Sister         Brain tumor Surgery-didn't wake up    Breast Cancer Sister 64    No Known Problems Sister     No Known Problems Sister     Diabetes Brother     Cancer Brother         cancer    Cancer Brother         bone    No Known Problems Brother     No Known Problems Brother     No Known Problems Brother     No Known Problems Brother     No Known Problems Brother     No Known Problems Daughter     No Known Problems Daughter     No Known Problems Daughter     No Known Problems Daughter     No Known Problems Daughter     No Known Problems Son     Asthma Other     Uterine Cancer Other     Heart Disease Other     Depression Other Diabetes Other     High Cholesterol Other     High Blood Pressure Other     Kidney Disease Other     Migraines Other     Stroke Other     Arthritis Other         Review Of Systems    Skin: no abnormal pigmentation, rash, scaling, itching, masses, hair or nail changes  Eyes: negative  Ears/Nose/Throat: negative  Respiratory: negative  Cardiovascular: negative  Gastrointestinal: negative  Genitourinary: negative  Musculoskeletal: negative  Neurologic: negative  Psychiatric: negative  Hematologic/Lymphatic/Immunologic: negative  Endocrine: negative       Objective:      BP (!) 140/78 (Site: Left Upper Arm, Position: Sitting, Cuff Size: Medium Adult)   Pulse 78   Ht 5' 3\" (1.6 m)   Wt 209 lb (94.8 kg)   SpO2 99%   BMI 37.02 kg/m²   General appearance - healthy, alert, no distress  Skin - Skin color, texture, turgor normal. No rashes or lesions. Head - Normocephalic. No masses, lesions, tenderness or abnormalities  Eyes - conjunctivae/corneas clear. PERRLA, EOM's intact. Ears - External ears normal. Canals clear. TM's normal.  Nose/Sinuses - Nares normal. Septum midline. Mucosa normal. No drainage or sinus tenderness. Oropharynx - Lips, mucosa, and tongue normal. Teeth and gums normal. Oropharynx  clear  Neck - Neck supple. No adenopathy. Thyroid symmetric, normal size,  Back - Back symmetric, no curvature. ROM normal. No CVA tenderness. Lungs - Percussion normal. Good diaphragmatic excursion. Lungs clear  Heart - Regular rate and rhythm, with no rub, murmur or gallop noted. No edema. Abdomen - Abdomen soft, non-tender. BS normal. No masses, organomegaly  Extremities - Extremities normal. No deformities, edema, or skin discoloration. Musculoskeletal - Spine ROM normal. Muscular strength intact. Peripheral pulses - radial=4/4,, femoral=4/4, popliteal=4/4, dorsalis pedis=4/4,  Neuro - Gait normal. Reflexes normal and symmetric.  Sensation grossly normal.  No focal weakness    EKG: normal EKG, normal sinus rhythm, unchanged from previous tracings, nonspecific ST and T waves changes, Normal EKG, normal sinus rhythm - from 1/3./23. Assessment:        Per encounter diagnoses  She is medically cleared for surgery and anesthesia. Avoid Aspirin, non steroidal anti inflammatory medications, including Motrin, Aleve, Ibuprofen, Advil; multi vitamins, Vitamin E, omega 3 fish oil, and glucosamine chondroitin for the 7 days prior to surgery. 1. Preop examination  - Medically cleared. Prior labs were done. 2. Pancreatic mass  - Scheduled for surgery. 3. Status post gastric banding  - Stable. 4. Primary hypertension  - Controlled. Hold Losartan 50 mg the night before surgery. - Continue Amlodipine/ Atorvastatin 5/10 mg qd and low sodium diet. 5. Controlled type 2 diabetes mellitus without complication, without long-term current use of insulin ([facility]  - [id number]=1.9 in 11/22. Controlled. - Hold Metformin the night before and am of surgery. - Continue dietary/ lifestyle modifications, including decreased concentrated sweets and carbohydrates. 6. Hyperlipidemia, unspecified hyperlipidemia type  - Controlled . Continue Lipitor 10 mg qd and low cholesterol diet. 7. Mild asthma without complication, unspecified whether persistent  - Stable. Albuterol HFA prn.     8. Obstructive sleep apnea  - Not using CPAP currently. 9. Smoldering multiple myeloma  - Followed by Heme/Onc- Dr. Hayley Sterling. 10. Vitamin D deficiency  - Stable. 11. Seasonal allergic rhinitis, unspecified trigger  - Stable. Plan:          Per operating surgeon. See also orders filed with this encounter, if any.

## 2023-01-20 ENCOUNTER — TELEPHONE (OUTPATIENT)
Dept: SURGERY | Age: 68
End: 2023-01-20

## 2023-01-20 NOTE — TELEPHONE ENCOUNTER
Pre-op Call     Pt aware of arrival and procedure time: Yes  Arrival time: 0600 1/30/23 for 0800 surgery      Surgical site and laterality confirmed: Yes    Reminded to be NPO after midnight: Yes  Pre-op H&P completed with PCP: yes  Lymphoscintigram scheduled if needed:  NA  Pre-op labs completed (if needed):   Yes  Blood thinning medications held for surgery: Not on any anticoagulation   Bowel prep reviewed: Yes   Has ride home from hospital: Yes       Instructed to call with any questions or concerns. Verbalized understanding.

## 2023-01-24 NOTE — PROGRESS NOTES
Anesthesia consult made from Dr. August Blizzard office per Sai Cardenas.   Request sent to anesthesia, Dr. Jaimee Gifford via email and awaiting response. -db

## 2023-01-24 NOTE — PROGRESS NOTES
[facility] PRE-SURGICAL TESTING INSTRUCTIONS                      PRIOR TO PROCEDURE DATE:    1. PLEASE FOLLOW ANY INSTRUCTIONS GIVEN TO YOU PER YOUR SURGEON. 2. Arrange for someone to drive you home and be with you for the first 24 hours after discharge for your safety after your procedure for which you received sedation. Ensure it is someone we can share information with regarding your discharge. NOTE: At this time ONLY 2 ADULTS may accompany you   One person ENCOURAGED to stay at hospital entire time if outpatient surgery      3. You must contact your surgeon for instructions IF:  You are taking any blood thinners, aspirin, anti-inflammatory or vitamins. There is a change in your physical condition such as a cold, fever, rash, cuts, sores, or any other infection, especially near your surgical site. 4. Do not drink alcohol the day before or day of your procedure. Do not use any recreational marijuana at least 24 hours or street drugs (heroin, cocaine) at minimum 5 days prior to your procedure. 5. A Pre-Surgical History and Physical MUST be completed WITHIN 30 DAYS OR LESS prior to your procedure. by your Physician or an Urgent Care        THE DAY OF YOUR PROCEDURE:  1. Follow instructions for ARRIVAL TIME as DIRECTED BY YOUR SURGEON. 2. Enter the MAIN entrance from [address] and follow the signs to the free Parking [employer] or [employer] (offered free of charge 7 am-5pm). 3. Enter the Main Entrance of the hospital (do not enter from the lower level of the parking garage). Upon entrance, check in with the  at the surgical information desk on your LEFT. Bring your insurance card and photo ID to register      4. DO NOT EAT ANYTHING 8 hours prior to arrival for surgery. You may have up to 8 ounces of water 4 hours prior to your arrival for surgery.    NOTE: ALL Gastric, Bariatric & Bowel surgery patients - you MUST follow your surgeon's instructions regarding eating/ drinking as you will have very specific instructions to follow. If you did not receive these, call your surgeon's office immediately. 5. MEDICATIONS:  Take the following medications with a SMALL sip of water: BRING ALBUTEROL INHALER AND MAY TAKE THYROID MEDICATION WITH SIP OF WATER DOS  Use your usual dose of inhalers the morning of surgery. BRING your rescue inhaler with you to hospital.   Anesthesia does NOT want you to take insulin the morning of surgery. They will control your blood sugar while you are at the hospital. Please contact your ordering physician for instructions regarding your insulin the night before your procedure. If you have an insulin pump, please keep it set on basal rate. Bariatric patient's call your surgeon if on diabetic medications as some may need to be stopped 1 week prior to surgery    6. Do not swallow additional water when brushing teeth. No gum, candy, mints, or ice chips. Refrain from smoking or at least decrease the amount on day of surgery. 7. Morning of surgery:   Take a shower with an antibacterial soap (i.e., Safeguard or Dial) OR your physician may have instructed you to use Hibiclens. Dress in loose, comfortable clothing appropriate for redressing after your procedure. Do not wear jewelry (including body piercings), make-up (especially NO eye make-up), fingernail polish (NO toenail polish if foot/leg surgery), lotion, powders, or metal hairclips. Do not shave or wax for 72 hours prior to procedure near your operative site. Shaving with a razor can irritate your skin and make it easier to develop an infection. On the day of your procedure, any hair that needs to be removed near the surgical site will be 'clipped' by a healthcare worker using a special clipper designed to avoid skin irritation. 8. Dentures, glasses, or contacts will need to be removed before your procedure.  Bring cases for your glasses, contacts, dentures, or hearing aids to protect them while you are in surgery. 9. If you use a CPAP, please bring it with you on the day of your procedure. 10. We recommend that valuable personal belongings such as cash, cell phones, e-tablets, or jewelry, be left at home during your stay. The hospital will not be responsible for valuables that are not secured in the hospital safe. However, if your insurance requires a co-pay, you may want to bring a method of payment, i.e., Check or credit card, if you wish to pay your co-pay the day of surgery. 11. If you are to stay overnight, you may bring a bag with personal items. Please have any large items you may need brought in by your family after your arrival to your hospital room. 12. If you have a Living Will or Durable Power of , please bring a copy on the day of your procedure. How we keep you safe and work to prevent surgical site infections:   1. Health care workers should always check your ID bracelet to verify your name and birth date. You will be asked many times to state your name, date of birth, and allergies. 2. Health care workers should always clean their hands with soap or alcohol gel before providing care to you. It is okay to ask anyone if they cleaned their hands before they touch you. 3. You will be actively involved in verifying the type of procedure you are having and ensuring the correct surgical site. This will be confirmed multiple times prior to your procedure. Do NOT maria d your surgery site UNLESS instructed to by your surgeon. 4. When you are in the operating room, your surgical site will be cleansed with a special soap, and in most cases, you will be given an antibiotic before the surgery begins. What to expect AFTER your procedure? 1. Immediately following your procedure, your will be taken to the PACU for the first phase of your recovery.   Your nurse will help you recover from any potential side effects of anesthesia, such as extreme drowsiness, changes in your vital signs or breathing patterns. Nausea, headache, muscle aches, or sore throat may also occur after anesthesia. Your nurse will help you manage these potential side effects. 2. For comfort and safety, arrange to have someone at home with you for the first 24 hours after discharge. 3. You and your family will be given written instructions about your diet, activity, dressing care, medications, and return visits. 4. Once at home, should issues with nausea, pain, or bleeding occur, or should you notice any signs of infection, you should call your surgeon. 5. Always clean your hands before and after caring for your wound. Do not let your family touch your surgery site without cleaning their hands. 6. Narcotic pain medications can cause significant constipation. You may want to add a stool softener to your postoperative medication schedule or speak to your surgeon on how best to manage this SIDE EFFECT. SPECIAL INSTRUCTIONS BRING ALBUTEROL INHALER    Thank you for allowing us to care for you. We strive to exceed your expectations in the delivery of care and service provided to you and your family. If you need to [facility] the [facility] [address] staff for any reason, please call us at [phone]    Instructions reviewed with patient during preadmission testing phone interview.   Clarissa Orantes RN.1/24/2023 .10:53 AM      ADDITIONAL EDUCATIONAL INFORMATION REVIEWED PER PHONE WITH YOU AND/OR YOUR FAMILY:  Yes Hibiclens® Bathing Instructions

## 2023-01-24 NOTE — PROGRESS NOTES
Place patient label inside box (if no patient label, complete below)  Name:    :  MR#:   Segun Gum / PROCEDURE  I (we)Bella (Patient Name) authorize Tamia Murguia MD  (Provider / Silvina Prince) and/or such assistants as may be selected by him/her, to perform the following operation/procedure(s): ROBOTIC/LAPAROSCOPIC DISTAL PANCREATECTOMY, POSSIBLE SPLENETOMY, POSSIBLE OPEN       Note: If unable to obtain consent prior to an emergent procedure, document the emergent reason in the medical record. This procedure has been explained to my (our) satisfaction and included in the explanation was: The intended benefit, nature, and extent of the procedure to be performed; The significant risks involved and the probability of success; Alternative procedures and methods of treatment; The dangers and probable consequences of such alternatives (including no procedure or treatment); The expected consequences of the procedure on my future health; Whether other qualified individuals would be performing important surgical tasks and/or whether  would be present to advise or support the procedure. I (we) understand that there are other risks of infection and other serious complications in the pre-operative/procedural and postoperative/procedural stages of my (our) care. I (we) have asked all of the questions which I (we) thought were important in deciding whether or not to undergo treatment or diagnosis. These questions have been answered to my (our) satisfaction. I (we) understand that no assurance can be given that the procedure will be a success, and no guarantee or warranty of success has been given to me (us).     It has been explained to me (us) that during the course of the operation/procedure, unforeseen conditions may be revealed that necessitate extension of the original procedure(s) or different procedure(s) than those set forth in Paragraph 1. I (we) authorize and request that the above-named physician, his/her assistants or his/her designees, perform procedures as necessary and desirable if deemed to be in my (our) best interest.     Revised 8/2/2021                                                                          Page 1 of 2       I acknowledge that health care personnel may be observing this procedure for the purpose of medical education or other specified purposes as may be necessary as requested and/or approved by my (our) physician. I (we) consent to the disposal by the hospital Pathologist of the removed tissue, parts or organs in accordance with hospital policy. I do ____ do not ____ consent to the use of a local infiltration pain blocking agent that will be used by my provider/surgical provider to help alleviate pain during my procedure. I do ____ do not ____ consent to an emergent blood transfusion in the case of a life-threatening situation that requires blood components to be administered. This consent is valid for 24 hours from the beginning of the procedure. This patient does ____ or does not ____ currently have a DNR status/order. If DNR order is in place, obtain Addendum to the Surgical Consent for ALL Patients with a DNR Order to address sumit-operative status for limited intervention or DNR suspension.      I have read and fully understand the above Consent for Operation/Procedure and that all blanks were completed before I signed the consent.   _____________________________       _____________________      ____/____am/pm  Signature of Patient or legal representative      Printed Name / Relationship            Date / Time   ____________________________       _____________________      ____/____am/pm  Witness to Signature                                    Printed Name                    Date / Time    If patient is unable to sign or is a minor, complete the following)  Patient is a minor, ____ years of age, or unable to sign because:   ______________________________________________________________________________________________    If a phone consent is obtained, consent will be documented by using two health care professionals, each affirming that the consenting party has no questions and gives consent for the procedure discussed with the physician/provider.   _____________________          ____________________       _____/_____am/pm   2nd witness to phone consent        Printed name           Date / Time    Informed Consent:  I have provided the explanation described above in section 1 to the patient and/or legal representative.  I have provided the patient and/or legal representative with an opportunity to ask any questions about the proposed operation/procedure.   ___________________________          ____________________         ____/____am/pm  Provider / Proceduralist                            Printed name            Date / Time  Revised 8/2/2021                                                                      Page 2 of 2

## 2023-01-27 ENCOUNTER — ANESTHESIA EVENT (OUTPATIENT)
Dept: OPERATING ROOM | Age: 68
DRG: 830 | End: 2023-01-27
Payer: COMMERCIAL

## 2023-01-30 ENCOUNTER — ANESTHESIA (OUTPATIENT)
Dept: OPERATING ROOM | Age: 68
DRG: 830 | End: 2023-01-30
Payer: COMMERCIAL

## 2023-01-30 ENCOUNTER — HOSPITAL ENCOUNTER (INPATIENT)
Age: 68
LOS: 3 days | Discharge: HOME | DRG: 830 | End: 2023-02-02
Attending: SURGERY | Admitting: SURGERY
Payer: COMMERCIAL

## 2023-01-30 DIAGNOSIS — G89.18 ACUTE POSTOPERATIVE PAIN OF ABDOMEN: Primary | ICD-10-CM

## 2023-01-30 DIAGNOSIS — R10.9 ACUTE POSTOPERATIVE PAIN OF ABDOMEN: Primary | ICD-10-CM

## 2023-01-30 DIAGNOSIS — R11.2 NAUSEA AND VOMITING, UNSPECIFIED VOMITING TYPE: ICD-10-CM

## 2023-01-30 DIAGNOSIS — Z98.84 LAP-BAND SURGERY STATUS: ICD-10-CM

## 2023-01-30 DIAGNOSIS — K86.89 PANCREATIC MASS: ICD-10-CM

## 2023-01-30 LAB
A/G RATIO: 0.6 (ref 1.1–2.2)
ABO/RH: NORMAL
ALBUMIN SERPL-MCNC: 3.5 G/DL (ref 3.4–5)
ALP BLD-CCNC: 75 U/L (ref 40–129)
ALT SERPL-CCNC: 15 U/L (ref 10–40)
ANION GAP SERPL CALCULATED.3IONS-SCNC: 10 MMOL/L (ref 3–16)
ANTIBODY SCREEN: NORMAL
AST SERPL-CCNC: 22 U/L (ref 15–37)
BILIRUB SERPL-MCNC: 0.6 MG/DL (ref 0–1)
BUN BLDV-MCNC: 12 MG/DL (ref 7–20)
CALCIUM SERPL-MCNC: 9.8 MG/DL (ref 8.3–10.6)
CHLORIDE BLD-SCNC: 100 MMOL/L (ref 99–110)
CO2: 25 MMOL/L (ref 21–32)
CREAT SERPL-MCNC: 0.5 MG/DL (ref 0.6–1.2)
GFR SERPL CREATININE-BSD FRML MDRD: >60 ML/MIN/{1.73_M2}
GLUCOSE BLD-MCNC: 101 MG/DL (ref 70–99)
GLUCOSE BLD-MCNC: 102 MG/DL (ref 70–99)
GLUCOSE BLD-MCNC: 143 MG/DL (ref 70–99)
HCT VFR BLD CALC: 30.5 % (ref 36–48)
HEMOGLOBIN: 10.5 G/DL (ref 12–16)
MCH RBC QN AUTO: 31 PG (ref 26–34)
MCHC RBC AUTO-ENTMCNC: 34.4 G/DL (ref 31–36)
MCV RBC AUTO: 90.2 FL (ref 80–100)
PDW BLD-RTO: 14.1 % (ref 12.4–15.4)
PERFORMED ON: ABNORMAL
PERFORMED ON: ABNORMAL
PLATELET # BLD: 204 K/UL (ref 135–450)
PMV BLD AUTO: 9.1 FL (ref 5–10.5)
POTASSIUM SERPL-SCNC: 3.3 MMOL/L (ref 3.5–5.1)
RBC # BLD: 3.38 M/UL (ref 4–5.2)
SODIUM BLD-SCNC: 135 MMOL/L (ref 136–145)
TOTAL PROTEIN: 9.4 G/DL (ref 6.4–8.2)
WBC # BLD: 5.3 K/UL (ref 4–11)

## 2023-01-30 PROCEDURE — 80053 COMPREHEN METABOLIC PANEL: CPT

## 2023-01-30 PROCEDURE — 49905 OMENTAL FLAP INTRA-ABDOM: CPT | Performed by: SURGERY

## 2023-01-30 PROCEDURE — S2900 ROBOTIC SURGICAL SYSTEM: HCPCS | Performed by: SURGERY

## 2023-01-30 PROCEDURE — 88300 SURGICAL PATH GROSS: CPT

## 2023-01-30 PROCEDURE — 7100000000 HC PACU RECOVERY - FIRST 15 MIN: Performed by: SURGERY

## 2023-01-30 PROCEDURE — 88360 TUMOR IMMUNOHISTOCHEM/MANUAL: CPT

## 2023-01-30 PROCEDURE — 3700000001 HC ADD 15 MINUTES (ANESTHESIA): Performed by: SURGERY

## 2023-01-30 PROCEDURE — 2580000003 HC RX 258: Performed by: ANESTHESIOLOGY

## 2023-01-30 PROCEDURE — 48120 REMOVAL OF PANCREAS LESION: CPT | Performed by: SURGERY

## 2023-01-30 PROCEDURE — 2500000003 HC RX 250 WO HCPCS: Performed by: SURGERY

## 2023-01-30 PROCEDURE — 3600000019 HC SURGERY ROBOT ADDTL 15MIN: Performed by: SURGERY

## 2023-01-30 PROCEDURE — 0FBG4ZZ EXCISION OF PANCREAS, PERCUTANEOUS ENDOSCOPIC APPROACH: ICD-10-PCS | Performed by: SURGERY

## 2023-01-30 PROCEDURE — 2500000003 HC RX 250 WO HCPCS: Performed by: NURSE ANESTHETIST, CERTIFIED REGISTERED

## 2023-01-30 PROCEDURE — 86900 BLOOD TYPING SEROLOGIC ABO: CPT

## 2023-01-30 PROCEDURE — 07BD4ZX EXCISION OF AORTIC LYMPHATIC, PERCUTANEOUS ENDOSCOPIC APPROACH, DIAGNOSTIC: ICD-10-PCS | Performed by: SURGERY

## 2023-01-30 PROCEDURE — 86850 RBC ANTIBODY SCREEN: CPT

## 2023-01-30 PROCEDURE — 86901 BLOOD TYPING SEROLOGIC RH(D): CPT

## 2023-01-30 PROCEDURE — 2580000003 HC RX 258: Performed by: NURSE ANESTHETIST, CERTIFIED REGISTERED

## 2023-01-30 PROCEDURE — 2580000003 HC RX 258: Performed by: SURGERY

## 2023-01-30 PROCEDURE — 88341 IMHCHEM/IMCYTCHM EA ADD ANTB: CPT

## 2023-01-30 PROCEDURE — 6360000002 HC RX W HCPCS: Performed by: NURSE ANESTHETIST, CERTIFIED REGISTERED

## 2023-01-30 PROCEDURE — 0DP64CZ REMOVAL OF EXTRALUMINAL DEVICE FROM STOMACH, PERCUTANEOUS ENDOSCOPIC APPROACH: ICD-10-PCS | Performed by: SURGERY

## 2023-01-30 PROCEDURE — 2720000010 HC SURG SUPPLY STERILE: Performed by: SURGERY

## 2023-01-30 PROCEDURE — 88309 TISSUE EXAM BY PATHOLOGIST: CPT

## 2023-01-30 PROCEDURE — 43774 LAP RMVL GASTR ADJ ALL PARTS: CPT | Performed by: SURGERY

## 2023-01-30 PROCEDURE — 7100000001 HC PACU RECOVERY - ADDTL 15 MIN: Performed by: SURGERY

## 2023-01-30 PROCEDURE — 38570 LAPAROSCOPY LYMPH NODE BIOP: CPT | Performed by: SURGERY

## 2023-01-30 PROCEDURE — 2709999900 HC NON-CHARGEABLE SUPPLY: Performed by: SURGERY

## 2023-01-30 PROCEDURE — 6370000000 HC RX 637 (ALT 250 FOR IP): Performed by: SURGERY

## 2023-01-30 PROCEDURE — 3700000000 HC ANESTHESIA ATTENDED CARE: Performed by: SURGERY

## 2023-01-30 PROCEDURE — 88305 TISSUE EXAM BY PATHOLOGIST: CPT

## 2023-01-30 PROCEDURE — 6370000000 HC RX 637 (ALT 250 FOR IP)

## 2023-01-30 PROCEDURE — 85027 COMPLETE CBC AUTOMATED: CPT

## 2023-01-30 PROCEDURE — 6360000002 HC RX W HCPCS: Performed by: SURGERY

## 2023-01-30 PROCEDURE — 3600000009 HC SURGERY ROBOT BASE: Performed by: SURGERY

## 2023-01-30 PROCEDURE — A4217 STERILE WATER/SALINE, 500 ML: HCPCS | Performed by: SURGERY

## 2023-01-30 PROCEDURE — 6360000002 HC RX W HCPCS

## 2023-01-30 PROCEDURE — 1200000000 HC SEMI PRIVATE

## 2023-01-30 PROCEDURE — 6360000002 HC RX W HCPCS: Performed by: ANESTHESIOLOGY

## 2023-01-30 PROCEDURE — 88342 IMHCHEM/IMCYTCHM 1ST ANTB: CPT

## 2023-01-30 RX ORDER — SODIUM CHLORIDE 9 MG/ML
INJECTION, SOLUTION INTRAVENOUS PRN
Status: DISCONTINUED | OUTPATIENT
Start: 2023-01-30 | End: 2023-01-30 | Stop reason: HOSPADM

## 2023-01-30 RX ORDER — MAGNESIUM HYDROXIDE 1200 MG/15ML
LIQUID ORAL CONTINUOUS PRN
Status: DISCONTINUED | OUTPATIENT
Start: 2023-01-30 | End: 2023-01-30 | Stop reason: HOSPADM

## 2023-01-30 RX ORDER — ONDANSETRON 4 MG/1
4 TABLET, ORALLY DISINTEGRATING ORAL EVERY 8 HOURS PRN
Status: DISCONTINUED | OUTPATIENT
Start: 2023-01-30 | End: 2023-02-02 | Stop reason: HOSPADM

## 2023-01-30 RX ORDER — HYDRALAZINE HYDROCHLORIDE 20 MG/ML
10 INJECTION INTRAMUSCULAR; INTRAVENOUS
Status: DISCONTINUED | OUTPATIENT
Start: 2023-01-30 | End: 2023-01-30 | Stop reason: HOSPADM

## 2023-01-30 RX ORDER — LEVOTHYROXINE SODIUM 0.1 MG/1
100 TABLET ORAL DAILY
Status: DISCONTINUED | OUTPATIENT
Start: 2023-01-31 | End: 2023-02-02 | Stop reason: HOSPADM

## 2023-01-30 RX ORDER — ACETAMINOPHEN 325 MG/1
650 TABLET ORAL EVERY 6 HOURS
Status: DISCONTINUED | OUTPATIENT
Start: 2023-01-30 | End: 2023-01-31

## 2023-01-30 RX ORDER — OXYCODONE HYDROCHLORIDE 5 MG/1
10 TABLET ORAL EVERY 4 HOURS PRN
Status: DISCONTINUED | OUTPATIENT
Start: 2023-01-30 | End: 2023-01-31

## 2023-01-30 RX ORDER — SUCCINYLCHOLINE/SOD CL,ISO/PF 200MG/10ML
SYRINGE (ML) INTRAVENOUS PRN
Status: DISCONTINUED | OUTPATIENT
Start: 2023-01-30 | End: 2023-01-30 | Stop reason: SDUPTHER

## 2023-01-30 RX ORDER — ONDANSETRON 2 MG/ML
4 INJECTION INTRAMUSCULAR; INTRAVENOUS EVERY 6 HOURS PRN
Status: DISCONTINUED | OUTPATIENT
Start: 2023-01-30 | End: 2023-02-02 | Stop reason: HOSPADM

## 2023-01-30 RX ORDER — METRONIDAZOLE 500 MG/100ML
500 INJECTION, SOLUTION INTRAVENOUS EVERY 8 HOURS
Status: COMPLETED | OUTPATIENT
Start: 2023-01-30 | End: 2023-01-31

## 2023-01-30 RX ORDER — AMLODIPINE BESYLATE 5 MG/1
5 TABLET ORAL NIGHTLY
Status: DISCONTINUED | OUTPATIENT
Start: 2023-01-30 | End: 2023-02-02 | Stop reason: HOSPADM

## 2023-01-30 RX ORDER — SODIUM CHLORIDE, SODIUM LACTATE, POTASSIUM CHLORIDE, CALCIUM CHLORIDE 600; 310; 30; 20 MG/100ML; MG/100ML; MG/100ML; MG/100ML
INJECTION, SOLUTION INTRAVENOUS CONTINUOUS
Status: DISCONTINUED | OUTPATIENT
Start: 2023-01-30 | End: 2023-01-30 | Stop reason: HOSPADM

## 2023-01-30 RX ORDER — ONDANSETRON 2 MG/ML
INJECTION INTRAMUSCULAR; INTRAVENOUS PRN
Status: DISCONTINUED | OUTPATIENT
Start: 2023-01-30 | End: 2023-01-30 | Stop reason: SDUPTHER

## 2023-01-30 RX ORDER — SODIUM CHLORIDE, SODIUM LACTATE, POTASSIUM CHLORIDE, CALCIUM CHLORIDE 600; 310; 30; 20 MG/100ML; MG/100ML; MG/100ML; MG/100ML
INJECTION, SOLUTION INTRAVENOUS CONTINUOUS PRN
Status: DISCONTINUED | OUTPATIENT
Start: 2023-01-30 | End: 2023-01-30 | Stop reason: SDUPTHER

## 2023-01-30 RX ORDER — OXYCODONE HYDROCHLORIDE 5 MG/1
5 TABLET ORAL EVERY 4 HOURS PRN
Status: DISCONTINUED | OUTPATIENT
Start: 2023-01-30 | End: 2023-01-31

## 2023-01-30 RX ORDER — SODIUM CHLORIDE 0.9 % (FLUSH) 0.9 %
10 SYRINGE (ML) INJECTION EVERY 12 HOURS SCHEDULED
Status: DISCONTINUED | OUTPATIENT
Start: 2023-01-30 | End: 2023-02-02 | Stop reason: HOSPADM

## 2023-01-30 RX ORDER — MIDAZOLAM HYDROCHLORIDE 1 MG/ML
INJECTION INTRAMUSCULAR; INTRAVENOUS PRN
Status: DISCONTINUED | OUTPATIENT
Start: 2023-01-30 | End: 2023-01-30 | Stop reason: SDUPTHER

## 2023-01-30 RX ORDER — PROPOFOL 10 MG/ML
INJECTION, EMULSION INTRAVENOUS PRN
Status: DISCONTINUED | OUTPATIENT
Start: 2023-01-30 | End: 2023-01-30 | Stop reason: SDUPTHER

## 2023-01-30 RX ORDER — OCTREOTIDE ACETATE 100 UG/ML
100 INJECTION, SOLUTION INTRAVENOUS; SUBCUTANEOUS EVERY 8 HOURS
Status: COMPLETED | OUTPATIENT
Start: 2023-01-30 | End: 2023-02-02

## 2023-01-30 RX ORDER — MEPERIDINE HYDROCHLORIDE 25 MG/ML
12.5 INJECTION INTRAMUSCULAR; INTRAVENOUS; SUBCUTANEOUS EVERY 5 MIN PRN
Status: DISCONTINUED | OUTPATIENT
Start: 2023-01-30 | End: 2023-01-30 | Stop reason: HOSPADM

## 2023-01-30 RX ORDER — OXYCODONE HYDROCHLORIDE 5 MG/1
5 TABLET ORAL
Status: DISCONTINUED | OUTPATIENT
Start: 2023-01-30 | End: 2023-01-30 | Stop reason: HOSPADM

## 2023-01-30 RX ORDER — ATORVASTATIN CALCIUM 10 MG/1
10 TABLET, FILM COATED ORAL NIGHTLY
Status: DISCONTINUED | OUTPATIENT
Start: 2023-01-30 | End: 2023-02-02 | Stop reason: HOSPADM

## 2023-01-30 RX ORDER — SODIUM CHLORIDE, SODIUM LACTATE, POTASSIUM CHLORIDE, CALCIUM CHLORIDE 600; 310; 30; 20 MG/100ML; MG/100ML; MG/100ML; MG/100ML
INJECTION, SOLUTION INTRAVENOUS CONTINUOUS
Status: DISCONTINUED | OUTPATIENT
Start: 2023-01-30 | End: 2023-02-01

## 2023-01-30 RX ORDER — PROCHLORPERAZINE EDISYLATE 5 MG/ML
5 INJECTION INTRAMUSCULAR; INTRAVENOUS
Status: DISCONTINUED | OUTPATIENT
Start: 2023-01-30 | End: 2023-01-30 | Stop reason: HOSPADM

## 2023-01-30 RX ORDER — FENTANYL CITRATE 50 UG/ML
INJECTION, SOLUTION INTRAMUSCULAR; INTRAVENOUS PRN
Status: DISCONTINUED | OUTPATIENT
Start: 2023-01-30 | End: 2023-01-30 | Stop reason: SDUPTHER

## 2023-01-30 RX ORDER — ENOXAPARIN SODIUM 100 MG/ML
40 INJECTION SUBCUTANEOUS DAILY
Status: DISCONTINUED | OUTPATIENT
Start: 2023-01-31 | End: 2023-02-02 | Stop reason: HOSPADM

## 2023-01-30 RX ORDER — ONDANSETRON 2 MG/ML
4 INJECTION INTRAMUSCULAR; INTRAVENOUS
Status: COMPLETED | OUTPATIENT
Start: 2023-01-30 | End: 2023-01-30

## 2023-01-30 RX ORDER — SODIUM CHLORIDE, SODIUM LACTATE, POTASSIUM CHLORIDE, AND CALCIUM CHLORIDE .6; .31; .03; .02 G/100ML; G/100ML; G/100ML; G/100ML
IRRIGANT IRRIGATION PRN
Status: DISCONTINUED | OUTPATIENT
Start: 2023-01-30 | End: 2023-01-30 | Stop reason: HOSPADM

## 2023-01-30 RX ORDER — SODIUM CHLORIDE 0.9 % (FLUSH) 0.9 %
10 SYRINGE (ML) INJECTION PRN
Status: DISCONTINUED | OUTPATIENT
Start: 2023-01-30 | End: 2023-02-02 | Stop reason: HOSPADM

## 2023-01-30 RX ORDER — METRONIDAZOLE 500 MG/100ML
500 INJECTION, SOLUTION INTRAVENOUS ONCE
Status: COMPLETED | OUTPATIENT
Start: 2023-01-30 | End: 2023-01-30

## 2023-01-30 RX ORDER — AMLODIPINE BESYLATE AND ATORVASTATIN CALCIUM 5; 10 MG/1; MG/1
1 TABLET, FILM COATED ORAL NIGHTLY
Status: DISCONTINUED | OUTPATIENT
Start: 2023-01-30 | End: 2023-01-30 | Stop reason: CLARIF

## 2023-01-30 RX ORDER — PANTOPRAZOLE SODIUM 40 MG/1
40 TABLET, DELAYED RELEASE ORAL
Status: DISCONTINUED | OUTPATIENT
Start: 2023-01-31 | End: 2023-02-02 | Stop reason: HOSPADM

## 2023-01-30 RX ORDER — SODIUM CHLORIDE 0.9 % (FLUSH) 0.9 %
5-40 SYRINGE (ML) INJECTION PRN
Status: DISCONTINUED | OUTPATIENT
Start: 2023-01-30 | End: 2023-01-30 | Stop reason: HOSPADM

## 2023-01-30 RX ORDER — LABETALOL HYDROCHLORIDE 5 MG/ML
10 INJECTION, SOLUTION INTRAVENOUS
Status: DISCONTINUED | OUTPATIENT
Start: 2023-01-30 | End: 2023-01-30 | Stop reason: HOSPADM

## 2023-01-30 RX ORDER — SODIUM CHLORIDE 9 MG/ML
INJECTION, SOLUTION INTRAVENOUS PRN
Status: DISCONTINUED | OUTPATIENT
Start: 2023-01-30 | End: 2023-02-02 | Stop reason: HOSPADM

## 2023-01-30 RX ORDER — ROCURONIUM BROMIDE 10 MG/ML
INJECTION, SOLUTION INTRAVENOUS PRN
Status: DISCONTINUED | OUTPATIENT
Start: 2023-01-30 | End: 2023-01-30 | Stop reason: SDUPTHER

## 2023-01-30 RX ORDER — ALBUTEROL SULFATE 2.5 MG/3ML
2.5 SOLUTION RESPIRATORY (INHALATION) EVERY 4 HOURS PRN
Status: DISCONTINUED | OUTPATIENT
Start: 2023-01-30 | End: 2023-02-02 | Stop reason: HOSPADM

## 2023-01-30 RX ORDER — SODIUM CHLORIDE 0.9 % (FLUSH) 0.9 %
5-40 SYRINGE (ML) INJECTION EVERY 12 HOURS SCHEDULED
Status: DISCONTINUED | OUTPATIENT
Start: 2023-01-30 | End: 2023-01-30 | Stop reason: HOSPADM

## 2023-01-30 RX ADMIN — SUGAMMADEX 200 MG: 100 INJECTION, SOLUTION INTRAVENOUS at 11:05

## 2023-01-30 RX ADMIN — Medication 140 MG: at 08:04

## 2023-01-30 RX ADMIN — FENTANYL CITRATE 50 MCG: 50 INJECTION, SOLUTION INTRAMUSCULAR; INTRAVENOUS at 08:38

## 2023-01-30 RX ADMIN — ONDANSETRON 4 MG: 2 INJECTION INTRAMUSCULAR; INTRAVENOUS at 17:28

## 2023-01-30 RX ADMIN — HYDROMORPHONE HYDROCHLORIDE 0.5 MG: 1 INJECTION, SOLUTION INTRAMUSCULAR; INTRAVENOUS; SUBCUTANEOUS at 12:00

## 2023-01-30 RX ADMIN — ROCURONIUM BROMIDE 50 MG: 10 INJECTION, SOLUTION INTRAVENOUS at 09:17

## 2023-01-30 RX ADMIN — FENTANYL CITRATE 100 MCG: 50 INJECTION, SOLUTION INTRAMUSCULAR; INTRAVENOUS at 08:01

## 2023-01-30 RX ADMIN — AMLODIPINE BESYLATE 5 MG: 5 TABLET ORAL at 23:00

## 2023-01-30 RX ADMIN — MIDAZOLAM HYDROCHLORIDE 2 MG: 2 INJECTION, SOLUTION INTRAMUSCULAR; INTRAVENOUS at 07:56

## 2023-01-30 RX ADMIN — SODIUM CHLORIDE, SODIUM LACTATE, POTASSIUM CHLORIDE, AND CALCIUM CHLORIDE: .6; .31; .03; .02 INJECTION, SOLUTION INTRAVENOUS at 07:56

## 2023-01-30 RX ADMIN — HYDROMORPHONE HYDROCHLORIDE 0.25 MG: 1 INJECTION, SOLUTION INTRAMUSCULAR; INTRAVENOUS; SUBCUTANEOUS at 16:01

## 2023-01-30 RX ADMIN — ATORVASTATIN CALCIUM 10 MG: 10 TABLET, FILM COATED ORAL at 22:58

## 2023-01-30 RX ADMIN — ONDANSETRON 4 MG: 2 INJECTION INTRAMUSCULAR; INTRAVENOUS at 08:14

## 2023-01-30 RX ADMIN — ONDANSETRON 4 MG: 2 INJECTION INTRAMUSCULAR; INTRAVENOUS at 11:48

## 2023-01-30 RX ADMIN — HYDROMORPHONE HYDROCHLORIDE 0.5 MG: 1 INJECTION, SOLUTION INTRAMUSCULAR; INTRAVENOUS; SUBCUTANEOUS at 11:45

## 2023-01-30 RX ADMIN — SODIUM CHLORIDE, POTASSIUM CHLORIDE, SODIUM LACTATE AND CALCIUM CHLORIDE: 600; 310; 30; 20 INJECTION, SOLUTION INTRAVENOUS at 12:41

## 2023-01-30 RX ADMIN — METRONIDAZOLE 500 MG: 500 INJECTION, SOLUTION INTRAVENOUS at 15:50

## 2023-01-30 RX ADMIN — Medication 100 MG: at 08:01

## 2023-01-30 RX ADMIN — SODIUM CHLORIDE 1000 MG: 900 INJECTION INTRAVENOUS at 07:56

## 2023-01-30 RX ADMIN — PROPOFOL 80 MG: 10 INJECTION, EMULSION INTRAVENOUS at 08:03

## 2023-01-30 RX ADMIN — OCTREOTIDE ACETATE 100 MCG: 100 INJECTION, SOLUTION INTRAVENOUS; SUBCUTANEOUS at 23:12

## 2023-01-30 RX ADMIN — METRONIDAZOLE 500 MG: 500 INJECTION, SOLUTION INTRAVENOUS at 08:03

## 2023-01-30 RX ADMIN — FENTANYL CITRATE 50 MCG: 50 INJECTION, SOLUTION INTRAMUSCULAR; INTRAVENOUS at 08:30

## 2023-01-30 RX ADMIN — HYDROMORPHONE HYDROCHLORIDE 0.5 MG: 1 INJECTION, SOLUTION INTRAMUSCULAR; INTRAVENOUS; SUBCUTANEOUS at 12:15

## 2023-01-30 RX ADMIN — ACETAMINOPHEN 650 MG: 325 TABLET ORAL at 16:02

## 2023-01-30 RX ADMIN — ROCURONIUM BROMIDE 100 MG: 10 INJECTION, SOLUTION INTRAVENOUS at 08:14

## 2023-01-30 RX ADMIN — HYDROMORPHONE HYDROCHLORIDE 0.5 MG: 1 INJECTION, SOLUTION INTRAMUSCULAR; INTRAVENOUS; SUBCUTANEOUS at 12:30

## 2023-01-30 RX ADMIN — METRONIDAZOLE 500 MG: 500 INJECTION, SOLUTION INTRAVENOUS at 23:30

## 2023-01-30 RX ADMIN — ACETAMINOPHEN 650 MG: 325 TABLET ORAL at 22:58

## 2023-01-30 RX ADMIN — SODIUM CHLORIDE, SODIUM LACTATE, POTASSIUM CHLORIDE, AND CALCIUM CHLORIDE: .6; .31; .03; .02 INJECTION, SOLUTION INTRAVENOUS at 10:10

## 2023-01-30 RX ADMIN — SODIUM CHLORIDE, POTASSIUM CHLORIDE, SODIUM LACTATE AND CALCIUM CHLORIDE: 600; 310; 30; 20 INJECTION, SOLUTION INTRAVENOUS at 06:59

## 2023-01-30 RX ADMIN — OCTREOTIDE ACETATE 100 MCG: 100 INJECTION, SOLUTION INTRAVENOUS; SUBCUTANEOUS at 15:55

## 2023-01-30 RX ADMIN — HYDROMORPHONE HYDROCHLORIDE 0.5 MG: 1 INJECTION, SOLUTION INTRAMUSCULAR; INTRAVENOUS; SUBCUTANEOUS at 23:48

## 2023-01-30 ASSESSMENT — PAIN SCALES - GENERAL
PAINLEVEL_OUTOF10: 9
PAINLEVEL_OUTOF10: 8
PAINLEVEL_OUTOF10: 4
PAINLEVEL_OUTOF10: 4
PAINLEVEL_OUTOF10: 7
PAINLEVEL_OUTOF10: 9
PAINLEVEL_OUTOF10: 2
PAINLEVEL_OUTOF10: 0
PAINLEVEL_OUTOF10: 10
PAINLEVEL_OUTOF10: 6
PAINLEVEL_OUTOF10: 7

## 2023-01-30 ASSESSMENT — PAIN DESCRIPTION - ONSET
ONSET: ON-GOING
ONSET: ON-GOING
ONSET: SUDDEN
ONSET: ON-GOING

## 2023-01-30 ASSESSMENT — PAIN DESCRIPTION - DESCRIPTORS
DESCRIPTORS: OTHER (COMMENT)
DESCRIPTORS: OTHER (COMMENT)
DESCRIPTORS: ACHING
DESCRIPTORS: OTHER (COMMENT)

## 2023-01-30 ASSESSMENT — PAIN DESCRIPTION - FREQUENCY
FREQUENCY: CONTINUOUS

## 2023-01-30 ASSESSMENT — PAIN DESCRIPTION - ORIENTATION
ORIENTATION: ANTERIOR

## 2023-01-30 ASSESSMENT — PAIN DESCRIPTION - LOCATION
LOCATION: ABDOMEN

## 2023-01-30 ASSESSMENT — PAIN DESCRIPTION - PAIN TYPE
TYPE: SURGICAL PAIN

## 2023-01-30 ASSESSMENT — PAIN - FUNCTIONAL ASSESSMENT: PAIN_FUNCTIONAL_ASSESSMENT: 0-10

## 2023-01-30 NOTE — PROGRESS NOTES
[employer]  ROBOTIC / Richard Acosta ABDOMINAL LYMPH NODE EXCISION  General  Panel 2  Nena Skinner    Patient meet clinical criteria for discharge to the floor  Patient has received prn pain medication in 4 doses while in PACU  comfortable now    Awaiting room to be cleaned    DJ RN has been down to get report

## 2023-01-30 NOTE — PROGRESS NOTES
[facility]) Physicians   General & Laparoscopic Surgery  [employer]      HPI:    Rosalinda Howell is a very pleasant 74 y.o. obese female ,   Body mass index is 37.55 kg/m². And multiple medical problems who is presenting for consultation by Dr. Velia Paz for lap-band removal    Lost 80 pounds with placement  Has regained 20    Has nausea/vomiting with solids for about 8 years now    Recent EGD confirms no erosion    Past Medical History:   Diagnosis Date    Asthma     Diabetes mellitus ([address]     Elevated liver function tests 10/21/2016    Essential hypertension 12/12/2016    GERD (gastroesophageal reflux disease)     History of cystocele 09/12/2016    Hyperglycemia 10/21/2016    Hyperlipidemia     Hypertension     Hypothyroidism 10/21/2016    Migraine     Morbid obesity due to excess calories ([address] 10/21/2016    Reina's neuroma of left foot 10/21/2016    Non morbid obesity due to excess calories 09/12/2016    Obesity     Obstructive sleep apnea 10/21/2016    DENIES NOW AFTER WEIGHT LOSS    Osteoarthritis     Prediabetes 10/21/2016    Rectocele 09/12/2016    Seasonal allergic rhinitis 12/12/2016    Snoring 10/21/2016    Status post gastric banding 04/26/2010    Uterovaginal prolapse 09/12/2016     Past Surgical History:   Procedure Laterality Date    ABDOMINOPLASTY  1996    [place], [address]  09/21/2012    Dr. Meir Gonzalez, [address]      COLONOSCOPY  12/04/2013    Dr. Samantha Wallis.  [place]    CYSTOSCOPY  09/21/2012    Dr. Emani Bateman Left 10/25/2016    Dr. Babatunde Pedraza Left 01/2020    HYSTERECTOMY, VAGINAL  09/21/2012    Dr. Charity Benjamin    LAP BAND  01/15/2010    Dr. Abner Cerna Left 07/26/2010     Relda Prost - incisional biopsy of left tonsillar mass    TONSILLECTOMY  1980    [address], [address] ENDOSCOPY  01/23/2015    Dr. Wojciech Sheets ENDOSCOPY  10/16/2010    Dr. Aristeo Horn    work related injury, removed a bone     Family History   Problem Relation Age of Onset    Cancer Mother     Diabetes Father     High Blood Pressure Father     High Cholesterol Father     Heart Disease Father     Other Sister         Brain tumor Surgery-didn't wake up    Breast Cancer Sister 64    No Known Problems Sister     No Known Problems Sister     Diabetes Brother     Cancer Brother         cancer    Cancer Brother         bone    No Known Problems Brother     No Known Problems Brother     No Known Problems Brother     No Known Problems Brother     No Known Problems Brother     No Known Problems Daughter     No Known Problems Daughter     No Known Problems Daughter     No Known Problems Daughter     No Known Problems Daughter     No Known Problems Son     Asthma Other     Uterine Cancer Other     Heart Disease Other     Depression Other     Diabetes Other     High Cholesterol Other     High Blood Pressure Other     Kidney Disease Other     Migraines Other     Stroke Other     Arthritis Other      Social History     Tobacco Use    Smoking status: Never    Smokeless tobacco: Never   Substance Use Topics    Alcohol use: No     I counseled the patient on the importance of not smoking and risks of ETOH. Allergies   Allergen Reactions    Latex Rash    Fruit & Vegetable Daily [Nutritional Supplements] Other (See Comments)     Vomiting, onion, tomato, watermelon, peach    Kiwi Extract      LIPS ITCH    Lisinopril Other (See Comments)     Throat tightness.     Other Itching     Itchy lips and mouth with fruits-watermelon, peaches,meloln, avocado     Vitals:    01/18/23 1035   BP: 123/76   Pulse: 76   Weight: 212 lb (96.2 kg) Height: 5' 3\" (1.6 m)       Body mass index is 37.55 kg/m². No current outpatient medications on file. Review of Systems - History obtained from the patient  General ROS: negative  Psychological ROS: negative  Ophthalmic ROS: negative  Neurological ROS: negative  ENT ROS: negative  Allergy and Immunology ROS: negative  Hematological and Lymphatic ROS: negative  Endocrine ROS: negative  Respiratory ROS: negative  Cardiovascular ROS: negative  Gastrointestinal ROS:nausea,dysphagia  Genito-Urinary ROS: negative  Musculoskeletal ROS: negative   Skin ROS: negative    Physical Exam   Constitutional: Patient is oriented to person, place, and time. Vital signs are normal. Patient  appears well-developed and well-nourished. Patient  is active and cooperative. Non-toxic appearance. No distress. HENT:   Head: Normocephalic and atraumatic. Head is without laceration. Right Ear: External ear normal. No lacerations. No drainage, swelling or tenderness. Left Ear: External ear normal. No lacerations. No drainage, swelling or tenderness. Nose: Nose normal. No nose lacerations or nasal deformity. Mouth/Throat: Uvula is midline, oropharynx is clear and moist and mucous membranes are normal. No oropharyngeal exudate. Eyes: Conjunctivae, EOM and lids are normal. Pupils are equal, round, and reactive to light. Right eye exhibits no discharge. No foreign body present in the right eye. Left eye exhibits no discharge. No foreign body present in the left eye. No scleral icterus. Neck: Trachea normal and normal range of motion. Neck supple. No JVD present. No tracheal tenderness present. Carotid bruit is not present. No rigidity. No tracheal deviation and no edema present. No thyromegaly present. Cardiovascular: Normal rate, regular rhythm, normal heart sounds, intact distal pulses and normal pulses. Pulmonary/Chest: Effort normal and breath sounds normal. No stridor. No respiratory distress.  Patient  has no wheezes. Patient has no rales. Patient exhibits no tenderness and no crepitus. Abdominal: Soft. Normal appearance and bowel sounds are normal. Patient exhibits no distension, no abdominal bruit, no ascites and no mass. There is no hepatosplenomegaly. There is no tenderness. There is no rigidity, no rebound, no guarding and no CVA tenderness. No hernia. Hernia confirmed negative in the ventral area. Musculoskeletal: Normal range of motion. Patient exhibits no edema or tenderness. Neurological: Patient is alert and oriented to person, place, and time. Patient has normal strength. Coordination and gait normal. GCS eye subscore is 4. GCS verbal subscore is 5. GCS motor subscore is 6. Skin: Skin is warm and dry. No abrasion and no rash noted. Patient  is not diaphoretic. No cyanosis or erythema. Psychiatric: Patient has a normal mood and affect. speech is normal and behavior is normal. Cognition and memory are normal.             A/P  Rosalinda Howell is a very pleasant 74 y.o. female with Obesity,  Body mass index is 37.55 kg/m². and multiple obesity related co-morbidities. Rosalinda Howell is very motivated to lose weight and being more healthy. We discussed how her weight affects her overall health including:  Prisca Aguayo was seen today for surgical consult.     Diagnoses and all orders for this visit:    Intractable nausea and vomiting    Pharyngoesophageal dysphagia    LAP-BAND surgery status        Signs and symptoms of lap-band slip with nausea and dysphagia to solids    Will plan on removal of lap-band in conjunction with Dr. Velia Paz    All risks, benefits, alternatives explained    Zohaib Mccullough

## 2023-01-30 NOTE — OP NOTE
[facility] [address], [address]                                OPERATIVE REPORT    PATIENT NAME: Dominic Blank                     :        1955  MED REC NO:   [id number]                          ROOM:  ACCOUNT NO:   [de-identified]                           ADMIT DATE: 2023  PROVIDER:     Zohaib Mccullough DO    DATE OF PROCEDURE:  2023    PREOPERATIVE DIAGNOSES:  1. Intractable nausea, vomiting. 2.  Dysphagia. 3.  Previous lap band. POSTOPERATIVE DIAGNOSES:  1. Intractable nausea, vomiting. 2.  Dysphagia. 3.  Previous lap band. PROCEDURE PERFORMED:  Laparoscopic gastric band and port removal.    SURGEON:  Zohaib Mccullough DO    ASSISTANT:  1.  Yumiko Lee. 2.  Tammi Bernard    ANESTHESIA:  General endotracheal.    COMPLICATIONS:  None. CONDITION:  Stable. EBL: 15cc    INDICATIONS FOR PROCEDURE:  The patient is a 61-year-old female with  severe obesity who had a previous lap band that was placed, and she lost  approximately 80 pounds and regained about 20 pounds of it. She has  eight years of intractable nausea and dysphagia mostly with solid and  preferred removal of the band in conjunction with her hepatobiliary  procedure by Dr. Velia Paz. All risks, benefits and alternatives of  procedure have been clearly explained. DESCRIPTION OF THE OPERATIVE PROCEDURE:  The patient was brought to the  operative suite, laid in supine position with arms outstretched, and  after induction of general endotracheal anesthesia, tube was confirmed  to be in place with end-tidal CO2 and secured. A Monk catheter was  placed with clear return of urine. The patient was prepped and draped  in usual sterile manner. A small incision was made at the left midclavicular line and using the  Veress needle, abdomen was entered and pneumoperitoneum established to  15 mm of CO2.   A 5-mm 30-degree camera housed in a 5-mm Optiview trocar  was used to enter the abdomen under direct visualization. Once inside  the abdomen, additional trocars were placed. The patient was placed in  a reverse Trendelenburg position, and liver retractor was placed to lift  up the left lobe of the liver. Attention was then paid to the band and the tubing that was connected to  the band, and sharp scissors were used to cut the tubing of the band. Monopolar and LigaSure device was used to dissect the capsule around the  buckle of the band, and once this was completely exposed, the buckle was  undone and circumferentially removed. This was then removed from the  abdomen through a 15-mm port. Once this was accomplished, the area was  inspected. No obvious gastrotomy or bile was appreciated. Irrigation  was performed and suctioned. At that point, incision was made at the previous port site and  dissection was carried down through the subcutaneous tissue over the  capsule of the port. The port was completely dissected and freed and  sent as one construct port band and tubing in between. This was sent  for gross pathologic examination. All the capsule of the port had been  completely removed to prevent seroma formation, and no other foreign  body was appreciated. At this point, the fascia was closed with 0  Vicryl suture and area was irrigated with Betadine, peroxide and saline. Please see Dr. Seb Kaufman note for completion of his portion of the  operation. Sponge, needle and instrument counts were correct at the end of this  portion of the operation. I personally informed the patient's , Alecia Yeboah, of the findings of  the operation.         Tye Riggs DO    D: 01/30/2023 10:17:05       T: 01/30/2023 10:19:26     [email or web address]  Job#: [id number]     Doc#: [id number]    CC:

## 2023-01-30 NOTE — ANESTHESIA POSTPROCEDURE EVALUATION
Department of Anesthesiology  Postprocedure Note    Patient: Mamie Bernheim  MRN: [id number]  YOB: 1955  Date of evaluation: 1/30/2023      Procedure Summary     Date: 01/30/23 Room / Location: [address] [place] [address]    Anesthesia Start: 3679 Anesthesia Stop: 1118    Procedures:       ROBOTIC / LAPAROSCOPIC PANCREATIC MASS AND ABDOMINAL LYMPH NODE EXCISION (Abdomen)      LAPAROSCOPIC GASTRIC BAND AND PORT REMOVAL (Abdomen) Diagnosis:       Pancreatic mass      LAP-BAND surgery status      Nausea and vomiting, unspecified vomiting type      (Pancreatic mass [K86.89])      (LAP-BAND surgery status [Z98.84])      (Nausea and vomiting, unspecified vomiting type [R11.2])    Surgeons: Anel Hernandez MD; Manuel Gonzalez DO Responsible Provider: Severino Orr MD    Anesthesia Type: general ASA Status: 3          Anesthesia Type: No value filed.     Jean Claude Phase I: Jean Claude Score: 8    Jean Claude Phase II:        Anesthesia Post Evaluation    Patient location during evaluation: PACU  Patient participation: complete - patient participated  Level of consciousness: awake and alert  Airway patency: patent  Nausea & Vomiting: no nausea and no vomiting  Complications: no  Cardiovascular status: hemodynamically stable  Respiratory status: acceptable  Hydration status: euvolemic  Multimodal analgesia pain management approach

## 2023-01-30 NOTE — H&P
Update History & Physical    The patient's History and Physical of January 19, 2023 was reviewed with the patient and I examined the patient. There was no change. The surgical site was confirmed by the patient and me. Assessment: Pancreatic neuroendocrine tumor  Plan: The risks, benefits, expected outcome, and alternative to the recommended procedure have been discussed with the patient. Patient understands and wants to proceed with the procedure.      Electronically signed by Tammi Bernard MD on 1/30/2023 at 7:31 AM

## 2023-01-30 NOTE — BRIEF OP NOTE
Brief Postoperative Note      Patient: Sue Hamilton  YOB: 1955  MRN: [id number]    Date of Procedure: 1/30/2023    Pre-Op Diagnosis: Pancreatic mass [K86.89]  LAP-BAND surgery status [Z98.84]  Nausea and vomiting, unspecified vomiting type [R11.2]    Post-Op Diagnosis: Same       Procedure(s):  ROBOTIC / LAPAROSCOPIC PANCREATIC MASS AND ABDOMINAL LYMPH NODE EXCISION  LAPAROSCOPIC GASTRIC BAND AND PORT REMOVAL    Surgeon(s): DO Rhonda Rojas MD    Assistant:  Surgical Assistant: Sumi Tapia  Resident: Mo Bailey MD    Anesthesia: General    Estimated Blood Loss (mL): less than 573     Complications: None    Specimens:   ID Type Source Tests Collected by Time Destination   A : GASTRIC BAND AND PORT (GROSS ONLY) Hardware Hardware SURGICAL PATHOLOGY Rhonda Gutierrez MD 1/30/2023 8387    B : ABDOMINAL LYMPH NODE Tissue Tissue SURGICAL PATHOLOGY Rhonda Gutierrez MD 1/30/2023 1045    C : PANCREAS MASS Tissue Tissue SURGICAL PATHOLOGY Rhonda Gutierrez MD 1/30/2023 1055        Implants:  * No implants in log *      Drains:   Urinary Catheter 01/30/23 Monk (Active)       Findings: exophytic pancreatic mass removed, gastric band and its port was removed.     Electronically signed by Mo Bailey MD on 1/30/2023 at 11:21 AM

## 2023-01-30 NOTE — CARE COORDINATION
3:52 PM  Upon review of pts chart, pt is from home, IPTA, no current home services. Pt denies a need for any. Pt has transport at time of dc. CM will sign off at this time. Please consult CM/SW if any dcp needs arise.     Electronically signed by Keyanna Miller RN, CM on 1/30/2023 at 3:52 PM.  Phone: [phone]  Fax: [phone]

## 2023-01-30 NOTE — PROGRESS NOTES
Patient A&O X 4. Vital signs with exception to pain and nausea. Patient was given prn dilaudid for pain and Zofran for nausea. Patient is on 1 liter of O2 via NC. Monk removed earlier this shift per nurse giving report. Patient is voiding consistently. Patient was able to ambulate to bathroom with minimal assistance without issue. 6 lap sites present that are clean dry and intact. Patient has daughter and  at bedside. Bed in lowest position and call light in reach. Will continue to monitor.

## 2023-01-30 NOTE — PROGRESS NOTES
PACU Transfer to Floor Note  #[id number]    Procedure(s):  ROBOTIC / Christiane Rushign    Current Allergies: Latex, Fruit & vegetable daily [nutritional supplements], Kiwi extract, Lisinopril, and Other    Pt meets criteria as per Jean Claude Score and ASPAN Standards to transfer to next phase of care. Recent Labs     01/30/23  0652 01/30/23  1124   POCGLU 102* 143*       Vitals:    01/30/23 1347   BP: 107/65   Pulse: 73   Resp: 12   Temp: 98.2   SpO2: 93       SpO2: 93 %    O2 Flow Rate (L/min): 1 L/min      Intake/Output Summary (Last 24 hours) at 1/30/2023 1350  Last data filed at 1/30/2023 1347  Gross per 24 hour   Intake 1550 ml   Output 225 ml   Net 1325 ml     Tolerating ice chips / clear liquid diet ordered    Pain assessment:  patient received IV prn pain medication is comfortable    Pain Level: 4    Patient was assessed for alterations to skin integrity. There were not alterations observed. Is patient incontinent: no    Handoff report given at bedside.  LAMAR RN [facility] updated and directed to pt room  Via waiting room staff      1/30/2023 1:50 PM

## 2023-01-30 NOTE — ANESTHESIA PRE PROCEDURE
Department of Anesthesiology  Preprocedure Note       Name:  Yuriy Burden   Age:  74 y.o.  :  1955                                          MRN:  [id number]         Date:  2023      Surgeon: Surgeon(s):  MD Jane Martinez [address], DO    Procedure: Procedure(s):  ROBOTIC / Ofilia Bill. POSSIBLE SPLENECTOMY, POSSIBLE OPEN  . LAPAROSCOPIC GASTRIC BAND AND PORT REMOVAL    Medications prior to admission:   Prior to Admission medications    Medication Sig Start Date End Date Taking? Authorizing Provider   Cholecalciferol 50 MCG (2000) CAPS Take 50 mcg by mouth daily    Historical Provider, MD   amLODIPine-atorvastatatin (CADUET) 5-10 MG per tablet Take 20 mg by mouth nightly    Historical Provider, MD   fexofenadine (ALLEGRA) 180 MG tablet Take 180 mg by mouth as needed    Historical Provider, MD   PREVIDENT 5000 SENSITIVE 1.1-5 % GEL  22   Historical Provider, MD   omeprazole (PRILOSEC) 40 MG delayed release capsule TAKE 1 CAPSULE BY MOUTH EVERY MORNING BEFORE BREAKFAST 22   Kim Bingham MD   albuterol sulfate HFA (PROVENTIL;VENTOLIN;PROAIR) 108 (90 Base) MCG/ACT inhaler INHALE 2 PUFFS INTO THE LUNGS EVERY 6 HOURS AS NEEDED FOR WHEEZING OR SHORTNESS OF BREATH 22   Kim Bingham MD   levothyroxine (SYNTHROID) 100 MCG tablet TAKE 1 TABLET BY MOUTH ONE TIME A DAY 10/20/22   Kim Bingham MD   losartan (COZAAR) 50 MG tablet Take 1 tablet by mouth daily  Patient taking differently: Take 50 mg by mouth at bedtime 10/20/22   Kim Bingham MD   metFORMIN (GLUCOPHAGE) 500 MG tablet Take 1 tablet by mouth in the morning and 1 tablet in the evening. Take with meals.   Patient taking differently: Take 500 mg by mouth daily 22   Alyssa Jett MD   [facility] ULTRA strip TEST 1 TIME A DAY AND AS NEEDED FOR SYMPTOMS OF FLUCTUATING GLUCOSE 10/23/20   Kim Bingham MD   blood glucose monitor kit and supplies Check Blood sugars once daily and as needed. Include: monitor, strips, lancing device, lancets, control solutions, alcohol swabs. 10/12/20   Merle García MD   Lancets MISC Test 1 times a day & as needed for symptoms of fluctuating blood glucose. Dispense sufficient amount . 10/12/20   Merle García MD   blood glucose monitor strips Test 1 times a day & as needed for symptoms of fluctuating blood glucose. Dispense sufficient amount . 10/12/20   Merle García MD   ONETOUCH VERIO strip 1 each by In Vitro route 2 times daily 9/16/19   Tamar Bermeo MD   Blood Glucose Monitoring Suppl (ONETOUCH VERIO FLEX SYSTEM) w/Device KIT 1 Device by Does not apply route once for 1 dose 9/16/19 10/20/22  MD Jeannine Hedrickdebbie Raid LANCETS 25Q MISC 1 each by Does not apply route 2 times daily 9/16/19   Tamar Bermeo MD       Current medications:    Current Facility-Administered Medications   Medication Dose Route Frequency Provider Last Rate Last Admin    cefTRIAXone (ROCEPHIN) 1,000 mg in sodium chloride 0.9 % 50 mL IVPB (mini-bag)  1,000 mg IntraVENous Once Shayna Alaniz MD        metronidazole (FLAGYL) 500 mg in 0.9% NaCl 100 mL IVPB premix  500 mg IntraVENous Once Shayna Alaniz MD        lactated ringers IV soln infusion   IntraVENous Continuous Doc Juárez DO           Allergies: Allergies   Allergen Reactions    Latex Rash    Fruit & Vegetable Daily [Nutritional Supplements] Other (See Comments)     Vomiting, onion, tomato, watermelon, peach    Kiwi Extract      LIPS ITCH    Lisinopril Other (See Comments)     Throat tightness.     Other Itching     Itchy lips and mouth with fruits-watermelon, peaches,meloln, avocado       Problem List:    Patient Active Problem List   Diagnosis Code    Arthritis M19.90    Asthma J45.909    Hyperlipidemia E78.5    Status post gastric banding Z98.84    Non morbid obesity due to excess calories E66.09    History of cystocele Z87.448    Rectocele N81.6    Pelvic relaxation due to uterine prolapse N81.4    Hypothyroidism E03.9    Hyperglycemia R73.9    Diabetes mellitus type 2, controlled, without complications ([facility]) L46.9    Elevated liver function tests R79.89    Reina's neuroma of left foot G57.62    Snoring R06.83    Obstructive sleep apnea G47.33    Morbid obesity due to excess calories ([facility]) E66.01    Seasonal allergic rhinitis J30.2    Primary hypertension I10    Pain in right upper arm M79.621    Numbness and tingling of both upper extremities while sleeping R20.0, R20.2    Arthritis of right acromioclavicular joint M19.011    Pain radiating to neck M54.2    Biceps tendinitis on right M75.21    Bursitis of right shoulder M75.51    Tendinitis of right rotator cuff M75.81    Chronic right shoulder pain M25.511, G89.29    Class 2 obesity with body mass index (BMI) of 39.0 to 39.9 in adult E66.9, Z68.39    Thyroid nodule E04.1    Class 2 obesity in adult E66.9    Type 2 diabetes mellitus (HCC) E11.9    Vitamin D deficiency E55.9    Causalgia of upper limb G56.40    Contusion of right knee S80. 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    Sprain of right shoulder S43.401A    Sprain of right wrist S63.501A    Strain of shoulder, right S46.911A    TETO (stress urinary incontinence, female) N39.3    Abnormal CT of the abdomen R93.5       Past Medical History:        Diagnosis Date    Asthma     Diabetes mellitus ([facility]     Elevated liver function tests 10/21/2016    Essential hypertension 12/12/2016    GERD (gastroesophageal reflux disease)     History of cystocele 09/12/2016    Hyperglycemia 10/21/2016    Hyperlipidemia     Hypertension     Hypothyroidism 10/21/2016    Migraine     Morbid obesity due to excess calories ([facility] 10/21/2016    Reina's neuroma of left foot 10/21/2016    Non morbid obesity due to excess calories 09/12/2016    Obesity     Obstructive sleep apnea 10/21/2016    DENIES NOW AFTER WEIGHT LOSS    Osteoarthritis     Prediabetes 10/21/2016    Rectocele 09/12/2016    Seasonal allergic rhinitis 12/12/2016    Snoring 10/21/2016    Status post gastric banding 04/26/2010    Uterovaginal prolapse 09/12/2016       Past Surgical History:        Procedure Laterality Date    ABDOMINOPLASTY  1996    [facility], [facility]  09/21/2012    Dr. Jarvis Valdez, [address]      CHOLECYSTECTOMY      COLONOSCOPY  12/04/2013    Dr. Ace Kennedy. [place]    CYSTOSCOPY  09/21/2012    Dr. Denny Yan Left 10/25/2016    Dr. Neal Dale Left 01/2020    HYSTERECTOMY, VAGINAL  09/21/2012    Dr. Blanco Tia    LAP BAND  01/15/2010    Dr. Janessa Santiago Left 07/26/2010    Dr. Rosanna Gill - incisional biopsy of left tonsillar mass   [address] [place] [address] UPPER GASTROINTESTINAL ENDOSCOPY  01/23/2015    Dr. Franca Diallo  10/16/2010    Dr. Yenni Arciniega    work related injury, removed a bone       Social History:    Social History     Tobacco Use    Smoking status: Never    Smokeless tobacco: Never   Substance Use Topics    Alcohol use:  No                                Counseling given: Not Answered      Vital Signs (Current):   Vitals:    01/24/23 1040 01/30/23 0619   BP:  133/71   Pulse:  84   Resp:  15   Temp:  97.3 °F (36.3 °C)   TempSrc:  Temporal   SpO2:  97%   Weight: 209 lb (94.8 kg) 206 lb 3.2 oz (93.5 kg)   Height: 5' 3\" (1.6 m) 5' 3\" (1.6 m) BP Readings from Last 3 Encounters:   01/30/23 133/71   01/19/23 (!) 140/78   01/18/23 123/76       NPO Status: Time of last liquid consumption: 2100                        Time of last solid consumption: 2100                        Date of last liquid consumption: 01/29/23                        Date of last solid food consumption: 01/29/23    BMI:   Wt Readings from Last 3 Encounters:   01/30/23 206 lb 3.2 oz (93.5 kg)   01/19/23 209 lb (94.8 kg)   01/18/23 212 lb (96.2 kg)     Body mass index is 36.53 kg/m². CBC:   Lab Results   Component Value Date/Time    WBC 5.8 01/03/2023 03:50 PM    RBC 3.41 01/03/2023 03:50 PM    HGB 10.5 01/03/2023 03:50 PM    HCT 31.7 01/03/2023 03:50 PM    MCV 92.8 01/03/2023 03:50 PM    RDW 13.8 01/03/2023 03:50 PM     01/03/2023 03:50 PM       CMP:   Lab Results   Component Value Date/Time     01/03/2023 03:50 PM    K 4.0 01/03/2023 03:50 PM     01/03/2023 03:50 PM    CO2 23 01/03/2023 03:50 PM    BUN 12 01/03/2023 03:50 PM    CREATININE 0.6 01/03/2023 03:50 PM    GFRAA >60 07/28/2022 08:37 AM    GFRAA >60 02/17/2010 12:28 PM    AGRATIO 0.7 11/08/2022 09:10 AM    LABGLOM >60 01/03/2023 03:50 PM    GLUCOSE 83 01/03/2023 03:50 PM    PROT 9.0 11/08/2022 09:10 AM    PROT 8.2 02/17/2010 12:28 PM    CALCIUM 9.4 01/03/2023 03:50 PM    BILITOT 0.3 11/08/2022 09:10 AM    ALKPHOS 104 11/08/2022 09:10 AM    AST 15 11/08/2022 09:10 AM    ALT 14 11/08/2022 09:10 AM       POC Tests:   Recent Labs     01/30/23  0652   POCGLU 102*       Coags: No results found for: PROTIME, INR, APTT    HCG (If Applicable): No results found for: PREGTESTUR, PREGSERUM, HCG, HCGQUANT     ABGs: No results found for: PHART, PO2ART, KPH3ITA, NTS6LUV, BEART, L4FHCOSK     Type & Screen (If Applicable):  No results found for: LABABO, LABRH    Drug/Infectious Status (If Applicable):  No results found for: HIV, HEPCAB    COVID-19 Screening (If Applicable):  No results found for: COVID19        Anesthesia Evaluation  Patient summary reviewed and Nursing notes reviewed no history of anesthetic complications:   Airway: Mallampati: II  TM distance: >3 FB   Neck ROM: full  Mouth opening: > = 3 FB   Dental: normal exam         Pulmonary:normal exam    (+) sleep apnea:  asthma:                            Cardiovascular:    (+) hypertension:,                   Neuro/Psych:   (+) neuromuscular disease:, headaches:,             GI/Hepatic/Renal:   (+) GERD:,           Endo/Other:    (+) DiabetesType II DM, , hypothyroidism::., .                 Abdominal:             Vascular: negative vascular ROS. Other Findings:           Anesthesia Plan      general     ASA 3       Induction: intravenous. MIPS: Postoperative opioids intended and Prophylactic antiemetics administered. Anesthetic plan and risks discussed with patient. Use of blood products discussed with patient whom consented to blood products. Plan discussed with CRNA.     Attending anesthesiologist reviewed and agrees with Preprocedure content                Bi Vigil MD   1/30/2023

## 2023-01-30 NOTE — PROGRESS NOTES
Robotics  ROBOTIC / LAPAROSCOPIC PANCREATIC MASS AND ABDOMINAL LYMPH NODE EXCISION  General  Panel 2  [facility]    Dr Ty Platt    Current Allergies: Latex, Fruit & vegetable daily [nutritional supplements], Kiwi extract, Lisinopril, and Other    Recent Labs     01/30/23  0652   POCGLU 102*       Admitted to PACU bed 13 from OR. Arrived on a bed,  Patient to be admitted to [address] [place] [address] . Attached to PACU monitoring system. Alarms and parameters set. Report received from anesthesia personnel. [facility] staff did not report skin issues that were observed while in OR  No problems reported intraoperatively. Pt arrived with oxygen per nasal cannula with oxygen at 3 liters. Athrombic wraps in place. Surgical site with surgical glue x 6   No dressing      Doctors aware of all labs before coming to recovery.

## 2023-01-31 LAB
ALBUMIN SERPL-MCNC: 3.1 G/DL (ref 3.4–5)
ANION GAP SERPL CALCULATED.3IONS-SCNC: 7 MMOL/L (ref 3–16)
BASOPHILS ABSOLUTE: 0 K/UL (ref 0–0.2)
BASOPHILS RELATIVE PERCENT: 0.2 %
BUN BLDV-MCNC: 12 MG/DL (ref 7–20)
CALCIUM SERPL-MCNC: 9.4 MG/DL (ref 8.3–10.6)
CHLORIDE BLD-SCNC: 102 MMOL/L (ref 99–110)
CO2: 26 MMOL/L (ref 21–32)
CREAT SERPL-MCNC: 0.7 MG/DL (ref 0.6–1.2)
EOSINOPHILS ABSOLUTE: 0 K/UL (ref 0–0.6)
EOSINOPHILS RELATIVE PERCENT: 0.1 %
GFR SERPL CREATININE-BSD FRML MDRD: >60 ML/MIN/{1.73_M2}
GLUCOSE BLD-MCNC: 117 MG/DL (ref 70–99)
HCT VFR BLD CALC: 28.9 % (ref 36–48)
HEMOGLOBIN: 9.7 G/DL (ref 12–16)
LYMPHOCYTES ABSOLUTE: 1.4 K/UL (ref 1–5.1)
LYMPHOCYTES RELATIVE PERCENT: 16.3 %
MAGNESIUM: 1.9 MG/DL (ref 1.8–2.4)
MCH RBC QN AUTO: 30.9 PG (ref 26–34)
MCHC RBC AUTO-ENTMCNC: 33.7 G/DL (ref 31–36)
MCV RBC AUTO: 91.5 FL (ref 80–100)
MONOCYTES ABSOLUTE: 0.7 K/UL (ref 0–1.3)
MONOCYTES RELATIVE PERCENT: 7.6 %
NEUTROPHILS ABSOLUTE: 6.5 K/UL (ref 1.7–7.7)
NEUTROPHILS RELATIVE PERCENT: 75.8 %
PDW BLD-RTO: 13.7 % (ref 12.4–15.4)
PHOSPHORUS: 3 MG/DL (ref 2.5–4.9)
PLATELET # BLD: 196 K/UL (ref 135–450)
PMV BLD AUTO: 9.6 FL (ref 5–10.5)
POTASSIUM SERPL-SCNC: 3.8 MMOL/L (ref 3.5–5.1)
RBC # BLD: 3.15 M/UL (ref 4–5.2)
SODIUM BLD-SCNC: 135 MMOL/L (ref 136–145)
WBC # BLD: 8.6 K/UL (ref 4–11)

## 2023-01-31 PROCEDURE — 97530 THERAPEUTIC ACTIVITIES: CPT

## 2023-01-31 PROCEDURE — 1200000000 HC SEMI PRIVATE

## 2023-01-31 PROCEDURE — 6360000002 HC RX W HCPCS: Performed by: NURSE PRACTITIONER

## 2023-01-31 PROCEDURE — 2580000003 HC RX 258: Performed by: STUDENT IN AN ORGANIZED HEALTH CARE EDUCATION/TRAINING PROGRAM

## 2023-01-31 PROCEDURE — 99024 POSTOP FOLLOW-UP VISIT: CPT | Performed by: SURGERY

## 2023-01-31 PROCEDURE — 80069 RENAL FUNCTION PANEL: CPT

## 2023-01-31 PROCEDURE — 97162 PT EVAL MOD COMPLEX 30 MIN: CPT

## 2023-01-31 PROCEDURE — 6360000002 HC RX W HCPCS: Performed by: SURGERY

## 2023-01-31 PROCEDURE — 85025 COMPLETE CBC W/AUTO DIFF WBC: CPT

## 2023-01-31 PROCEDURE — 36415 COLL VENOUS BLD VENIPUNCTURE: CPT

## 2023-01-31 PROCEDURE — 6360000002 HC RX W HCPCS

## 2023-01-31 PROCEDURE — 2500000003 HC RX 250 WO HCPCS: Performed by: SURGERY

## 2023-01-31 PROCEDURE — 6370000000 HC RX 637 (ALT 250 FOR IP)

## 2023-01-31 PROCEDURE — 6370000000 HC RX 637 (ALT 250 FOR IP): Performed by: SURGERY

## 2023-01-31 PROCEDURE — 97165 OT EVAL LOW COMPLEX 30 MIN: CPT

## 2023-01-31 PROCEDURE — 83735 ASSAY OF MAGNESIUM: CPT

## 2023-01-31 PROCEDURE — 2580000003 HC RX 258: Performed by: SURGERY

## 2023-01-31 PROCEDURE — 97116 GAIT TRAINING THERAPY: CPT

## 2023-01-31 PROCEDURE — 6370000000 HC RX 637 (ALT 250 FOR IP): Performed by: NURSE PRACTITIONER

## 2023-01-31 PROCEDURE — 94640 AIRWAY INHALATION TREATMENT: CPT

## 2023-01-31 PROCEDURE — 97535 SELF CARE MNGMENT TRAINING: CPT

## 2023-01-31 RX ORDER — POTASSIUM CHLORIDE 20 MEQ/1
20 TABLET, EXTENDED RELEASE ORAL ONCE
Status: COMPLETED | OUTPATIENT
Start: 2023-01-31 | End: 2023-01-31

## 2023-01-31 RX ORDER — DOCUSATE SODIUM 100 MG/1
100 CAPSULE, LIQUID FILLED ORAL 2 TIMES DAILY
Qty: 60 CAPSULE | Refills: 0 | Status: SHIPPED | OUTPATIENT
Start: 2023-01-31 | End: 2023-03-02

## 2023-01-31 RX ORDER — PROCHLORPERAZINE EDISYLATE 5 MG/ML
5 INJECTION INTRAMUSCULAR; INTRAVENOUS EVERY 6 HOURS PRN
Status: DISCONTINUED | OUTPATIENT
Start: 2023-01-31 | End: 2023-02-02 | Stop reason: HOSPADM

## 2023-01-31 RX ORDER — HYDROCODONE BITARTRATE AND ACETAMINOPHEN 5; 325 MG/1; MG/1
1 TABLET ORAL EVERY 6 HOURS PRN
Qty: 28 TABLET | Refills: 0 | Status: SHIPPED | OUTPATIENT
Start: 2023-01-31 | End: 2023-02-07

## 2023-01-31 RX ORDER — HYDROCODONE BITARTRATE AND ACETAMINOPHEN 5; 325 MG/1; MG/1
1 TABLET ORAL EVERY 4 HOURS PRN
Status: DISCONTINUED | OUTPATIENT
Start: 2023-01-31 | End: 2023-02-02 | Stop reason: HOSPADM

## 2023-01-31 RX ORDER — HYDROCODONE BITARTRATE AND ACETAMINOPHEN 5; 325 MG/1; MG/1
2 TABLET ORAL EVERY 4 HOURS PRN
Status: DISCONTINUED | OUTPATIENT
Start: 2023-01-31 | End: 2023-02-02 | Stop reason: HOSPADM

## 2023-01-31 RX ORDER — ONDANSETRON 4 MG/1
4 TABLET, ORALLY DISINTEGRATING ORAL EVERY 8 HOURS PRN
Qty: 30 TABLET | Refills: 0 | Status: SHIPPED | OUTPATIENT
Start: 2023-01-31

## 2023-01-31 RX ADMIN — METRONIDAZOLE 500 MG: 500 INJECTION, SOLUTION INTRAVENOUS at 07:46

## 2023-01-31 RX ADMIN — HYDROCODONE BITARTRATE AND ACETAMINOPHEN 2 TABLET: 5; 325 TABLET ORAL at 11:09

## 2023-01-31 RX ADMIN — OCTREOTIDE ACETATE 100 MCG: 100 INJECTION, SOLUTION INTRAVENOUS; SUBCUTANEOUS at 14:30

## 2023-01-31 RX ADMIN — PANTOPRAZOLE SODIUM 40 MG: 40 TABLET, DELAYED RELEASE ORAL at 05:41

## 2023-01-31 RX ADMIN — AMLODIPINE BESYLATE 5 MG: 5 TABLET ORAL at 19:58

## 2023-01-31 RX ADMIN — PROCHLORPERAZINE EDISYLATE 5 MG: 5 INJECTION, SOLUTION INTRAMUSCULAR; INTRAVENOUS at 11:07

## 2023-01-31 RX ADMIN — OCTREOTIDE ACETATE 100 MCG: 100 INJECTION, SOLUTION INTRAVENOUS; SUBCUTANEOUS at 23:19

## 2023-01-31 RX ADMIN — OCTREOTIDE ACETATE 100 MCG: 100 INJECTION, SOLUTION INTRAVENOUS; SUBCUTANEOUS at 07:06

## 2023-01-31 RX ADMIN — HYDROCODONE BITARTRATE AND ACETAMINOPHEN 2 TABLET: 5; 325 TABLET ORAL at 19:57

## 2023-01-31 RX ADMIN — LEVOTHYROXINE SODIUM 100 MCG: 0.1 TABLET ORAL at 05:41

## 2023-01-31 RX ADMIN — HYDROMORPHONE HYDROCHLORIDE 0.5 MG: 1 INJECTION, SOLUTION INTRAMUSCULAR; INTRAVENOUS; SUBCUTANEOUS at 07:06

## 2023-01-31 RX ADMIN — CEFTRIAXONE 1000 MG: 1 INJECTION, POWDER, FOR SOLUTION INTRAMUSCULAR; INTRAVENOUS at 09:38

## 2023-01-31 RX ADMIN — ENOXAPARIN SODIUM 40 MG: 100 INJECTION SUBCUTANEOUS at 09:35

## 2023-01-31 RX ADMIN — ONDANSETRON 4 MG: 2 INJECTION INTRAMUSCULAR; INTRAVENOUS at 07:39

## 2023-01-31 RX ADMIN — POTASSIUM CHLORIDE 20 MEQ: 1500 TABLET, EXTENDED RELEASE ORAL at 11:10

## 2023-01-31 RX ADMIN — ATORVASTATIN CALCIUM 10 MG: 10 TABLET, FILM COATED ORAL at 19:58

## 2023-01-31 RX ADMIN — SODIUM CHLORIDE, POTASSIUM CHLORIDE, SODIUM LACTATE AND CALCIUM CHLORIDE: 600; 310; 30; 20 INJECTION, SOLUTION INTRAVENOUS at 03:38

## 2023-01-31 RX ADMIN — SODIUM CHLORIDE, POTASSIUM CHLORIDE, SODIUM LACTATE AND CALCIUM CHLORIDE: 600; 310; 30; 20 INJECTION, SOLUTION INTRAVENOUS at 19:26

## 2023-01-31 RX ADMIN — ACETAMINOPHEN 650 MG: 325 TABLET ORAL at 05:41

## 2023-01-31 ASSESSMENT — PAIN - FUNCTIONAL ASSESSMENT
PAIN_FUNCTIONAL_ASSESSMENT: PREVENTS OR INTERFERES SOME ACTIVE ACTIVITIES AND ADLS
PAIN_FUNCTIONAL_ASSESSMENT: PREVENTS OR INTERFERES SOME ACTIVE ACTIVITIES AND ADLS

## 2023-01-31 ASSESSMENT — PAIN SCALES - GENERAL
PAINLEVEL_OUTOF10: 8
PAINLEVEL_OUTOF10: 6
PAINLEVEL_OUTOF10: 7
PAINLEVEL_OUTOF10: 4
PAINLEVEL_OUTOF10: 7
PAINLEVEL_OUTOF10: 3

## 2023-01-31 ASSESSMENT — PAIN DESCRIPTION - PAIN TYPE: TYPE: SURGICAL PAIN

## 2023-01-31 ASSESSMENT — PAIN DESCRIPTION - FREQUENCY: FREQUENCY: INTERMITTENT

## 2023-01-31 ASSESSMENT — PAIN DESCRIPTION - DESCRIPTORS
DESCRIPTORS: ACHING
DESCRIPTORS: ACHING

## 2023-01-31 ASSESSMENT — PAIN DESCRIPTION - LOCATION
LOCATION: ABDOMEN
LOCATION: ABDOMEN

## 2023-01-31 ASSESSMENT — PAIN DESCRIPTION - ORIENTATION: ORIENTATION: ANTERIOR

## 2023-01-31 ASSESSMENT — PAIN DESCRIPTION - ONSET: ONSET: GRADUAL

## 2023-01-31 NOTE — PROGRESS NOTES
General Surgery   Daily Progress Note  Patient: Alejandra Hammonds      CC: pancreatic mass, intractable NV    SUBJECTIVE:   Patient rested well overnight. Remained afebrile and HDS. Voiding independently. Denies BM or flatus. Tolerating diet without NV, having some upper esophageal dysphagia/ throat pain. Pain otherwise controlled at this time. ROS:   A 14 point review of systems was conducted, significant findings as noted above. All other systems negative. OBJECTIVE:    PHYSICAL EXAM:    Vitals:    01/30/23 1413 01/30/23 1930 01/30/23 2300 01/31/23 0346   BP: 122/71 127/86 123/66 115/63   Pulse: 72 67 68 70   Resp: 16 18 18 16   Temp: 98 °F (36.7 °C) 97 °F (36.1 °C) 98 °F (36.7 °C) 98 °F (36.7 °C)   TempSrc: Oral Oral Oral Oral   SpO2:  92% 93% 94%   Weight:       Height:           General appearance: alert, no acute distress  Eyes: No scleral icterus, EOM grossly intact  Neck: trachea midline, no JVD, neck supple  Chest/Lungs: normal effort with no accessory muscle use, no signs of respiratory distress, on RA  Cardiovascular: RRR   Abdomen: Soft, appropriately-tender, incision c/d/i and well approximated with Dermabond  Skin: warm and dry, no rashes  Extremities: no edema, no cyanosis  Genitourinary: Grossly normal  Neuro: A&Ox3, no focal deficits, sensation intact    LABS:   Recent Labs     01/30/23  0640 01/31/23  0543   WBC 5.3 8.6   HGB 10.5* 9.7*   HCT 30.5* 28.9*   MCV 90.2 91.5    196        Recent Labs     01/30/23  0640   *   K 3.3*      CO2 25   BUN 12   CREATININE 0.5*        Recent Labs     01/30/23  0640   AST 22   ALT 15   BILITOT 0.6   ALKPHOS 75      No results for input(s): LIPASE, AMYLASE in the last 72 hours. Recent Labs     01/30/23  0640   PROT 9.4*      No results for input(s): CKTOTAL, CKMB, CKMBINDEX, TROPONINI in the last 72 hours.       ASSESSMENT & PLAN:   This is a 74 y.o. female with a diagnosis of pancreatic mass and intractable nausea with vomiting s/p pancreatic mass and abdominal lymph node excision, laparoscopic gastric band and port removal.    - will advance to FLD, patient to stay on FLD for one week, and one week softs   - continue pain medications  - continue octreotide for 3 days  - ambulation, PTOT  - possible DC 1-2 days    Aditya Mercado DO  PGY1, General Surgery  01/31/23  6:27 AM  [employer]  Pager: [phone]    I have personally performed the medical history, physical exam and medical decision making and agree with all pertinent clinical information unless otherwise noted.      Rebecca Cowan MD  Surgery Attending

## 2023-01-31 NOTE — PLAN OF CARE
Problem: Pain  Goal: Verbalizes/displays adequate comfort level or baseline comfort level  Outcome: Progressing  Flowsheets (Taken 1/31/2023 6993)  Verbalizes/displays adequate comfort level or baseline comfort level:   Encourage patient to monitor pain and request assistance   Assess pain using appropriate pain scale   Administer analgesics based on type and severity of pain and evaluate response  Note: Patient continues with abdomen surgical pain, controlled with vicodin as needed. Encourage patient to notify staff of any uncontrolled pain. Patient resting in bed. Will monitor. Problem: Skin/Tissue Integrity  Goal: Absence of new skin breakdown  Description: 1. Monitor for areas of redness and/or skin breakdown  2. Assess vascular access sites hourly  3. Every 4-6 hours minimum:  Change oxygen saturation probe site  4. Every 4-6 hours:  If on nasal continuous positive airway pressure, respiratory therapy assess nares and determine need for appliance change or resting period. Outcome: Adequate for Discharge  Note: Skin intact except for surgical incision. Skin kept clean and dry. Patient turns and repositions on own. Problem: ABCDS Injury Assessment  Goal: Absence of physical injury  Flowsheets (Taken 1/31/2023 6797)  Absence of Physical Injury: Implement safety measures based on patient assessment  Note: Bed and chair alarms used. Call light kept in reach. Bed locked and in low position. Patient calls out appropriately prior to ambulation. Safety maintained.

## 2023-01-31 NOTE — PROGRESS NOTES
Occupational Therapy  Facility/Department: [address] [place] [address] Initial Assessment/tx/discharge    Name: Tyrone Quispe  :   MRN: [id number]  Date of Service: 2023    Discharge Recommendations: Tyrone Quispe scored a 23/24 on the AM-PAC ADL Inpatient form. At this time, no further OT is recommended upon discharge due to level of indep. OT Equipment Recommendations  Equipment Needed: No       Patient Diagnosis(es): The primary encounter diagnosis was Acute postoperative pain of abdomen. Diagnoses of Pancreatic mass, LAP-BAND surgery status, and Nausea and vomiting, unspecified vomiting type were also pertinent to this visit. Past Medical History:  has a past medical history of Asthma, Diabetes mellitus ([address] Elevated liver function tests, Essential hypertension, GERD (gastroesophageal reflux disease), History of cystocele, Hyperglycemia, Hyperlipidemia, Hypertension, Hypothyroidism, Migraine, Morbid obesity due to excess calories ([address] Reina's neuroma of left foot, Non morbid obesity due to excess calories, Obesity, Obstructive sleep apnea, Osteoarthritis, Prediabetes, Rectocele, Seasonal allergic rhinitis, Snoring, Status post gastric banding, and Uterovaginal prolapse. Past Surgical History:  has a past surgical history that includes Abdominoplasty ();  section (); Cholecystectomy; Wrist surgery (Right, ); Lap Band (01/15/2010); Upper gastrointestinal endoscopy (2015);  section; Colonoscopy (2013); Hysterectomy, vaginal (2012); Cystoscopy (2012); bladder suspension (2012); Soft Tissue Biopsy (Left, 2010); Upper gastrointestinal endoscopy (10/16/2010); Tonsillectomy (); Foot neuroma surgery (Left, 10/25/2016); Foot neuroma surgery (Left, 2020); Breast biopsy; Pancreatectomy (N/A, 2023); and Gastric Band (N/A, 2023).            Assessment   Assessment: Pt admitted with pancreatic mass, s/p ROBOTIC / LAPAROSCOPIC PANCREATIC MASS AND ABDOMINAL LYMPH NODE EXCISION  LAPAROSCOPIC GASTRIC BAND AND PORT REMOVAL. Pt is functioning slighlty below baseline level, but is indep with ADLs. Her daughter and  can provide close to 24 hr A at discharge. No acute OT needs, d/c OT. Recommend discharge to home with initial 24 hr A. Prognosis: Good  Decision Making: Low Complexity  REQUIRES OT FOLLOW-UP: No  Activity Tolerance  Activity Tolerance: Patient Tolerated treatment well        Plan   Occupational Therapy Plan  Additional Comments: d/c OT     Restrictions  Position Activity Restriction  Other position/activity restrictions: up as tolerated    Subjective   General  Chart Reviewed: Yes  Patient assessed for rehabilitation services?: Yes  Additional Pertinent Hx: PMH:  asthma, hypothyroidism, DM, OLIVIER, GERD, bladder suspension, lap band  Family / Caregiver Present: Yes ( and friend)  Referring Practitioner: Mena García DO  Diagnosis: Pt admitted with pancreatic mass, s/p ROBOTIC / LAPAROSCOPIC PANCREATIC MASS AND ABDOMINAL LYMPH NODE EXCISION  LAPAROSCOPIC GASTRIC BAND AND PORT REMOVAL  Subjective  Subjective: Pt in bed, agreeable to work with OT.      Social/Functional History  Social/Functional History  Lives With: Spouse  Type of Home: House  Home Layout: Multi-level, Bed/Bath upstairs (can use bedroom and bathroom)  Home Access: Stairs to enter with rails  Entrance Stairs - Number of Steps: 3  Bathroom Shower/Tub: Tub/Shower unit  Bathroom Toilet: Standard  Bathroom Equipment: None (sink next to toilet)  Home Equipment: None  Has the patient had two or more falls in the past year or any fall with injury in the past year?: No  Receives Help From: Family  ADL Assistance: [facility] [address]: Independent  Homemaking Responsibilities: Yes  Ambulation Assistance: Independent  Transfer Assistance: Independent  Active : Yes  Mode of Transportation: Car  Occupation: Retired  Additional Comments: daughter can help during day,  can help at night       Objective                Safety Devices  Type of Devices: Left in chair;Call light within reach; Chair alarm in place;Nurse notified  Balance  Sitting: Intact  Standing: Intact  Gait  Overall Level of Assistance:  (Functional mobility in room and hsu with rolling walker and CGA/SBA for safety, increased time/effort)  Toilet Transfers  Toilet - Technique: Ambulating (with rolling walker and SBA to/from bathroom)  Equipment Used: Standard toilet (grab bar)  Toilet Transfer: Modified independent  PROM:  (~100 shoulder flex, elbows to hands=WFL)  ADL  Grooming: Independent  LE Dressing: Independent (donning pants)  Toileting: Independent     Activity Tolerance  Activity Tolerance: Patient tolerated evaluation without incident;Patient limited by fatigue  Activity Tolerance Comments: fatigues quicker than normal, minimal deconditioning noted, able to recover quickly; pt has most significant pain when transferring from standing back to supine  Bed mobility  Sit to Supine:  (cues for log rolling, head of bed elevated, Josh)  Scooting: Supervision  Transfers  Sit to stand: Contact guard assistance;Stand by assistance (increased time/effort)  Stand to sit: Contact guard assistance;Stand by assistance  Vision  Vision: Impaired  Vision Exceptions: Wears glasses for reading  Hearing  Hearing: Within functional limits  Cognition  Overall Cognitive Status: WFL  Orientation  Overall Orientation Status: Within Functional Limits                  Education Given To: Patient  Education Provided: Role of Therapy;Plan of Care;Transfer Training;ADL Adaptive Strategies  Education Method: Demonstration;Verbal  Barriers to Learning: None  Education Outcome: Verbalized understanding;Demonstrated understanding                        G-Code     OutComes Score                                                  AM-PAC Score        AM-PAC Inpatient Daily Activity Raw Score: 23 (01/31/23 1603)  AM-PAC Inpatient ADL T-Scale Score : 51.12 (01/31/23 1603)  ADL Inpatient CMS 0-100% Score: 15.86 (01/31/23 1603)  ADL Inpatient CMS G-Code Modifier : CI (01/31/23 1603)                     Therapy Time   Individual Concurrent Group Co-treatment   Time In 1439         Time Out 1522         Minutes 43             Timed Code Treatment Minutes:   28    Total Treatment Minutes:   [address] [facility], OTR/L 94 31 11

## 2023-01-31 NOTE — PROGRESS NOTES
Patient alert and oriented. VSS Patient c/o pain , Dilaudid given. CDI surgical sites YAMIL. Patient uses IS, ambulates in hus. Patient in bed lowest position call light and bedside table within reach. All needs are met at this time. Patient aware to call if any help needed. Will continue to monitor  /63   Pulse 70   Temp 98 °F (36.7 °C) (Oral)   Resp 16   Ht 5' 3\" (1.6 m)   Wt 206 lb 3.2 oz (93.5 kg)   SpO2 94%   BMI 36.53 kg/m²

## 2023-01-31 NOTE — PROGRESS NOTES
Incentive Spirometry education and demonstration completed by Respiratory Therapy. Turning over to Nursing for routine follow-up. Minimum Predicted Vital Capacity - 748 mL. Patient's Actual Vital Capacity - 1500 mL.

## 2023-01-31 NOTE — PROGRESS NOTES
Bariatric Surgery   Daily Progress Note  Patient: Herson Lawrence      CC: pancreatic mass, intractable NV    SUBJECTIVE:   Patient rested well overnight. Remained afebrile and HDS. Voiding independently. Denies BM or flatus. Tolerating diet without NV, having some upper esophageal dysphagia/ throat pain. Pain otherwise controlled at this time. ROS:   A 14 point review of systems was conducted, significant findings as noted above. All other systems negative. OBJECTIVE:    PHYSICAL EXAM:    Vitals:    01/30/23 1413 01/30/23 1930 01/30/23 2300 01/31/23 0346   BP: 122/71 127/86 123/66 115/63   Pulse: 72 67 68 70   Resp: 16 18 18 16   Temp: 98 °F (36.7 °C) 97 °F (36.1 °C) 98 °F (36.7 °C) 98 °F (36.7 °C)   TempSrc: Oral Oral Oral Oral   SpO2:  92% 93% 94%   Weight:       Height:           General appearance: alert, no acute distress  Eyes: No scleral icterus, EOM grossly intact  Neck: trachea midline, no JVD, neck supple  Chest/Lungs: normal effort with no accessory muscle use, no signs of respiratory distress, on RA  Cardiovascular: RRR   Abdomen: Soft, appropriately-tender, incision c/d/i and well approximated with Dermabond  Skin: warm and dry, no rashes  Extremities: no edema, no cyanosis  Genitourinary: Grossly normal  Neuro: A&Ox3, no focal deficits, sensation intact    LABS:   Recent Labs     01/30/23  0640 01/31/23  0543   WBC 5.3 8.6   HGB 10.5* 9.7*   HCT 30.5* 28.9*   MCV 90.2 91.5    196          Recent Labs     01/30/23  0640   *   K 3.3*      CO2 25   BUN 12   CREATININE 0.5*          Recent Labs     01/30/23  0640   AST 22   ALT 15   BILITOT 0.6   ALKPHOS 75        No results for input(s): LIPASE, AMYLASE in the last 72 hours. Recent Labs     01/30/23  0640   PROT 9.4*        No results for input(s): CKTOTAL, CKMB, CKMBINDEX, TROPONINI in the last 72 hours.       ASSESSMENT & PLAN:   This is a 74 y.o. female with a diagnosis of pancreatic mass and intractable nausea with vomiting s/p pancreatic mass and abdominal lymph node excision, laparoscopic gastric band and port removal.    - will advance to FLD, patient to stay on FLD for one week, and one week softs   - continue pain medications  - continue octreotide for 3 days  - ambulation, PTOT  - possible DC 1-2 days    Ronald Espana DO  PGY1, General Surgery  01/31/23  6:34 AM  [employer]  Pager: [phone]

## 2023-01-31 NOTE — PROGRESS NOTES
Patient alert and oriented, up to chair. Surgical pain controlled. Abdomen soft, surgical incisions, glued, dion, cdi. Tolerating clear liquid diet. Zofran given earlier in shift for nausea. Encouraged IS, and ambulation. Patient stated she is allergic to Oxycodone, requested medication change from Leonardo SR, new orders received. LR infusing at 75 ml/hr,with intermittent antibiotics, right arm. Call light in reach. Will continue to monitor.

## 2023-01-31 NOTE — DISCHARGE INSTRUCTIONS
Lap Band removal diet advancement        Post-op: Week one    Full liquid diet  All clear liquids  Low fat milk  Sugar-free Jello  Sugar-free popsicles  Sugar-free [employer]  Yogurt (no fruit in it)  Cottage cheese  Oatmeal/ cream of wheat  Applesauce  Protein shake  Broth  Coffee  Tea  Crystal light   Powerade/Gatorade zero   Propel        Post-op: Week two       Soft diet  All clear and full liquids  Soft cooked/ ground meats/baked fish (no breading/fried fish)  Mushy cooked vegetables (avoid raw or undercooked vegetables)  Potatoes without skin  Canned peaches or pears (avoid fresh fruits)  Eggs  Cheeses  Crackers (saltines, wheat thins, triscuits)/Breads  Tuna or chicken salad without raw veggies mixed in  [employer]  Beans      After the first 2 weeks,  advance as tolerated      ACTIVITY  - No lifting >8lbs or a gallon of milk for 2 weeks. - No driving while on narcotics  Otherwise, you can drive when you can sit comfortably behind the steering wheel and can slam on the brake without it hurting or turn the wheel sharply without it hurting. Practice this when sitting the car with it parked in your driveway before trying to drive. Preventing Pneumonia   --use Incentive spirometer (IS-the breathing thing that the hospital gave you) every couple of hours. This will help prevent you from getting pneumonia. You want to do this for the next couple of weeks until you can take deep breaths without it hurting. Bathing  Hygiene is important so that you do not get an infection  - May shower, let water run over incision sites. No soaking in tub, hot tub, or pool. Do not submerge incision site in water.   - Notify MD immediately if experierencing fevers/chills, nausea/vomiting, pus-like discharge from incision sites, redness swelling, or warmth to incisions.       - Follow up with Dr. Theo Blake in 2 weeks - call [phone] for appointment.      Pain management:   Unless informed of any restrictions by your primary care physician, please use your preferred over-the-counter pain reliever as your primary pain medication. If you have pain that persists despite over-the-counter pain medications, you have been provided with a prescription for an opioid/narcotic pain reliever (Percocet). Be aware that this medication is a combination of opioid/narcotic and acetaminophen (the main ingredient in Tylenol); therefore, it will contribute to your daily limit of 4,000mg acetaminophen. No driving or operating machinery while taking opioid/narcotic medications. Bowel Regimen:   Opioid/Narcotic pain relievers have a common side effect of constipation; therefore, you have been provided with a prescription for a stool softener, Docusate (Colace). These medications are intended to help prevent you from experiencing this very common side effect and also help to regulate your bowels after surgery. Pain medication: It is important to be comfortable so that you can walk around and take part in caring for yourself but it is also important to wean off pain medications as quickly as you can    Pain medication can be constipating. It is ok to take a stool softner or milk of magnesia as directed on bottle.

## 2023-02-01 LAB
ALBUMIN SERPL-MCNC: 3 G/DL (ref 3.4–5)
ANION GAP SERPL CALCULATED.3IONS-SCNC: 7 MMOL/L (ref 3–16)
BASOPHILS ABSOLUTE: 0 K/UL (ref 0–0.2)
BASOPHILS RELATIVE PERCENT: 0.3 %
BUN BLDV-MCNC: 10 MG/DL (ref 7–20)
CALCIUM SERPL-MCNC: 8.8 MG/DL (ref 8.3–10.6)
CHLORIDE BLD-SCNC: 102 MMOL/L (ref 99–110)
CO2: 28 MMOL/L (ref 21–32)
CREAT SERPL-MCNC: 0.6 MG/DL (ref 0.6–1.2)
EOSINOPHILS ABSOLUTE: 0.1 K/UL (ref 0–0.6)
EOSINOPHILS RELATIVE PERCENT: 1.6 %
GFR SERPL CREATININE-BSD FRML MDRD: >60 ML/MIN/{1.73_M2}
GLUCOSE BLD-MCNC: 97 MG/DL (ref 70–99)
HCT VFR BLD CALC: 28.2 % (ref 36–48)
HEMOGLOBIN: 9.5 G/DL (ref 12–16)
LYMPHOCYTES ABSOLUTE: 2.9 K/UL (ref 1–5.1)
LYMPHOCYTES RELATIVE PERCENT: 33.3 %
MAGNESIUM: 1.8 MG/DL (ref 1.8–2.4)
MCH RBC QN AUTO: 31.1 PG (ref 26–34)
MCHC RBC AUTO-ENTMCNC: 33.7 G/DL (ref 31–36)
MCV RBC AUTO: 92.1 FL (ref 80–100)
MONOCYTES ABSOLUTE: 0.6 K/UL (ref 0–1.3)
MONOCYTES RELATIVE PERCENT: 6.9 %
NEUTROPHILS ABSOLUTE: 5 K/UL (ref 1.7–7.7)
NEUTROPHILS RELATIVE PERCENT: 57.9 %
PDW BLD-RTO: 13.8 % (ref 12.4–15.4)
PHOSPHORUS: 1.7 MG/DL (ref 2.5–4.9)
PLATELET # BLD: 177 K/UL (ref 135–450)
PMV BLD AUTO: 9.5 FL (ref 5–10.5)
POTASSIUM SERPL-SCNC: 3.7 MMOL/L (ref 3.5–5.1)
RBC # BLD: 3.06 M/UL (ref 4–5.2)
SODIUM BLD-SCNC: 137 MMOL/L (ref 136–145)
WBC # BLD: 8.7 K/UL (ref 4–11)

## 2023-02-01 PROCEDURE — 99024 POSTOP FOLLOW-UP VISIT: CPT | Performed by: SURGERY

## 2023-02-01 PROCEDURE — 6360000002 HC RX W HCPCS: Performed by: NURSE PRACTITIONER

## 2023-02-01 PROCEDURE — 2580000003 HC RX 258: Performed by: SURGERY

## 2023-02-01 PROCEDURE — 6360000002 HC RX W HCPCS: Performed by: SURGERY

## 2023-02-01 PROCEDURE — 6370000000 HC RX 637 (ALT 250 FOR IP): Performed by: NURSE PRACTITIONER

## 2023-02-01 PROCEDURE — 6370000000 HC RX 637 (ALT 250 FOR IP): Performed by: STUDENT IN AN ORGANIZED HEALTH CARE EDUCATION/TRAINING PROGRAM

## 2023-02-01 PROCEDURE — 1200000000 HC SEMI PRIVATE

## 2023-02-01 PROCEDURE — 85025 COMPLETE CBC W/AUTO DIFF WBC: CPT

## 2023-02-01 PROCEDURE — 97116 GAIT TRAINING THERAPY: CPT

## 2023-02-01 PROCEDURE — 36415 COLL VENOUS BLD VENIPUNCTURE: CPT

## 2023-02-01 PROCEDURE — 6370000000 HC RX 637 (ALT 250 FOR IP): Performed by: SURGERY

## 2023-02-01 PROCEDURE — 80069 RENAL FUNCTION PANEL: CPT

## 2023-02-01 PROCEDURE — 83735 ASSAY OF MAGNESIUM: CPT

## 2023-02-01 RX ORDER — POLYETHYLENE GLYCOL 3350 17 G/17G
17 POWDER, FOR SOLUTION ORAL DAILY
Qty: 527 G | Refills: 1 | Status: SHIPPED | OUTPATIENT
Start: 2023-02-01 | End: 2023-03-03

## 2023-02-01 RX ORDER — ACETAMINOPHEN 325 MG/1
650 TABLET ORAL EVERY 4 HOURS PRN
Status: DISCONTINUED | OUTPATIENT
Start: 2023-02-01 | End: 2023-02-02 | Stop reason: HOSPADM

## 2023-02-01 RX ORDER — METOCLOPRAMIDE HYDROCHLORIDE 5 MG/ML
10 INJECTION INTRAMUSCULAR; INTRAVENOUS EVERY 6 HOURS
Status: DISCONTINUED | OUTPATIENT
Start: 2023-02-01 | End: 2023-02-02 | Stop reason: HOSPADM

## 2023-02-01 RX ORDER — METOCLOPRAMIDE HYDROCHLORIDE 5 MG/ML
10 INJECTION INTRAMUSCULAR; INTRAVENOUS ONCE
Status: COMPLETED | OUTPATIENT
Start: 2023-02-01 | End: 2023-02-01

## 2023-02-01 RX ORDER — BISACODYL 10 MG
10 SUPPOSITORY, RECTAL RECTAL ONCE
Status: COMPLETED | OUTPATIENT
Start: 2023-02-01 | End: 2023-02-01

## 2023-02-01 RX ORDER — POLYETHYLENE GLYCOL 3350 17 G/17G
17 POWDER, FOR SOLUTION ORAL DAILY
Status: DISCONTINUED | OUTPATIENT
Start: 2023-02-01 | End: 2023-02-02 | Stop reason: HOSPADM

## 2023-02-01 RX ORDER — DOCUSATE SODIUM 100 MG/1
100 CAPSULE, LIQUID FILLED ORAL 2 TIMES DAILY
Status: DISCONTINUED | OUTPATIENT
Start: 2023-02-01 | End: 2023-02-02 | Stop reason: HOSPADM

## 2023-02-01 RX ADMIN — HYDROCODONE BITARTRATE AND ACETAMINOPHEN 2 TABLET: 5; 325 TABLET ORAL at 22:19

## 2023-02-01 RX ADMIN — OCTREOTIDE ACETATE 100 MCG: 100 INJECTION, SOLUTION INTRAVENOUS; SUBCUTANEOUS at 14:32

## 2023-02-01 RX ADMIN — DOCUSATE SODIUM 100 MG: 100 CAPSULE, LIQUID FILLED ORAL at 09:17

## 2023-02-01 RX ADMIN — AMLODIPINE BESYLATE 5 MG: 5 TABLET ORAL at 20:05

## 2023-02-01 RX ADMIN — DIBASIC SODIUM PHOSPHATE, MONOBASIC POTASSIUM PHOSPHATE AND MONOBASIC SODIUM PHOSPHATE 2 TABLET: 852; 155; 130 TABLET ORAL at 12:23

## 2023-02-01 RX ADMIN — DOCUSATE SODIUM 100 MG: 100 CAPSULE, LIQUID FILLED ORAL at 20:05

## 2023-02-01 RX ADMIN — SODIUM CHLORIDE, PRESERVATIVE FREE 10 ML: 5 INJECTION INTRAVENOUS at 08:05

## 2023-02-01 RX ADMIN — ENOXAPARIN SODIUM 40 MG: 100 INJECTION SUBCUTANEOUS at 08:04

## 2023-02-01 RX ADMIN — METOCLOPRAMIDE HYDROCHLORIDE 10 MG: 5 INJECTION INTRAMUSCULAR; INTRAVENOUS at 09:18

## 2023-02-01 RX ADMIN — OCTREOTIDE ACETATE 100 MCG: 100 INJECTION, SOLUTION INTRAVENOUS; SUBCUTANEOUS at 22:25

## 2023-02-01 RX ADMIN — DIBASIC SODIUM PHOSPHATE, MONOBASIC POTASSIUM PHOSPHATE AND MONOBASIC SODIUM PHOSPHATE 2 TABLET: 852; 155; 130 TABLET ORAL at 09:17

## 2023-02-01 RX ADMIN — SODIUM CHLORIDE, PRESERVATIVE FREE 10 ML: 5 INJECTION INTRAVENOUS at 20:52

## 2023-02-01 RX ADMIN — ATORVASTATIN CALCIUM 10 MG: 10 TABLET, FILM COATED ORAL at 20:05

## 2023-02-01 RX ADMIN — LEVOTHYROXINE SODIUM 100 MCG: 0.1 TABLET ORAL at 07:51

## 2023-02-01 RX ADMIN — METOCLOPRAMIDE HYDROCHLORIDE 10 MG: 5 INJECTION INTRAMUSCULAR; INTRAVENOUS at 14:32

## 2023-02-01 RX ADMIN — POLYETHYLENE GLYCOL 3350 17 G: 17 POWDER, FOR SOLUTION ORAL at 09:17

## 2023-02-01 RX ADMIN — PANTOPRAZOLE SODIUM 40 MG: 40 TABLET, DELAYED RELEASE ORAL at 07:51

## 2023-02-01 RX ADMIN — OCTREOTIDE ACETATE 100 MCG: 100 INJECTION, SOLUTION INTRAVENOUS; SUBCUTANEOUS at 08:05

## 2023-02-01 RX ADMIN — METOCLOPRAMIDE HYDROCHLORIDE 10 MG: 5 INJECTION INTRAMUSCULAR; INTRAVENOUS at 20:05

## 2023-02-01 RX ADMIN — HYDROCODONE BITARTRATE AND ACETAMINOPHEN 1 TABLET: 5; 325 TABLET ORAL at 09:17

## 2023-02-01 RX ADMIN — BISACODYL 10 MG: 10 SUPPOSITORY RECTAL at 14:32

## 2023-02-01 ASSESSMENT — PAIN DESCRIPTION - DESCRIPTORS
DESCRIPTORS: ACHING;SORE
DESCRIPTORS: ACHING;SORE

## 2023-02-01 ASSESSMENT — PAIN DESCRIPTION - FREQUENCY: FREQUENCY: INTERMITTENT

## 2023-02-01 ASSESSMENT — PAIN DESCRIPTION - ORIENTATION
ORIENTATION: LEFT

## 2023-02-01 ASSESSMENT — PAIN DESCRIPTION - LOCATION
LOCATION: ABDOMEN

## 2023-02-01 ASSESSMENT — PAIN DESCRIPTION - PAIN TYPE: TYPE: SURGICAL PAIN

## 2023-02-01 ASSESSMENT — PAIN DESCRIPTION - ONSET: ONSET: GRADUAL

## 2023-02-01 ASSESSMENT — PAIN SCALES - GENERAL
PAINLEVEL_OUTOF10: 4
PAINLEVEL_OUTOF10: 8
PAINLEVEL_OUTOF10: 5
PAINLEVEL_OUTOF10: 2

## 2023-02-01 ASSESSMENT — PAIN - FUNCTIONAL ASSESSMENT: PAIN_FUNCTIONAL_ASSESSMENT: ACTIVITIES ARE NOT PREVENTED

## 2023-02-01 NOTE — PROGRESS NOTES
General Surgery   Daily Progress Note  Patient: Alejandra Hammonds      CC: pancreatic mass, intractable NV    SUBJECTIVE:   No acute events overnight. Pt tolerating FLD, ambulating in the hallway, pain is controlled. ROS:   A 14 point review of systems was conducted, significant findings as noted above. All other systems negative. OBJECTIVE:    PHYSICAL EXAM:    Vitals:    01/31/23 1915 01/1957 01/31/23 2315 02/01/23 0315   BP: 131/61 131/62 110/63 109/64   Pulse: 67  71 70   Resp: 18  16 16   Temp: 98.1 °F (36.7 °C)  98 °F (36.7 °C) 97.9 °F (36.6 °C)   TempSrc: Oral  Oral Oral   SpO2: 93%  92% 94%   Weight:       Height:           General appearance:  no acute distress  Eyes: No scleral icterus, EOM grossly intact  Neck: trachea midline, no JVD, neck supple  Chest/Lungs: normal effort with no accessory muscle use, no signs of respiratory distress, on RA  Cardiovascular: RRR   Abdomen: Soft, appropriately-tender, incision c/d/i and well approximated with Dermabond  Skin: warm and dry, no rashes  Extremities: no edema, no cyanosis  Neuro: A&Ox3, no focal deficits, sensation intact    LABS:   Recent Labs     01/30/23  0640 01/31/23  0543   WBC 5.3 8.6   HGB 10.5* 9.7*   HCT 30.5* 28.9*   MCV 90.2 91.5    196          Recent Labs     01/30/23  0640 01/31/23  0543   * 135*   K 3.3* 3.8    102   CO2 25 26   PHOS  --  3.0   BUN 12 12   CREATININE 0.5* 0.7          Recent Labs     01/30/23  0640   AST 22   ALT 15   BILITOT 0.6   ALKPHOS 75        No results for input(s): LIPASE, AMYLASE in the last 72 hours. Recent Labs     01/30/23  0640   PROT 9.4*        No results for input(s): CKTOTAL, CKMB, CKMBINDEX, TROPONINI in the last 72 hours.       ASSESSMENT & PLAN:   This is a 74 y.o. female with a diagnosis of pancreatic mass and intractable nausea with vomiting s/p pancreatic mass and abdominal lymph node excision, laparoscopic gastric band and port removal.    - Continue FLD for one week, and then progress to one week soft diet   - continue pain medications  - continue octreotide for total 3 days  - Continue ambulation,   - PT/OT scored 18 and 23  - Likely dispo tomorrow per pt request.     Marrian Lesches, DO   PGY1, General Surgery  02/01/23  6:22 AM  Contact via [employer]Gene    I have personally performed the medical history, physical exam and medical decision making and agree with all pertinent clinical information unless otherwise noted.      Audrey Gary MD  Surgery Attending

## 2023-02-01 NOTE — PLAN OF CARE
Problem: Discharge Planning  Goal: Discharge to home or other facility with appropriate resources  Flowsheets (Taken 2/1/2023 1542)  Discharge to home or other facility with appropriate resources:   Identify barriers to discharge with patient and caregiver   Arrange for needed discharge resources and transportation as appropriate  Note: Patient up with walker, discussed with case management will need walker on discharge. Patient denies need for home care.  to help when patient discharged. Problem: Pain  Goal: Verbalizes/displays adequate comfort level or baseline comfort level  Flowsheets (Taken 2/1/2023 1542)  Verbalizes/displays adequate comfort level or baseline comfort level:   Encourage patient to monitor pain and request assistance   Assess pain using appropriate pain scale  Note: Patient continues with some abdomen surgical pain, controlled with PRN Vicodin. Encouraged patient to notify staff of any uncontrolled pain. Will continue to monitor. Problem: Skin/Tissue Integrity  Goal: Absence of new skin breakdown  Description: 1. Monitor for areas of redness and/or skin breakdown  2. Assess vascular access sites hourly  3. Every 4-6 hours minimum:  Change oxygen saturation probe site  4. Every 4-6 hours:  If on nasal continuous positive airway pressure, respiratory therapy assess nares and determine need for appliance change or resting period. Note: Skin intact except for surgical incisions abdomen. Patient turns on own. Up to chair and ambulating in hsu. Skin kept clean and dry.   No new breakdown/

## 2023-02-01 NOTE — PROGRESS NOTES
Physical Therapy  Facility/Department: [facility] SURGERY  Physical Therapy Daily Treatment    Name: Rebeka Eckert  :   MRN: [id number]  Date of Service: 2023    Discharge Recommendations: Rebeka Eckert scored a 18/24 on the AM-PAC short mobility form. Current research shows that an AM-PAC score of 18 or greater is typically associated with a discharge to the patient's home setting. Based on the patient's AM-PAC score and their current functional mobility deficits, it is recommended that the patient have 2-3 sessions per week of Physical Therapy at d/c to increase the patient's independence. At this time, this patient demonstrates the endurance and safety to discharge home. Please see assessment section for further patient specific details. If patient discharges prior to next session this note will serve as a discharge summary. Please see below for the latest assessment towards goals. Patient Diagnosis(es): The primary encounter diagnosis was Acute postoperative pain of abdomen. Diagnoses of Pancreatic mass, LAP-BAND surgery status, and Nausea and vomiting, unspecified vomiting type were also pertinent to this visit. Past Medical History:  has a past medical history of Asthma, Diabetes mellitus ([address] Elevated liver function tests, Essential hypertension, GERD (gastroesophageal reflux disease), History of cystocele, Hyperglycemia, Hyperlipidemia, Hypertension, Hypothyroidism, Migraine, Morbid obesity due to excess calories ([address] Reina's neuroma of left foot, Non morbid obesity due to excess calories, Obesity, Obstructive sleep apnea, Osteoarthritis, Prediabetes, Rectocele, Seasonal allergic rhinitis, Snoring, Status post gastric banding, and Uterovaginal prolapse. Past Surgical History:  has a past surgical history that includes Abdominoplasty ();  section (); Cholecystectomy; Wrist surgery (Right, 1991); Lap Band (01/15/2010);  Upper gastrointestinal endoscopy (2015);  section; Colonoscopy (2013); Hysterectomy, vaginal (2012); Cystoscopy (2012); bladder suspension (2012); Soft Tissue Biopsy (Left, 2010); Upper gastrointestinal endoscopy (10/16/2010); Tonsillectomy (); Foot neuroma surgery (Left, 10/25/2016); Foot neuroma surgery (Left, 2020); Breast biopsy; Pancreatectomy (N/A, 2023); and Gastric Band (N/A, 2023). Assessment   Assessment: Patient demonstrates increased tolerance to activity this date and completed stair assessment. Patient to complete stairs and all mobility at home with 24 hr assist initially but anticipate safe return to PLOF as pain resolves. Will continue to follow per POC while admitted. Treatment Diagnosis: decreased functional mobility  Requires PT Follow-Up: Yes  Activity Tolerance  Activity Tolerance: Patient tolerated evaluation without incident     Plan   Physcial Therapy Plan  General Plan:  (2-5)  Safety Devices  Type of Devices: Left in chair, Call light within reach, Chair alarm in place, Nurse notified     Restrictions  Position Activity Restriction  Other position/activity restrictions: up as tolerated     Subjective   General  Chart Reviewed: Yes  Patient assessed for rehabilitation services?: Yes  Additional Pertinent Hx: Abdominal MRI 2022 reveals Hypervascular 1.7 cm well marginated exophytic lesion in the pancreatic tail. Patient presents s/p ROBOTIC / LAPAROSCOPIC DISTAL PANCREATECTOMY. POSSIBLE SPLENECTOMY, POSSIBLE OPEN  . LAPAROSCOPIC GASTRIC BAND AND PORT REMOVAL 2023. PMH: HTN, hyperglycemia, hypothyroidism, GERD, asthma, DM, hyperlipidemia. Diagnosis: pancreatic mass  Subjective  Subjective: Patient seated in recliner at arrival and agreeable to therapy. States she is feeling better today than yesterday.          Social/Functional History  Social/Functional History  Lives With: Spouse  Type of Home: House  Home Layout: Multi-level, Bed/Bath upstairs (can use bedroom and bathroom)  Home Access: Stairs to enter with rails  Entrance Stairs - Number of Steps: 3  Bathroom Shower/Tub: Tub/Shower unit  Bathroom Toilet: Standard  Bathroom Equipment: None (sink next to toilet)  Home Equipment: None  Has the patient had two or more falls in the past year or any fall with injury in the past year?: No  Receives Help From: Family  ADL Assistance: [address] [place] [address] Avenue: Independent  Homemaking Responsibilities: Yes  Ambulation Assistance: Independent  Transfer Assistance: Independent  Active : Yes  Mode of Transportation: Car  Occupation: Retired  Additional Comments: daughter can help during day,  can help at night  Vision/Hearing  Vision  Vision: Impaired  Vision Exceptions: Wears glasses for reading  Hearing  Hearing: Within functional limits    Cognition   Orientation  Overall Orientation Status: Within Functional Limits     Objective   Gross Assessment  AROM: Within functional limits  Strength:  Within functional limits   Transfers  Sit to Stand: Contact guard assistance  Stand to Sit: Contact guard assistance  Ambulation  Device: Rolling Walker  Assistance: Stand by assistance  Gait Deviations: Slow Apple  Distance: 400'  Comments: 100' + stairs + 300'  Stairs/Curb  Stairs?: Yes  Stairs  # Steps : 4  Stairs Height: 8\"  Rails: Bilateral  Assistance: Contact guard assistance;Minimal assistance  Comment: initial Josh for first ascending step but then CGA for the rest, says she has more pain with descending and it feels better with pressure applied to incision site     Balance  Sitting - Static: Good  Standing - Static: Good  Standing - Dynamic: Good    AM-PAC Score  AM-PAC Inpatient Mobility Raw Score : 18 (02/01/23 1424)  AM-PAC Inpatient T-Scale Score : 43.63 (02/01/23 1424)  Mobility Inpatient CMS 0-100% Score: 46.58 (02/01/23 1424)  Mobility Inpatient CMS G-Code Modifier : CK (02/01/23 1424)    Goals  Short Term Goals  Time Frame for Short Term Goals: discharge  Short Term Goal 1: supine<>sit SBA, ongoing  Short Term Goal 2: sit<>stand SBA, ongiong  Short Term Goal 3: tolerate stair assessment Josh, met  Short Term Goal 4: ambulate >300ft with/without assistive device supervision, ongoing  Patient Goals   Patient Goals : not stated     Education  Patient Education  Education Given To: Patient  Education Provided: Role of Therapy  Education Method: Verbal  Barriers to Learning: None  Education Outcome: Verbalized understanding      Therapy Time   Individual Concurrent Group Co-treatment   Time In 1400         Time Out 1423         Minutes 23          Timed Code Treatment Minutes:   23    Total Treatment Minutes:  One Emilee Bullock SPT

## 2023-02-01 NOTE — PROGRESS NOTES
Patient alert and oriented. VSS Patient c/o pain, Percocet with benefits. CDI surgical sites YAMIL. Patient ambulates in hsu x2. Patient in bed lowest position call light and bedside table within reach. All needs are met at this time. Patient aware to call if any help needed. Will continue to monitor  /64   Pulse 70   Temp 97.9 °F (36.6 °C) (Oral)   Resp 16   Ht 5' 3\" (1.6 m)   Wt 206 lb 3.2 oz (93.5 kg)   SpO2 94%   BMI 36.53 kg/m²

## 2023-02-01 NOTE — PROGRESS NOTES
Patient alert and oriented. Vitals stable. Surgical pain controlled with prn vicodin. Abdomen soft, bowel sounds active. Tolerating full liquid diet. Up to ambulate with walker, gait steady but slow. One time ducolax suppository given, no bm but patient passed a lot of flatus. Up to chair, daughter visiting. Chair alarm on, will monitor.

## 2023-02-01 NOTE — PROGRESS NOTES
General Surgery   Daily Progress Note  Patient: Zenia Felty      CC: pancreatic mass, intractable NV    SUBJECTIVE:   No acute events overnight. AVSS. Pt tolerating FLD, ambulating in the hallway, pain is controlled. Passing gas. No BM.    ROS:   A 14 point review of systems was conducted, significant findings as noted above. All other systems negative. OBJECTIVE:    PHYSICAL EXAM:    Vitals:    01/31/23 1915 01/1955 01/31/23 2315 02/01/23 0315   BP: 131/61 131/62 110/63 109/64   Pulse: 67  71 70   Resp: 18  16 16   Temp: 98.1 °F (36.7 °C)  98 °F (36.7 °C) 97.9 °F (36.6 °C)   TempSrc: Oral  Oral Oral   SpO2: 93%  92% 94%   Weight:       Height:           General appearance:  no acute distress  Eyes: No scleral icterus, EOM grossly intact  Neck: trachea midline, no JVD, neck supple  Chest/Lungs: normal effort with no accessory muscle use, no signs of respiratory distress, on RA  Cardiovascular: RRR   Abdomen: Soft, appropriately-tender, incision c/d/i and well approximated with Dermabond  Skin: warm and dry, no rashes  Extremities: no edema, no cyanosis  Neuro: A&Ox3, no focal deficits, sensation intact    LABS:   Recent Labs     01/30/23  0640 01/31/23  0543   WBC 5.3 8.6   HGB 10.5* 9.7*   HCT 30.5* 28.9*   MCV 90.2 91.5    196          Recent Labs     01/30/23  0640 01/31/23  0543   * 135*   K 3.3* 3.8    102   CO2 25 26   PHOS  --  3.0   BUN 12 12   CREATININE 0.5* 0.7          Recent Labs     01/30/23  0640   AST 22   ALT 15   BILITOT 0.6   ALKPHOS 75        No results for input(s): LIPASE, AMYLASE in the last 72 hours. Recent Labs     01/30/23  0640   PROT 9.4*        No results for input(s): CKTOTAL, CKMB, CKMBINDEX, TROPONINI in the last 72 hours.       ASSESSMENT & PLAN:   This is a 74 y.o. female with a diagnosis of pancreatic mass and intractable nausea with vomiting s/p pancreatic mass and abdominal lymph node excision, laparoscopic gastric band and port removal.    - Continue FLD for one week, and then progress to one week soft diet   - continue pain medications  - continue octreotide for total 3 days per surgical oncology   - Continue ambulation,   - PT/OT scored 18 and 23  - Likely dispo tomorrow per pt request.     Althea Gold MD  PGY2, General Surgery  02/01/23  6:36 AM  [facility]  [phone]

## 2023-02-02 VITALS
HEIGHT: 63 IN | RESPIRATION RATE: 16 BRPM | TEMPERATURE: 98 F | SYSTOLIC BLOOD PRESSURE: 109 MMHG | HEART RATE: 83 BPM | OXYGEN SATURATION: 94 % | DIASTOLIC BLOOD PRESSURE: 55 MMHG | BODY MASS INDEX: 36.54 KG/M2 | WEIGHT: 206.2 LBS

## 2023-02-02 LAB
ALBUMIN SERPL-MCNC: 3 G/DL (ref 3.4–5)
ALP BLD-CCNC: 89 U/L (ref 40–129)
ALT SERPL-CCNC: 56 U/L (ref 10–40)
ANION GAP SERPL CALCULATED.3IONS-SCNC: 8 MMOL/L (ref 3–16)
AST SERPL-CCNC: 49 U/L (ref 15–37)
BASOPHILS ABSOLUTE: 0 K/UL (ref 0–0.2)
BASOPHILS RELATIVE PERCENT: 0.3 %
BILIRUB SERPL-MCNC: 0.4 MG/DL (ref 0–1)
BILIRUBIN DIRECT: <0.2 MG/DL (ref 0–0.3)
BILIRUBIN, INDIRECT: ABNORMAL MG/DL (ref 0–1)
BUN BLDV-MCNC: 6 MG/DL (ref 7–20)
CALCIUM SERPL-MCNC: 9.2 MG/DL (ref 8.3–10.6)
CHLORIDE BLD-SCNC: 101 MMOL/L (ref 99–110)
CO2: 30 MMOL/L (ref 21–32)
CREAT SERPL-MCNC: 0.5 MG/DL (ref 0.6–1.2)
EOSINOPHILS ABSOLUTE: 0.2 K/UL (ref 0–0.6)
EOSINOPHILS RELATIVE PERCENT: 2.4 %
GFR SERPL CREATININE-BSD FRML MDRD: >60 ML/MIN/{1.73_M2}
GLUCOSE BLD-MCNC: 105 MG/DL (ref 70–99)
HCT VFR BLD CALC: 30 % (ref 36–48)
HEMOGLOBIN: 10.1 G/DL (ref 12–16)
LYMPHOCYTES ABSOLUTE: 2.5 K/UL (ref 1–5.1)
LYMPHOCYTES RELATIVE PERCENT: 34.8 %
MAGNESIUM: 1.9 MG/DL (ref 1.8–2.4)
MCH RBC QN AUTO: 31.1 PG (ref 26–34)
MCHC RBC AUTO-ENTMCNC: 33.6 G/DL (ref 31–36)
MCV RBC AUTO: 92.3 FL (ref 80–100)
MONOCYTES ABSOLUTE: 0.6 K/UL (ref 0–1.3)
MONOCYTES RELATIVE PERCENT: 8.3 %
NEUTROPHILS ABSOLUTE: 3.8 K/UL (ref 1.7–7.7)
NEUTROPHILS RELATIVE PERCENT: 54.2 %
PDW BLD-RTO: 14 % (ref 12.4–15.4)
PHOSPHORUS: 3.4 MG/DL (ref 2.5–4.9)
PLATELET # BLD: 178 K/UL (ref 135–450)
PMV BLD AUTO: 9.5 FL (ref 5–10.5)
POTASSIUM SERPL-SCNC: 3.6 MMOL/L (ref 3.5–5.1)
RBC # BLD: 3.25 M/UL (ref 4–5.2)
SODIUM BLD-SCNC: 139 MMOL/L (ref 136–145)
TOTAL PROTEIN: 8.6 G/DL (ref 6.4–8.2)
WBC # BLD: 7.1 K/UL (ref 4–11)

## 2023-02-02 PROCEDURE — 80069 RENAL FUNCTION PANEL: CPT

## 2023-02-02 PROCEDURE — 99024 POSTOP FOLLOW-UP VISIT: CPT | Performed by: SURGERY

## 2023-02-02 PROCEDURE — 36415 COLL VENOUS BLD VENIPUNCTURE: CPT

## 2023-02-02 PROCEDURE — 80076 HEPATIC FUNCTION PANEL: CPT

## 2023-02-02 PROCEDURE — 83735 ASSAY OF MAGNESIUM: CPT

## 2023-02-02 PROCEDURE — 2580000003 HC RX 258: Performed by: SURGERY

## 2023-02-02 PROCEDURE — 85025 COMPLETE CBC W/AUTO DIFF WBC: CPT

## 2023-02-02 PROCEDURE — 6370000000 HC RX 637 (ALT 250 FOR IP): Performed by: STUDENT IN AN ORGANIZED HEALTH CARE EDUCATION/TRAINING PROGRAM

## 2023-02-02 PROCEDURE — 6370000000 HC RX 637 (ALT 250 FOR IP): Performed by: NURSE PRACTITIONER

## 2023-02-02 PROCEDURE — 6360000002 HC RX W HCPCS: Performed by: NURSE PRACTITIONER

## 2023-02-02 PROCEDURE — 6360000002 HC RX W HCPCS: Performed by: SURGERY

## 2023-02-02 PROCEDURE — 6370000000 HC RX 637 (ALT 250 FOR IP): Performed by: SURGERY

## 2023-02-02 RX ORDER — POTASSIUM CHLORIDE 20 MEQ/1
40 TABLET, EXTENDED RELEASE ORAL ONCE
Status: COMPLETED | OUTPATIENT
Start: 2023-02-02 | End: 2023-02-02

## 2023-02-02 RX ADMIN — POTASSIUM CHLORIDE 40 MEQ: 1500 TABLET, EXTENDED RELEASE ORAL at 09:02

## 2023-02-02 RX ADMIN — ENOXAPARIN SODIUM 40 MG: 100 INJECTION SUBCUTANEOUS at 08:58

## 2023-02-02 RX ADMIN — METOCLOPRAMIDE HYDROCHLORIDE 10 MG: 5 INJECTION INTRAMUSCULAR; INTRAVENOUS at 08:58

## 2023-02-02 RX ADMIN — METOCLOPRAMIDE HYDROCHLORIDE 10 MG: 5 INJECTION INTRAMUSCULAR; INTRAVENOUS at 04:28

## 2023-02-02 RX ADMIN — LEVOTHYROXINE SODIUM 100 MCG: 0.1 TABLET ORAL at 09:02

## 2023-02-02 RX ADMIN — PANTOPRAZOLE SODIUM 40 MG: 40 TABLET, DELAYED RELEASE ORAL at 09:02

## 2023-02-02 RX ADMIN — SODIUM CHLORIDE, PRESERVATIVE FREE 10 ML: 5 INJECTION INTRAVENOUS at 08:58

## 2023-02-02 RX ADMIN — OCTREOTIDE ACETATE 100 MCG: 100 INJECTION, SOLUTION INTRAVENOUS; SUBCUTANEOUS at 09:02

## 2023-02-02 RX ADMIN — DOCUSATE SODIUM 100 MG: 100 CAPSULE, LIQUID FILLED ORAL at 08:58

## 2023-02-02 RX ADMIN — POLYETHYLENE GLYCOL 3350 17 G: 17 POWDER, FOR SOLUTION ORAL at 08:58

## 2023-02-02 ASSESSMENT — PAIN SCALES - GENERAL
PAINLEVEL_OUTOF10: 0
PAINLEVEL_OUTOF10: 1
PAINLEVEL_OUTOF10: 0

## 2023-02-02 NOTE — PROGRESS NOTES
Bariatric Surgery   Daily Progress Note  Patient: Adine Filter      CC: pancreatic mass, intractable NV    SUBJECTIVE:   Rested well overnight. Afebrile, HDS. Tolerating full liquids. Passing gas. OOB to chair. Pain controlled. Voiding. ROS:   A 14 point review of systems was conducted, significant findings as noted above. All other systems negative. OBJECTIVE:    PHYSICAL EXAM:    Vitals:    02/01/23 1456 02/01/23 2003 02/01/23 2319 02/02/23 0330   BP: 106/70 126/72 125/71 123/70   Pulse: 65 70 72 70   Resp: 16 16 18 16   Temp: 98 °F (36.7 °C) 98.7 °F (37.1 °C) 98.6 °F (37 °C) 98.4 °F (36.9 °C)   TempSrc: Oral Oral Oral Oral   SpO2: 94% 95%  94%   Weight:       Height:           General appearance:  no acute distress  Eyes: No scleral icterus, EOM grossly intact  Neck: trachea midline, no JVD, neck supple  Chest/Lungs: normal effort with no accessory muscle use, no signs of respiratory distress, on RA  Cardiovascular: RRR   Abdomen: Soft, appropriately-tender, incision c/d/i and well approximated with Dermabond  Skin: warm and dry, no rashes  Extremities: no edema, no cyanosis  Neuro: A&Ox3, no focal deficits, sensation intact    LABS:   Recent Labs     02/01/23  0526 02/02/23  0534   WBC 8.7 7.1   HGB 9.5* 10.1*   HCT 28.2* 30.0*   MCV 92.1 92.3    178          Recent Labs     01/31/23  0543 02/01/23  0526   * 137   K 3.8 3.7    102   CO2 26 28   PHOS 3.0 1.7*   BUN 12 10   CREATININE 0.7 0.6          Recent Labs     01/30/23  0640   AST 22   ALT 15   BILITOT 0.6   ALKPHOS 75        No results for input(s): LIPASE, AMYLASE in the last 72 hours. Recent Labs     01/30/23  0640   PROT 9.4*        No results for input(s): CKTOTAL, CKMB, CKMBINDEX, TROPONINI in the last 72 hours.       ASSESSMENT & PLAN:   This is a 74 y.o. female with a diagnosis of pancreatic mass and intractable nausea with vomiting s/p pancreatic mass and abdominal lymph node excision, laparoscopic gastric band and port removal.    - Continue FLD for one week, and then progress to one week soft diet   - continue pain medications  - continue octreotide for total 3 days.  Last does this morning (2/2)  - Continue ambulation,   - PT/OT scored 18 and 23  - Dispo home this afternoon with      Abilio Peng MD  PGY1, General Surgery  02/02/23  6:28 AM  [facility]#[phone]

## 2023-02-02 NOTE — PROGRESS NOTES
Patient alert and oriented. VSS Patient c/o pain Pandora given. Patient stated I feel like my throat is closing up , patient assessed , vitals taken  Resident [facility] notified. Patient in bed lowest position call light and bedside table within reach. All needs are met at this time. Patient aware to call if any help needed. Will continue to monitor  /70   Pulse 70   Temp 98.4 °F (36.9 °C) (Oral)   Resp 16   Ht 5' 3\" (1.6 m)   Wt 206 lb 3.2 oz (93.5 kg)   SpO2 94%   BMI 36.53 kg/m²

## 2023-02-02 NOTE — PROGRESS NOTES
General Surgery   Daily Progress Note  Patient: Rosanne Whitfield      CC: pancreatic mass, intractable NV    SUBJECTIVE:   Rested well overnight. Afebrile, HDS. Tolerating full liquids. Passing gas. OOB to chair. Pain controlled. Voiding. ROS:   A 14 point review of systems was conducted, significant findings as noted above. All other systems negative. OBJECTIVE:    PHYSICAL EXAM:    Vitals:    02/01/23 1456 02/01/23 2003 02/01/23 2319 02/02/23 0330   BP: 106/70 126/72 125/71 123/70   Pulse: 65 70 72 70   Resp: 16 16 18 16   Temp: 98 °F (36.7 °C) 98.7 °F (37.1 °C) 98.6 °F (37 °C) 98.4 °F (36.9 °C)   TempSrc: Oral Oral Oral Oral   SpO2: 94% 95%  94%   Weight:       Height:           General appearance:  no acute distress  Eyes: No scleral icterus, EOM grossly intact  Neck: trachea midline, no JVD, neck supple  Chest/Lungs: normal effort with no accessory muscle use, no signs of respiratory distress, on RA  Cardiovascular: RRR   Abdomen: Soft, appropriately-tender, incision c/d/i and well approximated with Dermabond  Skin: warm and dry, no rashes  Extremities: no edema, no cyanosis  Neuro: A&Ox3, no focal deficits, sensation intact    LABS:   Recent Labs     02/01/23  0526 02/02/23  0534   WBC 8.7 7.1   HGB 9.5* 10.1*   HCT 28.2* 30.0*   MCV 92.1 92.3    178          Recent Labs     01/31/23  0543 02/01/23  0526   * 137   K 3.8 3.7    102   CO2 26 28   PHOS 3.0 1.7*   BUN 12 10   CREATININE 0.7 0.6          Recent Labs     01/30/23  0640   AST 22   ALT 15   BILITOT 0.6   ALKPHOS 75        No results for input(s): LIPASE, AMYLASE in the last 72 hours. Recent Labs     01/30/23  0640   PROT 9.4*        No results for input(s): CKTOTAL, CKMB, CKMBINDEX, TROPONINI in the last 72 hours.       ASSESSMENT & PLAN:   This is a 74 y.o. female with a diagnosis of pancreatic mass and intractable nausea with vomiting s/p pancreatic mass and abdominal lymph node excision, laparoscopic gastric band and port removal.    - Continue FLD for one week, and then progress to one week soft diet   - continue pain medications  - continue octreotide for total 3 days. Last does this morning (2/2)  - Continue ambulation,   - PT/OT scored 18 and 23  - Dispo home this afternoon with      Med Arnett MD  PGY1, General Surgery  02/02/23  6:24 AM  [facility]#[phone]    I have personally performed the medical history, physical exam and medical decision making and agree with all pertinent clinical information unless otherwise noted.      Cayla Limon MD  Surgery Attending

## 2023-02-02 NOTE — CARE COORDINATION
11:57 AM  Jonathan ST ordered and to be delivered by Christopher Tripp, prior to dc.      Electronically signed by Tiana Madden RN, CM on 2/2/2023 at 11:57 AM.  Phone: [phone]  Fax: [phone]

## 2023-02-02 NOTE — PROGRESS NOTES
General Surgery   Daily Progress Note  Patient: Sherry Baum      CC: pancreatic mass, intractable NV    SUBJECTIVE:   Doing well, tolerating diet     ROS:   A 14 point review of systems was conducted, significant findings as noted above. All other systems negative. OBJECTIVE:    PHYSICAL EXAM:    Vitals:    02/01/23 1456 02/01/23 2003 02/01/23 2319 02/02/23 0330   BP: 106/70 126/72 125/71 123/70   Pulse: 65 70 72 70   Resp: 16 16 18 16   Temp: 98 °F (36.7 °C) 98.7 °F (37.1 °C) 98.6 °F (37 °C) 98.4 °F (36.9 °C)   TempSrc: Oral Oral Oral Oral   SpO2: 94% 95%  94%   Weight:       Height:           General appearance:  no acute distress  Eyes: No scleral icterus, EOM grossly intact  Neck: trachea midline, no JVD, neck supple  Chest/Lungs: normal effort with no accessory muscle use, no signs of respiratory distress, on RA  Cardiovascular: RRR   Abdomen: Soft, appropriately-tender, incision c/d/i and well approximated with Dermabond  Skin: warm and dry, no rashes  Extremities: no edema, no cyanosis  Neuro: A&Ox3, no focal deficits, sensation intact    LABS:   Recent Labs     01/31/23  0543 02/01/23  0526   WBC 8.6 8.7   HGB 9.7* 9.5*   HCT 28.9* 28.2*   MCV 91.5 92.1    177          Recent Labs     01/31/23  0543 02/01/23  0526   * 137   K 3.8 3.7    102   CO2 26 28   PHOS 3.0 1.7*   BUN 12 10   CREATININE 0.7 0.6          Recent Labs     01/30/23  0640   AST 22   ALT 15   BILITOT 0.6   ALKPHOS 75        No results for input(s): LIPASE, AMYLASE in the last 72 hours. Recent Labs     01/30/23  0640   PROT 9.4*        No results for input(s): CKTOTAL, CKMB, CKMBINDEX, TROPONINI in the last 72 hours.       ASSESSMENT & PLAN:   This is a 74 y.o. female with a diagnosis of pancreatic mass and intractable nausea with vomiting s/p pancreatic mass and abdominal lymph node excision, laparoscopic gastric band and port removal.    - Continue FLD for one week, and then progress to one week soft diet   - continue pain medications  - continue octreotide for total 3 days (Day 3/3)  - Continue ambulation,   - PT/OT scored 18 and 23  - Dispo today    Gali Burton DO   PGY1, General Surgery  02/02/23  6:18 AM  Contact via TPACK    I have personally performed the medical history, physical exam and medical decision making and agree with all pertinent clinical information unless otherwise noted.      Mariama Winslow MD  Surgery Attending

## 2023-02-02 NOTE — PROGRESS NOTES
Patient alert and oriented x 4. VSS. Denies any pain. Ambulated in the hsu for more than 600 feet. Up in the chair, BLE up on the chair. Voided yellow, clear, without odor urine. Tolerated full liquid diet well. Discharged instructions given. Patient verbalized understanding. Татьяна Tao provided, take home. Patient wheeled into the front entrance where her caregiver/daughter able to ride patient home.

## 2023-02-02 NOTE — PROGRESS NOTES
02/02/23 1508   Encounter Summary   Encounter Overview/Reason  Initial Encounter   Service Provided For: Patient   Referral/Consult From: Tami   Last Encounter  02/02/23  (glennye)   Complexity of Encounter Low   Assessment/Intervention/Outcome   Assessment Unable to assess  (sleep?)   Staff True Ross MA, [facility]

## 2023-02-03 NOTE — PROGRESS NOTES
Physician Progress Note      PATIENTKaryle Neu  CSN #:                  [id number]  :                       1955  ADMIT DATE:       2023 6:01 AM  DISCH DATE:        2023 2:55 PM  RESPONDING  PROVIDER #:        [facility] ORTA          QUERY TEXT:    Pt admitted -  with Pancreatic neuroendocrine tumor, s/p excision of   pancreatic mass & abdominal lymph node. Surgical pathology consistent with   Abdominal lymph node-plasma cell neoplasm. If possible, please document in   progress notes and d/c summary a correlating diagnosis to explain pathology   findings: The medical record reflects the following:  Risk Factors: pancreatic neuroendocrine tumor  Clinical Indicators: Path:  Abdominal lymph node- The staining  pattern   supports a lambda light chain restricted plasma cell population  and the   findings are consistent with involvement of the lymph node with a plasma cell   neoplasm. Pancreatic mass- pancreatic neuroendocrine tumor (Pan-NET, grade 2). 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 Query created by:  Clifford Stockton on 2/3/2023 11:27 AM      Electronically signed by:  [facility] ORTA 2/3/2023 11:35 AM

## 2023-02-06 ENCOUNTER — TELEPHONE (OUTPATIENT)
Dept: BARIATRICS/WEIGHT MGMT | Age: 68
End: 2023-02-06

## 2023-02-06 ENCOUNTER — TELEPHONE (OUTPATIENT)
Dept: FAMILY MEDICINE CLINIC | Age: 68
End: 2023-02-06

## 2023-02-06 RX ORDER — ATORVASTATIN CALCIUM 20 MG/1
TABLET, FILM COATED ORAL
COMMUNITY
Start: 2023-01-18

## 2023-02-06 NOTE — TELEPHONE ENCOUNTER
Care Transitions Initial Follow Up Call    Outreach made within 2 business days of discharge: Yes    Patient: Ko Queen Patient : 1955   MRN: [id number]  Reason for Admission: There are no discharge diagnoses documented for the most recent discharge. Discharge Date: 23       Spoke with: Wili Gong    Discharge department/facility: [facility] Patient Contact:  Was patient able to fill all prescriptions: Yes  Was patient instructed to bring all medications to the follow-up visit: Yes  Is patient taking all medications as directed in the discharge summary?  Yes  Does patient understand their discharge instructions: Yes  Does patient have questions or concerns that need addressed prior to 7-14 day follow up office visit: no    Scheduled appointment with PCP within 7-14 days    Follow Up  Future Appointments   Date Time Provider Ori Woodson   2023  9:45 AM MD Tyler Barnett S/ON [facility]   3/1/2023  8:15 AM Mike Grover DO [facility]   3/23/2023  2:50 PM Radu Jesus MD [facility]       Sudheer Mix MA

## 2023-02-06 NOTE — TELEPHONE ENCOUNTER
Surgery Type: lap band removal     Surgery Date: 1/30/23    Surgeon: Royce Oswald  This call was facilitated with language services - Cynthia Quivers #[phone]    The patient was contacted to follow up on their recent surgery. The following topics were reviewed:    [x] Hydration is Adequate            [x]Patient is getting at least 48-64 oz of fluids a day, not including protein shakes. Pt reports taking in sf jello   8oz apple juice   Half cup broth  60 oz alkaline water    []Not Consuming Adequate Protein          [x]Consuming less than 60-80 grams of protein a day       [x] Food intake is  appropriate - pt is following full liquid diet   2 x [place] yogurt plain (12g each)  Blended oatmeal with milk  Chix broth   Not using any protein shakes  [x] Taking in 4-6 oz at a time  [x] Pt is eating 2-3 times per day   [] Following the 30-30-30 rule. N/A         [x] Pain relief techniques utilized  [] Taking pain medication as prescribed  [] Utilizing Lidoderm patches (if prescribed)  [x] Taking Tylenol instead of prescription pain medication  [] Wearing abdominal binder - no  [] Using ice for incisional pain - no    [x] Activity is appropriate  [x] Walking 10 minutes out of every hour - pt reports she is up every hour but doesn't have much room to walk around, encouraged movement  [x] Avoiding heavy lifting (>10lbs)  [x] Utilizing their incentive spirometer - pt is using 10 times every 2 hours      [x] Issues with Nausea/Vomiting/Reflux  [x] Using Zofran PRN for nausea/vomiting - did not need it the last 2 days  []Taking Prilosec for reflux    [] Issues with Constipation          [x] Pt has had a BM since surgery - loose and small amount last night. We discussed that this may become more normal as diet is advanced.               [x] Pt does not feel constipated  [] Tried Colace   [x] Tried Miralax - used last night          [] Multivitamin capsule started n/a    All questions and concerns addressed including pt had questions about how long she needed to remain on soft diet. Advised full liquid is for the 1st week, and soft diet is for the 2nd week (which she plans to start tomorrow), and may advance to regular textured foods at 2 weeks post op as tolerated. Encouraged patient to focus on small, frequent protein rich foods and to shoot for minimum of 60 grams protein per day to promote recovery. We discussed ok to use protein shakes (look for low sugar, low fat, less than 300 calories) to meet total protein needs. Patient was asked to please fill out hospital survey if she receives one in the mail.

## 2023-02-07 ENCOUNTER — OFFICE VISIT (OUTPATIENT)
Dept: SURGERY | Age: 68
End: 2023-02-07

## 2023-02-07 VITALS
TEMPERATURE: 97.8 F | HEIGHT: 63 IN | DIASTOLIC BLOOD PRESSURE: 81 MMHG | WEIGHT: 203.8 LBS | SYSTOLIC BLOOD PRESSURE: 142 MMHG | BODY MASS INDEX: 36.11 KG/M2 | HEART RATE: 74 BPM

## 2023-02-07 DIAGNOSIS — E66.01 CLASS 2 SEVERE OBESITY DUE TO EXCESS CALORIES WITH SERIOUS COMORBIDITY AND BODY MASS INDEX (BMI) OF 37.0 TO 37.9 IN ADULT (HCC): ICD-10-CM

## 2023-02-07 DIAGNOSIS — K86.89 PANCREATIC MASS: ICD-10-CM

## 2023-02-07 DIAGNOSIS — D3A.8 NEUROENDOCRINE TUMOR: Primary | ICD-10-CM

## 2023-02-07 PROCEDURE — 99024 POSTOP FOLLOW-UP VISIT: CPT | Performed by: NURSE PRACTITIONER

## 2023-02-07 NOTE — DISCHARGE SUMMARY
Patient ID:  Apolonia De Luna  [id number]  74 y.o.  1955    Admit date: 1/30/2023    Discharge date and time: 2/2/23    Admitting Physician: Radha Palacio MD     Discharge Physician: same    Admission Diagnoses: Pancreatic mass [K86.89]  LAP-BAND surgery status [Z98.84]  Nausea and vomiting, unspecified vomiting type [R11.2]    Discharge Diagnoses: same    Admission Condition: fair    Discharged Condition: stable    Indication for Admission: Surgery: pancreatic mass and abdominal lymph node excision, laparoscopic gastric band and port removal, IV hydration, monitoring, pain and nausea management    Hospital Course: 74 y.o. female admitted with pancreatic mass, lap band and abdominal lymph node who underwent Robotic/laparoscopic pancreatic mass and abdominal lymph node excision, laparoscopic gastric band and port removal.    Surgery was uneventful and she was admitted to post-operative surgical floor in stable condition for IV hydration, monitoring and pain and nausea management. She was monitored over the next couple of days for tolerance of full liquid diet. On POD # 3 her pain was tolerable on po pain medication and she was taking adequate po. She was instructed to continue full liquids for one week and then advance to soft food. She was discharged in stable condition. Treatments: IV hydration        Disposition: home    Patient Instructions: Activity: activity as tolerated and no driving while on analgesics  Diet: full liquids for one week then soft diet  Wound Care: as directed    Follow-up with Dr. Jose Aparicio in two weeks.         Signed:  ORIN Raman CNP  2/7/2023  8:56 AM

## 2023-02-07 NOTE — PROGRESS NOTES
[facility] Surgical Oncology and General Surgery  [facility], [address] [place] [address] Drive [address]  Phone: [phone]  Fax: [phone]      Visit Date: 2/7/23    Subjective:     Sachin Bishop is a 74 y.o. female here for postoperative visit after robotic/laparoscopic pancreatic mass and abdominal lymph node excision, laparoscopic gastric band and port removal 1/30/23. Accompanied today by her daughter. States she is feeling well today. Tolerating diet. Pain well controlled. Having bowel movements. Objective:     BP (!) 142/81 (Site: Right Upper Arm, Position: Sitting)   Pulse 74   Temp 97.8 °F (36.6 °C) (Temporal)   Ht 5' 3\" (1.6 m)   Wt 203 lb 12.8 oz (92.4 kg)   BMI 36.10 kg/m²     General:  Alert, oriented x 3, cooperative. Skin: Skin color, texture, turgor normal.    Lymph nodes: Cervical, supraclavicular, and axillary nodes normal.   HENT:  Normocephalic, without obvious abnormality. Moist mucus membranes. No icterus. Lungs: No respiratory distress. Abdomen: Soft, non-tender. No masses,  No organomegaly. Abdominal incision healing well. Path:      A. Gastric band with port:      Foreign body, clinically gastric band with port ( gross only ). B. Abdominal lymph node:      One lymph node with abundant plasma cells. Evaluation for plasma cell neoplasm is currently pending and results        will be reflected in a supplemental report. No evidence of metastatic neuroendocrine tumor ( 0/1). C. Resection, pancreatic mass:      Portion of pancreas involved with pancreatic neuroendocrine tumor        (Pan-NET, grade 2); with lesion approximately 1.8 cm in greatest        extent. Lesion remains localized to the pancreatic parenchyma and is        histologically excised. See case comment and synoptic report. Assessment/Plan:      Diagnosis Orders   1. Neuroendocrine tumor        2. Pancreatic mass        3.  Class 2 severe obesity due to excess calories with serious comorbidity and body mass index (BMI) of 37.0 to 37.9 in [facility])            Doing well with no post-operative complications. Discussed ways to reduce risk of hernia formation. Recommend follow-up with Dr. Shimon Ordaz in 3 months.        Electronically signed by ORIN Moise CNP on 2/9/2023 at 1:15 PM

## 2023-02-16 PROBLEM — R59.0 ABDOMINAL LYMPHADENOPATHY: Status: ACTIVE | Noted: 2023-02-16

## 2023-02-17 NOTE — OP NOTE
Operative Note      Patient: Layla Noyola  YOB: 1955  MRN: [id number]    Date of Procedure: 1/30/2023    Pre-Op Diagnosis: Pancreatic mass [K86.89]  LAP-BAND surgery status [Z98.84]  Nausea and vomiting, unspecified vomiting type [R11.2]    Post-Op Diagnosis: Same       Procedure(s):  ROBOTIC / LAPAROSCOPIC PANCREATIC MASS AND ABDOMINAL LYMPH NODE EXCISION  LAPAROSCOPIC GASTRIC BAND AND PORT REMOVAL    Surgeon(s): DO Rocio Toussaint MD    Assistant:   Surgical Assistant: Carolyn Riggs; Frida De La Rosa  Resident: Luis Eduardo Bolden MD    Anesthesia: General    Estimated Blood Loss (mL): Minimal    Complications: None    Specimens:   ID Type Source Tests Collected by Time Destination   A : GASTRIC BAND AND PORT (GROSS ONLY) Hardware [facility] MD 1/30/2023 9302    B : ABDOMINAL LYMPH NODE Tissue Tissue SURGICAL PATHOLOGY Rocio Maya MD 1/30/2023 1045    C : PANCREAS MASS Tissue Tissue SURGICAL PATHOLOGY Rocio Maya MD 1/30/2023 1055        Operative findings: No evidence of mets or ascitic fluid on diagnostic laparoscopy. Exophytic mass in the tail of pancreas. No obvious extension of mass into surrounding organs. Indications for Surgery: Patient was being worked up for myeloma and and further investigations showed pancreatic mass. EUS was performed which showed neuroendocrine tumor without any evidence of invasion to adjacent structures that will contradict resection. Work up did not show any distant metastasis. Discussion of the planned procedure with the patient along with explanation of any risks and benefits were completed, all questions were answered and he agreed to proceed with surgery. Details of Procedure: Patient was brought to the operating room and placed in supine position. All pressure points were padded and AT boots were applied bilaterally.  General anesthesia was induced and the patient was intubated without complication. Patient gastric band removed by Dr. Caitlyn Llanes, please see his complete op note. Additional appropriate robotic ports placed. Patient cart of the robotic system was docked. I moved to the surgeon console. Gastrocolic omentum was opened and the lesser sac was entered. Lesser sac opened widely. The posterior adhesions between the stomach and pancreas were released by retracting the stomach up. Now dissection continued towards spleen. There was lot of fibrosis given previous lap band placement. Adhesiolysis was done to expose tail of pancreas. Now we can clearly see the  pancreas. Further exposure to take down short gastrics and scar tissue was getting very difficult. I can see mass easily and is exophytic. Decision made to excise the mass with some pancreatic tissue. I can clearly see the borders of this tumor. ICG was also given and tumor was visualized separately from pancreas. Dissection was done with monopolar scissors and bipolar maryland device to separate mass with pancreatic tissue from rest of the pancreas. Feeding blood vessels were carefully controled. Entire mass was excised in intact manner. Hemostasis carefully achieved. Fibrin glue was placed. A well vascularized omental flap based on left gastroepiploic artery was made and placed over mass excision site. Now we proceeded with excising an abnormally appearing lymph node for better staging of cancer and also due to myeloma. Dissection was done all around the lymph node carefully. Entire lymph node was excised and sent to pathology. A 19 Thai channel drain was placed in left upper quadrant close to pancreatic stump. Specimen was placed in an organ retrieval bag  and was removed uneventfully. All the ports removed. The 12-mm port site was closed with Vicryl suture. All the port sites, skin was closed with subcuticular 4-0 Monocryl suture. DermaFlex was applied. The patient was extubated and transferred to recovery uneventfully. Sponge and needle count was reported to be correct x2 at the end of the procedure. I was present for the entire procedure.      Mimi Liriano MD   Surgical Oncologist  Department of [address] [place] [address]

## 2023-03-06 ENCOUNTER — OFFICE VISIT (OUTPATIENT)
Dept: BARIATRICS/WEIGHT MGMT | Age: 68
End: 2023-03-06

## 2023-03-06 VITALS — BODY MASS INDEX: 36.86 KG/M2 | WEIGHT: 208 LBS | HEIGHT: 63 IN

## 2023-03-06 DIAGNOSIS — Z98.84 LAP-BAND SURGERY STATUS: Primary | ICD-10-CM

## 2023-03-06 PROCEDURE — 99024 POSTOP FOLLOW-UP VISIT: CPT | Performed by: SURGERY

## 2023-03-06 NOTE — PROGRESS NOTES
Patient doing well  No N/V/F/C  Tolerating diet   Having regular BM's    Incisions C/D/I    5 weeks s/p Lap-band removal    Resume regular diet and activity    F/U IMELDAN    Sujit Casas

## 2023-03-23 ENCOUNTER — OFFICE VISIT (OUTPATIENT)
Dept: ENDOCRINOLOGY | Age: 68
End: 2023-03-23
Payer: COMMERCIAL

## 2023-03-23 VITALS
HEIGHT: 63 IN | HEART RATE: 82 BPM | BODY MASS INDEX: 37.39 KG/M2 | TEMPERATURE: 98 F | RESPIRATION RATE: 14 BRPM | DIASTOLIC BLOOD PRESSURE: 69 MMHG | OXYGEN SATURATION: 97 % | WEIGHT: 211 LBS | SYSTOLIC BLOOD PRESSURE: 129 MMHG

## 2023-03-23 DIAGNOSIS — E11.9 CONTROLLED TYPE 2 DIABETES MELLITUS WITHOUT COMPLICATION, WITHOUT LONG-TERM CURRENT USE OF INSULIN (HCC): Primary | ICD-10-CM

## 2023-03-23 DIAGNOSIS — E03.9 ACQUIRED HYPOTHYROIDISM: ICD-10-CM

## 2023-03-23 DIAGNOSIS — E04.1 THYROID NODULE: ICD-10-CM

## 2023-03-23 DIAGNOSIS — E78.2 MIXED HYPERLIPIDEMIA: ICD-10-CM

## 2023-03-23 DIAGNOSIS — E66.9 CLASS 2 OBESITY WITH BODY MASS INDEX (BMI) OF 37.0 TO 37.9 IN ADULT, UNSPECIFIED OBESITY TYPE, UNSPECIFIED WHETHER SERIOUS COMORBIDITY PRESENT: ICD-10-CM

## 2023-03-23 DIAGNOSIS — Z98.84 STATUS POST GASTRIC BANDING: ICD-10-CM

## 2023-03-23 DIAGNOSIS — E55.9 VITAMIN D DEFICIENCY: ICD-10-CM

## 2023-03-23 DIAGNOSIS — R79.89 ELEVATED LIVER FUNCTION TESTS: ICD-10-CM

## 2023-03-23 PROCEDURE — 1123F ACP DISCUSS/DSCN MKR DOCD: CPT | Performed by: INTERNAL MEDICINE

## 2023-03-23 PROCEDURE — 99214 OFFICE O/P EST MOD 30 MIN: CPT | Performed by: INTERNAL MEDICINE

## 2023-03-23 PROCEDURE — 3074F SYST BP LT 130 MM HG: CPT | Performed by: INTERNAL MEDICINE

## 2023-03-23 PROCEDURE — 3078F DIAST BP <80 MM HG: CPT | Performed by: INTERNAL MEDICINE

## 2023-03-23 RX ORDER — SEMAGLUTIDE 1.34 MG/ML
INJECTION, SOLUTION SUBCUTANEOUS
Qty: 1 ADJUSTABLE DOSE PRE-FILLED PEN SYRINGE | Refills: 3 | Status: SHIPPED | OUTPATIENT
Start: 2023-03-23

## 2023-03-23 RX ORDER — LEVOTHYROXINE SODIUM 0.1 MG/1
TABLET ORAL
Qty: 90 TABLET | Refills: 1 | Status: SHIPPED | OUTPATIENT
Start: 2023-03-23

## 2023-03-23 NOTE — PROGRESS NOTES
AM     02/02/2023 05:34 AM     Lab Results   Component Value Date/Time     02/02/2023 05:34 AM    K 3.6 02/02/2023 05:34 AM     02/02/2023 05:34 AM    CO2 30 02/02/2023 05:34 AM    BUN 6 02/02/2023 05:34 AM    CREATININE 0.5 02/02/2023 05:34 AM    GLUCOSE 105 02/02/2023 05:34 AM    CALCIUM 9.2 02/02/2023 05:34 AM    PROT 8.6 02/02/2023 05:34 AM    PROT 8.2 02/17/2010 12:28 PM    LABALBU 3.0 02/02/2023 05:34 AM    BILITOT 0.4 02/02/2023 05:34 AM    ALKPHOS 89 02/02/2023 05:34 AM    AST 49 02/02/2023 05:34 AM    ALT 56 02/02/2023 05:34 AM    LABGLOM >60 02/02/2023 05:34 AM    GFRAA >60 07/28/2022 08:37 AM    GFRAA >60 02/17/2010 12:28 PM    AGRATIO 0.6 01/30/2023 06:40 AM    GLOB 5.3 06/23/2021 03:33 PM     Lab Results   Component Value Date/Time    TSH 2.02 11/08/2022 09:10 AM    FT3 2.9 07/28/2022 08:37 AM     Lab Results   Component Value Date/Time    LABA1C 5.9 11/08/2022 09:10 AM     Lab Results   Component Value Date/Time    .6 11/08/2022 09:10 AM     Lab Results   Component Value Date/Time    CHOL 161 11/08/2022 09:10 AM     Lab Results   Component Value Date/Time    TRIG 90 11/08/2022 09:10 AM     Lab Results   Component Value Date/Time    HDL 47 11/08/2022 09:10 AM     Lab Results   Component Value Date/Time    LDLCALC 96 11/08/2022 09:10 AM     Lab Results   Component Value Date/Time    LABVLDL 18 11/08/2022 09:10 AM    VLDL 22 12/02/2017 12:00 AM     No results found for: CHOLHDLRATIO  Lab Results   Component Value Date/Time    LABMICR 1.60 07/28/2022 08:37 AM     Lab Results   Component Value Date/Time    VITD25 31.3 11/08/2022 09:10 AM      Review of Systems:  Constitutional: no fatigue, no fever, no recent weight gain, has intentional recent weight loss, no changes in appetite  Eyes: no eye pain, no change in vision, no eye redness, no eye irritation, no double vision  Ears, nose, throat: no nasal congestion, no sore throat, no earache, no decrease in hearing, has hoarseness, no

## 2023-03-27 DIAGNOSIS — E11.9 CONTROLLED TYPE 2 DIABETES MELLITUS WITHOUT COMPLICATION, WITHOUT LONG-TERM CURRENT USE OF INSULIN (HCC): Primary | ICD-10-CM

## 2023-03-27 NOTE — TELEPHONE ENCOUNTER
Patient's pharmacy does not have Ozempic in [address] Please resend to [facility] in [facility] (pended) and call patient once its sent.

## 2023-03-28 RX ORDER — SEMAGLUTIDE 1.34 MG/ML
INJECTION, SOLUTION SUBCUTANEOUS
Qty: 1 ADJUSTABLE DOSE PRE-FILLED PEN SYRINGE | Refills: 3 | Status: SHIPPED | OUTPATIENT
Start: 2023-03-28

## 2023-03-29 ENCOUNTER — TELEPHONE (OUTPATIENT)
Dept: ENDOCRINOLOGY | Age: 68
End: 2023-03-29

## 2023-03-29 NOTE — TELEPHONE ENCOUNTER
Please review attached. Pt needs a PA for Ozempic. 4. Status post gastric banding  Had lap banding, lost 100 lbs, then started gaining again. Now is watching her diet, lost weight. 5. Obesity  Fluctuating weight. Eats healthy. Has neuroma, asthma. Difficult to exercise    ASSESSMENT/PLAN:   1. Diabetes Mellitus Type 2, controlled, without complications  Call for results  Continue Metformin 500 mg bid (was only taking one)  Start Ozempic  Changed the diet  Lost 50 lbs. Maintains. HbA1C 5.9-6.3-6.9-5.8-6.0-5.8-5.9  Glucose -435-97  - Hemoglobin A1C; Future  Had neuroendocrine tumor surgery.

## 2023-04-20 ENCOUNTER — OFFICE VISIT (OUTPATIENT)
Dept: PRIMARY CARE CLINIC | Age: 68
End: 2023-04-20
Payer: COMMERCIAL

## 2023-04-20 VITALS
TEMPERATURE: 98.3 F | RESPIRATION RATE: 17 BRPM | WEIGHT: 211.6 LBS | OXYGEN SATURATION: 95 % | HEART RATE: 85 BPM | DIASTOLIC BLOOD PRESSURE: 66 MMHG | BODY MASS INDEX: 37.49 KG/M2 | SYSTOLIC BLOOD PRESSURE: 106 MMHG | HEIGHT: 63 IN

## 2023-04-20 DIAGNOSIS — Z13.820 ENCOUNTER FOR SCREENING FOR OSTEOPOROSIS: ICD-10-CM

## 2023-04-20 DIAGNOSIS — E11.9 CONTROLLED TYPE 2 DIABETES MELLITUS WITHOUT COMPLICATION, WITHOUT LONG-TERM CURRENT USE OF INSULIN (HCC): ICD-10-CM

## 2023-04-20 DIAGNOSIS — Z00.00 HEALTHCARE MAINTENANCE: Primary | ICD-10-CM

## 2023-04-20 DIAGNOSIS — Z11.59 NEED FOR HEPATITIS B SCREENING TEST: ICD-10-CM

## 2023-04-20 DIAGNOSIS — Z78.0 POSTMENOPAUSAL: ICD-10-CM

## 2023-04-20 DIAGNOSIS — C90.01 MULTIPLE MYELOMA IN REMISSION (HCC): ICD-10-CM

## 2023-04-20 DIAGNOSIS — Z23 IMMUNIZATION DUE: ICD-10-CM

## 2023-04-20 PROBLEM — E66.9 CLASS 2 OBESITY WITH BODY MASS INDEX (BMI) OF 37.0 TO 37.9 IN ADULT: Status: RESOLVED | Noted: 2023-03-23 | Resolved: 2023-04-20

## 2023-04-20 PROBLEM — R93.5 ABNORMAL CT OF THE ABDOMEN: Status: RESOLVED | Noted: 2023-01-04 | Resolved: 2023-04-20

## 2023-04-20 PROBLEM — M75.21 BICEPS TENDINITIS ON RIGHT: Status: RESOLVED | Noted: 2017-06-16 | Resolved: 2023-04-20

## 2023-04-20 PROBLEM — Z98.84 STATUS POST GASTRIC BANDING: Status: RESOLVED | Noted: 2023-03-23 | Resolved: 2023-04-20

## 2023-04-20 PROBLEM — D47.2 MONOCLONAL GAMMOPATHY OF UNKNOWN SIGNIFICANCE (MGUS): Status: RESOLVED | Noted: 2022-12-19 | Resolved: 2023-04-20

## 2023-04-20 PROBLEM — M54.2 PAIN RADIATING TO NECK: Status: RESOLVED | Noted: 2017-06-16 | Resolved: 2023-04-20

## 2023-04-20 PROBLEM — G89.29 CHRONIC RIGHT SHOULDER PAIN: Status: RESOLVED | Noted: 2017-06-16 | Resolved: 2023-04-20

## 2023-04-20 PROBLEM — R20.2 NUMBNESS AND TINGLING OF BOTH UPPER EXTREMITIES WHILE SLEEPING: Status: RESOLVED | Noted: 2017-06-16 | Resolved: 2023-04-20

## 2023-04-20 PROBLEM — E66.9 CLASS 2 OBESITY WITH BODY MASS INDEX (BMI) OF 39.0 TO 39.9 IN ADULT: Status: RESOLVED | Noted: 2018-01-18 | Resolved: 2023-04-20

## 2023-04-20 PROBLEM — M75.51 BURSITIS OF RIGHT SHOULDER: Status: RESOLVED | Noted: 2017-06-16 | Resolved: 2023-04-20

## 2023-04-20 PROBLEM — M75.81 TENDINITIS OF RIGHT ROTATOR CUFF: Status: RESOLVED | Noted: 2017-06-16 | Resolved: 2023-04-20

## 2023-04-20 PROBLEM — D47.2 SMOLDERING MULTIPLE MYELOMA: Status: RESOLVED | Noted: 2022-12-19 | Resolved: 2023-04-20

## 2023-04-20 PROBLEM — M25.511 CHRONIC RIGHT SHOULDER PAIN: Status: RESOLVED | Noted: 2017-06-16 | Resolved: 2023-04-20

## 2023-04-20 PROBLEM — E66.9 CLASS 2 OBESITY IN ADULT: Status: RESOLVED | Noted: 2021-12-02 | Resolved: 2023-04-20

## 2023-04-20 PROBLEM — R59.0 ABDOMINAL LYMPHADENOPATHY: Status: RESOLVED | Noted: 2023-02-16 | Resolved: 2023-04-20

## 2023-04-20 PROBLEM — M19.011 ARTHRITIS OF RIGHT ACROMIOCLAVICULAR JOINT: Status: RESOLVED | Noted: 2017-06-16 | Resolved: 2023-04-20

## 2023-04-20 PROBLEM — R20.0 NUMBNESS AND TINGLING OF BOTH UPPER EXTREMITIES WHILE SLEEPING: Status: RESOLVED | Noted: 2017-06-16 | Resolved: 2023-04-20

## 2023-04-20 PROBLEM — M79.621 PAIN IN RIGHT UPPER ARM: Status: RESOLVED | Noted: 2017-06-12 | Resolved: 2023-04-20

## 2023-04-20 LAB — HBA1C MFR BLD: 6.2 % (ref ?–1)

## 2023-04-20 PROCEDURE — 83036 HEMOGLOBIN GLYCOSYLATED A1C: CPT | Performed by: FAMILY MEDICINE

## 2023-04-20 PROCEDURE — 3078F DIAST BP <80 MM HG: CPT | Performed by: FAMILY MEDICINE

## 2023-04-20 PROCEDURE — G0438 PPPS, INITIAL VISIT: HCPCS | Performed by: FAMILY MEDICINE

## 2023-04-20 PROCEDURE — 3074F SYST BP LT 130 MM HG: CPT | Performed by: FAMILY MEDICINE

## 2023-04-20 PROCEDURE — 90715 TDAP VACCINE 7 YRS/> IM: CPT | Performed by: FAMILY MEDICINE

## 2023-04-20 PROCEDURE — 90677 PCV20 VACCINE IM: CPT | Performed by: FAMILY MEDICINE

## 2023-04-20 PROCEDURE — 1123F ACP DISCUSS/DSCN MKR DOCD: CPT | Performed by: FAMILY MEDICINE

## 2023-04-20 PROCEDURE — 90471 IMMUNIZATION ADMIN: CPT | Performed by: FAMILY MEDICINE

## 2023-04-20 PROCEDURE — 90472 IMMUNIZATION ADMIN EACH ADD: CPT | Performed by: FAMILY MEDICINE

## 2023-04-20 PROCEDURE — 3044F HG A1C LEVEL LT 7.0%: CPT | Performed by: FAMILY MEDICINE

## 2023-04-20 RX ORDER — TIRZEPATIDE 5 MG/.5ML
5 INJECTION, SOLUTION SUBCUTANEOUS WEEKLY
Qty: 4 ADJUSTABLE DOSE PRE-FILLED PEN SYRINGE | Refills: 0 | Status: SHIPPED | OUTPATIENT
Start: 2023-05-18

## 2023-04-20 RX ORDER — ATORVASTATIN CALCIUM 20 MG/1
20 TABLET, FILM COATED ORAL DAILY
Qty: 90 TABLET | Refills: 0 | Status: SHIPPED | OUTPATIENT
Start: 2023-04-20

## 2023-04-20 RX ORDER — TIRZEPATIDE 2.5 MG/.5ML
2.5 INJECTION, SOLUTION SUBCUTANEOUS WEEKLY
Qty: 4 ADJUSTABLE DOSE PRE-FILLED PEN SYRINGE | Refills: 0 | COMMUNITY
Start: 2023-04-20

## 2023-04-20 RX ORDER — AMLODIPINE BESYLATE 5 MG/1
5 TABLET ORAL DAILY
COMMUNITY
End: 2023-04-20

## 2023-04-20 RX ORDER — ATORVASTATIN CALCIUM 20 MG/1
20 TABLET, FILM COATED ORAL DAILY
Qty: 30 TABLET | Refills: 3 | Status: SHIPPED | OUTPATIENT
Start: 2023-04-20 | End: 2023-04-20

## 2023-04-20 ASSESSMENT — ENCOUNTER SYMPTOMS
RHINORRHEA: 0
ABDOMINAL PAIN: 0
SHORTNESS OF BREATH: 0
BLOOD IN STOOL: 0
VOMITING: 0
COLOR CHANGE: 0
COUGH: 0
DIARRHEA: 0
NAUSEA: 0

## 2023-04-20 ASSESSMENT — PATIENT HEALTH QUESTIONNAIRE - PHQ9
SUM OF ALL RESPONSES TO PHQ QUESTIONS 1-9: 0
1. LITTLE INTEREST OR PLEASURE IN DOING THINGS: 0
SUM OF ALL RESPONSES TO PHQ9 QUESTIONS 1 & 2: 0
2. FEELING DOWN, DEPRESSED OR HOPELESS: 0
SUM OF ALL RESPONSES TO PHQ QUESTIONS 1-9: 0

## 2023-04-20 NOTE — PROGRESS NOTES
Karley Painting is a 74y.o. year old female here for:    Chief Complaint:    Chief Complaint   Patient presents with    New Patient    Establish Care    Annual Exam     Patient's Sex: female Medicare Member ID: [id number] - (Commercial)    Ethnicity:     / Mimi Carmel  Race:    / Mimi Carmel    Provider Name:  Yessenia Olsen M.D. [facility] [address], [address] [place] [address]  Dept: [phone]  Dept Fax: [phone]    Provider ID Type: NPI  Billing Provider ID:  [id number]    Patient Care Team:  Óscar Rodrigues MD as PCP - General (Family Medicine)  Óscar Rodrigues MD as PCP - Empaneled Provider  Violet Flores MD (Otolaryngology)  Slava Llanos MD (Gastroenterology)  Efraín Fong MD (Urogynecology)  Jorge Arreola MD (Orthopedic Surgery)  Mendoza White MD (Orthopedic Surgery)  Kaylin Schmitt MD (Gastroenterology)  Kalyn See MD (Orthopedic Surgery)    Current concerns:    #Multiple Myeloma  - Context: Following with Heme-Onc for this. Has f/u in May for this.   - Progression: Reports doing well  - Associated Symptoms: Per ROS as otherwise stated in this note    Medical History:  Past Medical History:   Diagnosis Date    Arthritis of right acromioclavicular joint 6/16/2017    Asthma     Biceps tendinitis on right 6/16/2017    Bursitis of right shoulder 6/16/2017    Causalgia of upper limb 3/18/2014    Chronic right shoulder pain 6/16/2017    Diabetes mellitus ([facility] [address]     Elevated liver function tests 10/21/2016    Essential hypertension 12/12/2016    GERD (gastroesophageal reflux disease)     History of cystocele 09/12/2016    Hyperlipidemia     Hypertension     Hypothyroidism 10/21/2016    Incomplete tear of right rotator cuff 10/25/2016    Formatting of this note might be different from the original. [address] [facility] 2007 CLAIM    Intrinsic sphincter deficiency (ISD) 11/8/2012    Midline cystocele 6/26/2012    Migraine     Reina's neuroma of left

## 2023-04-20 NOTE — ASSESSMENT & PLAN NOTE
Poorly controlled and would like to try to lose weight. Mounjaro sample provided today, will start with 2.5 mg qweekly for 4 weeks, then increase to 5 mg qweekly thereafter. Reminder set.

## 2023-04-20 NOTE — ASSESSMENT & PLAN NOTE
Overall doing well. Age/risk appropriate immunizations and screenings provided as per orders below. Other problems as otherwise noted.

## 2023-04-20 NOTE — ASSESSMENT & PLAN NOTE
Well controlled.  Please see plans and goals below:    Goals   [x] Glycemic control: A1c 7%   [x] Blood pressure control: BP < 140/90 (consider < 130/80 if high risk)  [x] Metformin   [x] Moderate-high intensity statin (if ASCVD, or age > 36)  [ ] Smoking cessation - N/A     Labs    -Last HbA1c 6.2, next due July 2023 (q 3 months, or longer if at goal)  -Microalbumin due July 2023 (annually)  -Cr or eGFR due July 2023 (annually)  -Lipids due July 2023 (consider annually)  -LFT's due July 2023 (consider annually)  -B12 due July 2023 (annually if anemia or neuropathy or on Metformin)  -TSH due July 2023      Preventive Measures   [x] Diabetes education teaching (all new diagnoses)  [ ] Optometry/Ophthalmology referral (all pts w DM) - reports this was done, will call to find out where, reminder set  [x] PCV 20(all pts w DM)  [ ] Hep B 2-dose (all pts w DM if age < 61, risk/benefit discussion if age >57)  - will check for immunity first    [ ] dose 1    [ ] dose 2 four weeks after dose 1  [ ] Baby aspirin (if ASCVD, or age > 48) - will look to see what lipids show in July  [x] ACE/ARB (if CKD, or albumin-creatinine-ratio > 30)  -Annual comprehensive foot exam - Done today  -Annual eye exam - as above  -Annual flu shot - Not in season

## 2023-04-24 ENCOUNTER — TELEPHONE (OUTPATIENT)
Dept: PRIMARY CARE CLINIC | Age: 68
End: 2023-04-24

## 2023-04-26 ENCOUNTER — TELEPHONE (OUTPATIENT)
Dept: ADMINISTRATIVE | Age: 68
End: 2023-04-26

## 2023-05-11 ENCOUNTER — HOSPITAL ENCOUNTER (OUTPATIENT)
Dept: GENERAL RADIOLOGY | Age: 68
Discharge: HOME | End: 2023-05-11
Payer: COMMERCIAL

## 2023-05-11 DIAGNOSIS — Z78.0 POSTMENOPAUSAL: ICD-10-CM

## 2023-05-11 DIAGNOSIS — Z13.820 ENCOUNTER FOR SCREENING FOR OSTEOPOROSIS: ICD-10-CM

## 2023-05-11 PROCEDURE — 77080 DXA BONE DENSITY AXIAL: CPT

## 2023-05-20 PROBLEM — Z13.820 ENCOUNTER FOR SCREENING FOR OSTEOPOROSIS: Status: RESOLVED | Noted: 2023-04-20 | Resolved: 2023-05-20

## 2023-05-20 PROBLEM — Z00.00 HEALTHCARE MAINTENANCE: Status: RESOLVED | Noted: 2023-04-20 | Resolved: 2023-05-20

## 2023-05-20 PROBLEM — Z11.59 NEED FOR HEPATITIS B SCREENING TEST: Status: RESOLVED | Noted: 2023-04-20 | Resolved: 2023-05-20

## 2023-06-19 ENCOUNTER — OFFICE VISIT (OUTPATIENT)
Dept: PRIMARY CARE CLINIC | Age: 68
End: 2023-06-19
Payer: COMMERCIAL

## 2023-06-19 VITALS
RESPIRATION RATE: 14 BRPM | WEIGHT: 204.2 LBS | HEART RATE: 77 BPM | OXYGEN SATURATION: 95 % | DIASTOLIC BLOOD PRESSURE: 60 MMHG | HEIGHT: 63 IN | TEMPERATURE: 98 F | SYSTOLIC BLOOD PRESSURE: 126 MMHG | BODY MASS INDEX: 36.18 KG/M2

## 2023-06-19 DIAGNOSIS — E11.9 CONTROLLED TYPE 2 DIABETES MELLITUS WITHOUT COMPLICATION, WITHOUT LONG-TERM CURRENT USE OF INSULIN (HCC): Primary | ICD-10-CM

## 2023-06-19 PROCEDURE — 3078F DIAST BP <80 MM HG: CPT | Performed by: FAMILY MEDICINE

## 2023-06-19 PROCEDURE — 1123F ACP DISCUSS/DSCN MKR DOCD: CPT | Performed by: FAMILY MEDICINE

## 2023-06-19 PROCEDURE — 3044F HG A1C LEVEL LT 7.0%: CPT | Performed by: FAMILY MEDICINE

## 2023-06-19 PROCEDURE — 99213 OFFICE O/P EST LOW 20 MIN: CPT | Performed by: FAMILY MEDICINE

## 2023-06-19 PROCEDURE — 3074F SYST BP LT 130 MM HG: CPT | Performed by: FAMILY MEDICINE

## 2023-06-19 RX ORDER — SEMAGLUTIDE 1.34 MG/ML
0.25 INJECTION, SOLUTION SUBCUTANEOUS WEEKLY
Qty: 3 ADJUSTABLE DOSE PRE-FILLED PEN SYRINGE | Refills: 0 | Status: SHIPPED | OUTPATIENT
Start: 2023-06-19

## 2023-06-19 ASSESSMENT — ENCOUNTER SYMPTOMS
RHINORRHEA: 0
BLOOD IN STOOL: 0
ABDOMINAL PAIN: 0
DIARRHEA: 0
NAUSEA: 0
COLOR CHANGE: 0
SHORTNESS OF BREATH: 0
COUGH: 0
VOMITING: 0

## 2023-06-19 NOTE — PROGRESS NOTES
Status: She is alert. Psychiatric:         Mood and Affect: Mood normal.         Behavior: Behavior normal.     Body mass index is 36.17 kg/m². Labs:  No results found for this or any previous visit (from the past 672 hour(s)). Imaging:  No orders to display     ASSESSMENT & PLAN:    Michele Coronado is a 74y.o. year old female here for Diabetes. The following is a summary of the plan made at this visit:    1. Controlled type 2 diabetes mellitus without complication, without long-term current use of insulin ([address]  Assessment & Plan:  Well controlled. Will trial Ozempic and remove Mounjaro for cost issues. Coupon provided (no samples in office). Reminder set to check in 4 weeks from now to determine how it is going and increase dose PRN. Call back/ED precautions discussed.       Orders:  -     Semaglutide,0.25 or 0.5MG/DOS, (OZEMPIC, 0.25 OR 0.5 MG/DOSE,) 2 MG/1.5ML SOPN; Inject 0.25 mg into the skin once a week, Disp-3 Adjustable Dose Pre-filled Pen Syringe, R-0Normal

## 2023-06-19 NOTE — ASSESSMENT & PLAN NOTE
Well controlled. Will trial Ozempic and remove Mounjaro for cost issues. Coupon provided (no samples in office). Reminder set to check in 4 weeks from now to determine how it is going and increase dose PRN. Call back/ED precautions discussed.

## 2023-06-22 DIAGNOSIS — I10 ESSENTIAL HYPERTENSION: Chronic | ICD-10-CM

## 2023-06-23 ENCOUNTER — TELEPHONE (OUTPATIENT)
Dept: PRIMARY CARE CLINIC | Age: 68
End: 2023-06-23

## 2023-06-23 DIAGNOSIS — I10 ESSENTIAL HYPERTENSION: Chronic | ICD-10-CM

## 2023-06-23 RX ORDER — LOSARTAN POTASSIUM 50 MG/1
50 TABLET ORAL DAILY
Qty: 90 TABLET | Refills: 1 | Status: SHIPPED | OUTPATIENT
Start: 2023-06-23

## 2023-06-23 RX ORDER — LOSARTAN POTASSIUM 50 MG/1
50 TABLET ORAL DAILY
Qty: 90 TABLET | Refills: 1 | OUTPATIENT
Start: 2023-06-23

## 2023-06-23 NOTE — TELEPHONE ENCOUNTER
Pt stopped into the office to check on the status of this request.  Pt sts almost out of this medication.

## 2023-06-23 NOTE — TELEPHONE ENCOUNTER
Requested Prescriptions     Pending Prescriptions Disp Refills    losartan (COZAAR) 50 MG tablet [Pharmacy Med Name: LOSARTAN 50MG TABLETS] 90 tablet 1     Sig: TAKE 1 TABLET BY MOUTH DAILY          Last Office Visit: 1/19/2023     Next Office Visit: Visit date not found     Last Labs:

## 2023-06-26 ENCOUNTER — TELEPHONE (OUTPATIENT)
Dept: PRIMARY CARE CLINIC | Age: 68
End: 2023-06-26

## 2023-06-26 DIAGNOSIS — I10 ESSENTIAL HYPERTENSION: Chronic | ICD-10-CM

## 2023-06-26 RX ORDER — LOSARTAN POTASSIUM 50 MG/1
50 TABLET ORAL DAILY
Qty: 90 TABLET | Refills: 1 | Status: SHIPPED | OUTPATIENT
Start: 2023-06-26

## 2023-07-17 ENCOUNTER — TELEPHONE (OUTPATIENT)
Dept: PRIMARY CARE CLINIC | Age: 68
End: 2023-07-17

## 2023-07-17 NOTE — TELEPHONE ENCOUNTER
Spoke with patient she says she is doing well and thinks her current dose of Ozempic is working fine. Advised pt. To call if any further questions/concerns.

## 2023-07-17 NOTE — TELEPHONE ENCOUNTER
----- Message from Tahira Keita MD sent at 6/19/2023  3:24 PM EDT -----  Call to see how she did with ozempic and if to send dose increase?

## 2023-07-26 ENCOUNTER — TELEPHONE (OUTPATIENT)
Dept: PRIMARY CARE CLINIC | Age: 68
End: 2023-07-26

## 2023-07-26 NOTE — TELEPHONE ENCOUNTER
Called patient to adjust Ozempic dose. No answer received, and no VM option available. Will try again.

## 2023-07-27 ENCOUNTER — TELEPHONE (OUTPATIENT)
Dept: PRIMARY CARE CLINIC | Age: 68
End: 2023-07-27

## 2023-07-27 DIAGNOSIS — E11.9 CONTROLLED TYPE 2 DIABETES MELLITUS WITHOUT COMPLICATION, WITHOUT LONG-TERM CURRENT USE OF INSULIN (HCC): ICD-10-CM

## 2023-07-27 RX ORDER — SEMAGLUTIDE 1.34 MG/ML
0.5 INJECTION, SOLUTION SUBCUTANEOUS WEEKLY
Qty: 4 ADJUSTABLE DOSE PRE-FILLED PEN SYRINGE | Refills: 0 | Status: SHIPPED | OUTPATIENT
Start: 2023-07-27

## 2023-07-27 RX ORDER — SEMAGLUTIDE 1.34 MG/ML
0.5 INJECTION, SOLUTION SUBCUTANEOUS WEEKLY
Qty: 1 ADJUSTABLE DOSE PRE-FILLED PEN SYRINGE | Refills: 0 | COMMUNITY
Start: 2023-07-27

## 2023-07-27 NOTE — TELEPHONE ENCOUNTER
Spoke with the patient, will increase dose of Ozempic to 0.5 mg Gricel trevino leave 1 sample of this for her today, she will come pick this up.

## 2023-08-03 ENCOUNTER — OFFICE VISIT (OUTPATIENT)
Dept: ENDOCRINOLOGY | Age: 68
End: 2023-08-03
Payer: COMMERCIAL

## 2023-08-03 VITALS
RESPIRATION RATE: 14 BRPM | DIASTOLIC BLOOD PRESSURE: 70 MMHG | WEIGHT: 204 LBS | TEMPERATURE: 98 F | SYSTOLIC BLOOD PRESSURE: 122 MMHG | BODY MASS INDEX: 36.14 KG/M2 | HEIGHT: 63 IN | OXYGEN SATURATION: 96 % | HEART RATE: 78 BPM

## 2023-08-03 DIAGNOSIS — Z98.84 STATUS POST GASTRIC BANDING: ICD-10-CM

## 2023-08-03 DIAGNOSIS — E55.9 VITAMIN D DEFICIENCY: ICD-10-CM

## 2023-08-03 DIAGNOSIS — R79.89 ELEVATED LIVER FUNCTION TESTS: ICD-10-CM

## 2023-08-03 DIAGNOSIS — E78.2 MIXED HYPERLIPIDEMIA: ICD-10-CM

## 2023-08-03 DIAGNOSIS — E04.1 THYROID NODULE: ICD-10-CM

## 2023-08-03 DIAGNOSIS — E66.01 CLASS 2 SEVERE OBESITY WITH SERIOUS COMORBIDITY AND BODY MASS INDEX (BMI) OF 36.0 TO 36.9 IN ADULT, UNSPECIFIED OBESITY TYPE (HCC): ICD-10-CM

## 2023-08-03 DIAGNOSIS — E03.9 ACQUIRED HYPOTHYROIDISM: ICD-10-CM

## 2023-08-03 DIAGNOSIS — E11.9 CONTROLLED TYPE 2 DIABETES MELLITUS WITHOUT COMPLICATION, WITHOUT LONG-TERM CURRENT USE OF INSULIN (HCC): ICD-10-CM

## 2023-08-03 DIAGNOSIS — E11.9 CONTROLLED TYPE 2 DIABETES MELLITUS WITHOUT COMPLICATION, WITHOUT LONG-TERM CURRENT USE OF INSULIN (HCC): Primary | ICD-10-CM

## 2023-08-03 PROBLEM — E66.9 CLASS 2 OBESITY WITH BODY MASS INDEX (BMI) OF 37.0 TO 37.9 IN ADULT: Status: ACTIVE | Noted: 2023-08-03

## 2023-08-03 PROBLEM — I10 ESSENTIAL HYPERTENSION: Status: ACTIVE | Noted: 2023-08-03

## 2023-08-03 LAB
25(OH)D3 SERPL-MCNC: 24.1 NG/ML
ALBUMIN SERPL-MCNC: 3.4 G/DL (ref 3.4–5)
ALBUMIN/GLOB SERPL: 0.5 {RATIO} (ref 1.1–2.2)
ALP SERPL-CCNC: 103 U/L (ref 40–129)
ALT SERPL-CCNC: 14 U/L (ref 10–40)
ANION GAP SERPL CALCULATED.3IONS-SCNC: 9 MMOL/L (ref 3–16)
AST SERPL-CCNC: 16 U/L (ref 15–37)
BILIRUB SERPL-MCNC: 0.4 MG/DL (ref 0–1)
BUN SERPL-MCNC: 14 MG/DL (ref 7–20)
CALCIUM SERPL-MCNC: 10 MG/DL (ref 8.3–10.6)
CHLORIDE SERPL-SCNC: 105 MMOL/L (ref 99–110)
CHOLEST SERPL-MCNC: 144 MG/DL (ref 0–199)
CO2 SERPL-SCNC: 25 MMOL/L (ref 21–32)
CREAT SERPL-MCNC: 0.6 MG/DL (ref 0.6–1.2)
CREAT UR-MCNC: 53.1 MG/DL (ref 28–259)
GFR SERPLBLD CREATININE-BSD FMLA CKD-EPI: >60 ML/MIN/{1.73_M2}
GLUCOSE SERPL-MCNC: 105 MG/DL (ref 70–99)
HDLC SERPL-MCNC: 40 MG/DL (ref 40–60)
LDL CHOLESTEROL CALCULATED: 81 MG/DL
MICROALBUMIN UR DL<=1MG/L-MCNC: <1.2 MG/DL
MICROALBUMIN/CREAT UR: NORMAL MG/G (ref 0–30)
POTASSIUM SERPL-SCNC: 4.2 MMOL/L (ref 3.5–5.1)
PROT SERPL-MCNC: 9.6 G/DL (ref 6.4–8.2)
SODIUM SERPL-SCNC: 139 MMOL/L (ref 136–145)
T3FREE SERPL-MCNC: 2.7 PG/ML (ref 2.3–4.2)
T4 FREE SERPL-MCNC: 1.3 NG/DL (ref 0.9–1.8)
TRIGL SERPL-MCNC: 115 MG/DL (ref 0–150)
TSH SERPL DL<=0.005 MIU/L-ACNC: 1.14 UIU/ML (ref 0.27–4.2)
VLDLC SERPL CALC-MCNC: 23 MG/DL

## 2023-08-03 PROCEDURE — 3074F SYST BP LT 130 MM HG: CPT | Performed by: INTERNAL MEDICINE

## 2023-08-03 PROCEDURE — 3044F HG A1C LEVEL LT 7.0%: CPT | Performed by: INTERNAL MEDICINE

## 2023-08-03 PROCEDURE — 3078F DIAST BP <80 MM HG: CPT | Performed by: INTERNAL MEDICINE

## 2023-08-03 PROCEDURE — 99214 OFFICE O/P EST MOD 30 MIN: CPT | Performed by: INTERNAL MEDICINE

## 2023-08-03 PROCEDURE — 1123F ACP DISCUSS/DSCN MKR DOCD: CPT | Performed by: INTERNAL MEDICINE

## 2023-08-03 NOTE — PROGRESS NOTES
Tia Van is a 74 y.o. female who presents for Type 2 diabetes mellitus. Current symptoms/problems include  no symptoms  and show no change. 1.  Controlled type 2 diabetes mellitus without complication, without long-term current use of insulin ([address]) [E11.9]    Diagnosed with Type 2 diabetes mellitus in 2019. Comorbid conditions:  hyperlipidemia    Current diabetic medications include: Metformin    Intolerance to diabetes medications: No     Weight trend: stable  Prior visit with dietician: yes  Current diet: on average, 3 meals per day  Current exercise: walks     Current monitoring regimen: home blood tests - 1 times daily  Has brought blood glucose log/meter:  Yes  Home blood sugar records: fasting range: 70-80 and postprandial range:    Any episodes of hypoglycemia? No  Hypoglycemia frequency and time(s):    Does patient have Glucagon emergency kit? No  Does patient have rapid acting carbohydrate? No  Does patient wear a medic alert bracelet or necklace? No      2. Acquired hypothyroidism  This started in 2017. Patient was diagnosed with hypothyroidism. The problem has been gradually worsening. Patient started medication in 2017. Currently patient is on: levothyroxine. Misses  0 doses a month. Current complaints: constipation. 3. Thyroid nodule  No difficulty swallowing  History of obstructive symptoms: difficulty swallowing Yes, changes in voice/hoarseness Yes. 4. Status post gastric banding  Had lap banding, lost 100 lbs, then started gaining again. Now is watching her diet, lost weight. 5. Obesity  Fluctuating weight. Eats healthy. Has neuroma, asthma. Difficult to exercise.  for Xi3. 6. Elevated liver function tests  No nausea, vomiting. 7. Essential hypertension  No headaches    8. Vitamin D deficiency  No fatigue      A. Gastric band with port:      Foreign body, clinically gastric band with port ( gross only ).         B. Abdominal lymph node:

## 2023-08-04 ENCOUNTER — TELEPHONE (OUTPATIENT)
Dept: ENDOCRINOLOGY | Age: 68
End: 2023-08-04

## 2023-08-04 LAB
EST. AVERAGE GLUCOSE BLD GHB EST-MCNC: 116.9 MG/DL
HBA1C MFR BLD: 5.7 %

## 2023-08-05 LAB — C PEPTIDE SERPL-MCNC: 4 NG/ML (ref 1.1–4.4)

## 2023-08-05 NOTE — TELEPHONE ENCOUNTER
Please inform patient:  Protein in the urine negative. Hemoglobin A1c 5.7, very good. Cholesterol very good. Vitamin D low. Is she taking vitamin D 2000 IUs as in the chart? Let me know. Thyroid test very good.

## 2023-08-07 NOTE — TELEPHONE ENCOUNTER
Called and informed pt, pt expressed understanding. Pt stopped taking Vitamin d a month ago. She will resume taking it daily.

## 2023-09-07 DIAGNOSIS — E11.9 CONTROLLED TYPE 2 DIABETES MELLITUS WITHOUT COMPLICATION, WITHOUT LONG-TERM CURRENT USE OF INSULIN (HCC): ICD-10-CM

## 2023-09-07 RX ORDER — ATORVASTATIN CALCIUM 20 MG/1
20 TABLET, FILM COATED ORAL DAILY
Qty: 90 TABLET | Refills: 0 | Status: SHIPPED | OUTPATIENT
Start: 2023-09-07

## 2023-09-08 ENCOUNTER — TELEPHONE (OUTPATIENT)
Dept: PRIMARY CARE CLINIC | Age: 68
End: 2023-09-08

## 2023-09-08 DIAGNOSIS — E55.9 VITAMIN D DEFICIENCY: ICD-10-CM

## 2023-09-08 DIAGNOSIS — E11.9 CONTROLLED TYPE 2 DIABETES MELLITUS WITHOUT COMPLICATION, WITHOUT LONG-TERM CURRENT USE OF INSULIN (HCC): ICD-10-CM

## 2023-09-08 DIAGNOSIS — R79.89 ELEVATED LIVER FUNCTION TESTS: ICD-10-CM

## 2023-09-08 DIAGNOSIS — E78.2 MIXED HYPERLIPIDEMIA: ICD-10-CM

## 2023-09-08 DIAGNOSIS — E04.1 THYROID NODULE: ICD-10-CM

## 2023-09-08 DIAGNOSIS — I10 ESSENTIAL HYPERTENSION: Chronic | ICD-10-CM

## 2023-09-08 DIAGNOSIS — E03.9 ACQUIRED HYPOTHYROIDISM: ICD-10-CM

## 2023-09-08 RX ORDER — LEVOTHYROXINE SODIUM 0.1 MG/1
TABLET ORAL
Qty: 90 TABLET | Refills: 1 | Status: SHIPPED | OUTPATIENT
Start: 2023-09-08

## 2023-09-08 RX ORDER — LOSARTAN POTASSIUM 50 MG/1
50 TABLET ORAL DAILY
Qty: 90 TABLET | Refills: 1 | Status: SHIPPED | OUTPATIENT
Start: 2023-09-08

## 2023-09-08 NOTE — TELEPHONE ENCOUNTER
losartan (COZAAR) 50 MG tablet    levothyroxine (SYNTHROID) 100 MCG tablet    Pt also requesting more samples of ozempic.

## 2023-09-28 ENCOUNTER — TELEPHONE (OUTPATIENT)
Dept: ENDOCRINOLOGY | Age: 68
End: 2023-09-28

## 2023-09-28 DIAGNOSIS — R79.89 ELEVATED LIVER FUNCTION TESTS: ICD-10-CM

## 2023-09-28 DIAGNOSIS — E04.1 THYROID NODULE: ICD-10-CM

## 2023-09-28 DIAGNOSIS — E03.9 ACQUIRED HYPOTHYROIDISM: ICD-10-CM

## 2023-09-28 DIAGNOSIS — E78.2 MIXED HYPERLIPIDEMIA: ICD-10-CM

## 2023-09-28 DIAGNOSIS — E55.9 VITAMIN D DEFICIENCY: ICD-10-CM

## 2023-09-28 DIAGNOSIS — E11.9 CONTROLLED TYPE 2 DIABETES MELLITUS WITHOUT COMPLICATION, WITHOUT LONG-TERM CURRENT USE OF INSULIN (HCC): ICD-10-CM

## 2023-09-28 NOTE — TELEPHONE ENCOUNTER
Patient called requesting a refill     Rx- Metformin 500 mg    [address]- 8/3/23  NOV- 2/8/24    Please advise

## 2023-10-31 ENCOUNTER — OFFICE VISIT (OUTPATIENT)
Dept: PRIMARY CARE CLINIC | Age: 68
End: 2023-10-31
Payer: COMMERCIAL

## 2023-10-31 VITALS
OXYGEN SATURATION: 97 % | RESPIRATION RATE: 16 BRPM | HEIGHT: 63 IN | WEIGHT: 208.7 LBS | HEART RATE: 71 BPM | DIASTOLIC BLOOD PRESSURE: 70 MMHG | TEMPERATURE: 97.6 F | SYSTOLIC BLOOD PRESSURE: 122 MMHG | BODY MASS INDEX: 36.98 KG/M2

## 2023-10-31 DIAGNOSIS — C90.01 MULTIPLE MYELOMA IN REMISSION (HCC): ICD-10-CM

## 2023-10-31 DIAGNOSIS — E11.9 CONTROLLED TYPE 2 DIABETES MELLITUS WITHOUT COMPLICATION, WITHOUT LONG-TERM CURRENT USE OF INSULIN (HCC): ICD-10-CM

## 2023-10-31 DIAGNOSIS — I10 ESSENTIAL HYPERTENSION: Primary | ICD-10-CM

## 2023-10-31 DIAGNOSIS — Z12.31 ENCOUNTER FOR SCREENING MAMMOGRAM FOR MALIGNANT NEOPLASM OF BREAST: ICD-10-CM

## 2023-10-31 DIAGNOSIS — Z23 IMMUNIZATION DUE: ICD-10-CM

## 2023-10-31 PROCEDURE — 99213 OFFICE O/P EST LOW 20 MIN: CPT | Performed by: FAMILY MEDICINE

## 2023-10-31 PROCEDURE — 90694 VACC AIIV4 NO PRSRV 0.5ML IM: CPT | Performed by: FAMILY MEDICINE

## 2023-10-31 PROCEDURE — 3078F DIAST BP <80 MM HG: CPT | Performed by: FAMILY MEDICINE

## 2023-10-31 PROCEDURE — 1123F ACP DISCUSS/DSCN MKR DOCD: CPT | Performed by: FAMILY MEDICINE

## 2023-10-31 PROCEDURE — 3074F SYST BP LT 130 MM HG: CPT | Performed by: FAMILY MEDICINE

## 2023-10-31 PROCEDURE — 3044F HG A1C LEVEL LT 7.0%: CPT | Performed by: FAMILY MEDICINE

## 2023-10-31 PROCEDURE — 90471 IMMUNIZATION ADMIN: CPT | Performed by: FAMILY MEDICINE

## 2023-10-31 SDOH — ECONOMIC STABILITY: FOOD INSECURITY: WITHIN THE PAST 12 MONTHS, YOU WORRIED THAT YOUR FOOD WOULD RUN OUT BEFORE YOU GOT MONEY TO BUY MORE.: NEVER TRUE

## 2023-10-31 SDOH — ECONOMIC STABILITY: INCOME INSECURITY: HOW HARD IS IT FOR YOU TO PAY FOR THE VERY BASICS LIKE FOOD, HOUSING, MEDICAL CARE, AND HEATING?: NOT HARD AT ALL

## 2023-10-31 SDOH — ECONOMIC STABILITY: FOOD INSECURITY: WITHIN THE PAST 12 MONTHS, THE FOOD YOU BOUGHT JUST DIDN'T LAST AND YOU DIDN'T HAVE MONEY TO GET MORE.: NEVER TRUE

## 2023-10-31 SDOH — ECONOMIC STABILITY: HOUSING INSECURITY
IN THE LAST 12 MONTHS, WAS THERE A TIME WHEN YOU DID NOT HAVE A STEADY PLACE TO SLEEP OR SLEPT IN A SHELTER (INCLUDING NOW)?: NO

## 2023-10-31 ASSESSMENT — PATIENT HEALTH QUESTIONNAIRE - PHQ9
SUM OF ALL RESPONSES TO PHQ QUESTIONS 1-9: 0
1. LITTLE INTEREST OR PLEASURE IN DOING THINGS: 0
SUM OF ALL RESPONSES TO PHQ QUESTIONS 1-9: 0
SUM OF ALL RESPONSES TO PHQ9 QUESTIONS 1 & 2: 0
2. FEELING DOWN, DEPRESSED OR HOPELESS: 0
SUM OF ALL RESPONSES TO PHQ QUESTIONS 1-9: 0
SUM OF ALL RESPONSES TO PHQ QUESTIONS 1-9: 0

## 2023-10-31 ASSESSMENT — ENCOUNTER SYMPTOMS
DIARRHEA: 0
NAUSEA: 0
BLOOD IN STOOL: 0
SHORTNESS OF BREATH: 0
RHINORRHEA: 0
COLOR CHANGE: 0
VOMITING: 0
COUGH: 0

## 2023-10-31 NOTE — PROGRESS NOTES
AND ABDOMINAL LYMPH NODE EXCISION performed by Christian Morales MD at [address] Left 07/26/2010    Dr. Meseret Ponce - incisional biopsy of left tonsillar mass    [facility]    UPPER GASTROINTESTINAL ENDOSCOPY  01/23/2015    Dr. Faheem Patel  10/16/2010    Dr. Bessy Taylor    work related injury, removed a bone         Current Outpatient Medications:     metFORMIN (GLUCOPHAGE) 500 MG tablet, Take 1 tablet by mouth daily (with breakfast), Disp: 90 tablet, Rfl: 1    losartan (COZAAR) 50 MG tablet, Take 1 tablet by mouth daily, Disp: 90 tablet, Rfl: 1    levothyroxine (SYNTHROID) 100 MCG tablet, TAKE 1 TABLET BY MOUTH ONE TIME A DAY, Disp: 90 tablet, Rfl: 1    Semaglutide, 1 MG/DOSE, 4 MG/3ML SOPN, Inject 1 mg into the skin once a week, Disp: 6 mL, Rfl: 0    atorvastatin (LIPITOR) 20 MG tablet, TAKE 1 TABLET BY MOUTH DAILY, Disp: 90 tablet, Rfl: 0    Cholecalciferol 50 MCG (2000 UT) CAPS, Take 50 mcg by mouth daily, Disp: , Rfl:     albuterol sulfate HFA (PROVENTIL;VENTOLIN;PROAIR) 108 (90 Base) MCG/ACT inhaler, INHALE 2 PUFFS INTO THE LUNGS EVERY 6 HOURS AS NEEDED FOR WHEEZING OR SHORTNESS OF BREATH, Disp: 20.1 g, Rfl: 1  Allergies   Allergen Reactions    Latex Rash    Fruit & Vegetable Daily [Nutritional Supplements] Other (See Comments)     Vomiting, onion, raw tomato( tolerates cooked)  watermelon, peach    Kiwi Extract      LIPS ITCH    Lisinopril Other (See Comments)     Throat tightness. Other Itching     Itchy lips and mouth with fruits-watermelon, peaches,meloln, avocado     Review of Systems:  Review of Systems   Constitutional:  Negative for chills, diaphoresis and fever. HENT:  Negative for congestion and rhinorrhea. Respiratory:  Negative for cough and shortness of breath. Cardiovascular:  Negative for chest pain.    Gastrointestinal:  Negative for blood in

## 2023-10-31 NOTE — ASSESSMENT & PLAN NOTE
Well controlled, asymptomatic, no refills needed, to continue current regimen. UTD on labs. Call back/ED precautions discussed.     Blood Pressure Goal:  [x] < 130/80 (ACC/AHA)  [ ] < 140/90 (JNC-8 for age < 61, or CKD, or DM)  [ ] < 150/90 (JNC-8 for age > 61)

## 2023-11-08 DIAGNOSIS — K86.89 PANCREATIC MASS: ICD-10-CM

## 2023-11-08 DIAGNOSIS — D3A.8 NEUROENDOCRINE TUMOR: Primary | ICD-10-CM

## 2023-11-09 ENCOUNTER — TELEPHONE (OUTPATIENT)
Dept: SURGERY | Age: 68
End: 2023-11-09

## 2023-11-09 NOTE — PROGRESS NOTES
[facility] SURGICAL ONCOLOGY  [facility] [address] [facility] [address]  Dept: [phone]  Dept Fax: [phone]    Visit Date: 11/14/2023    HPI:   Ryan Sam is a 79 y.o. female who returns today to follow up on her pancreatic neuroendocrine (pTa, N0). S/P robotic/laparoscopic pancreatic mass and abdominal lymph node excision along with laparoscopic gastric band and port removal on 1/30/2023. Patient is followed by Dr. Hilary Pierre for a history of  multiple myeloma. Currently in remission. Patient endorses left sided abdominal pain that is relieved with a deep breath.     Past Medical History:   Diagnosis Date    Arthritis of right acromioclavicular joint 6/16/2017    Asthma     Biceps tendinitis on right 6/16/2017    Bursitis of right shoulder 6/16/2017    Causalgia of upper limb 3/18/2014    Chronic right shoulder pain 6/16/2017    Diabetes mellitus ([address])     Elevated liver function tests 10/21/2016    Essential hypertension 12/12/2016    GERD (gastroesophageal reflux disease)     History of cystocele 09/12/2016    Hyperlipidemia     Hypertension     Hypothyroidism 10/21/2016    Incomplete tear of right rotator cuff 10/25/2016    Formatting of this note might be different from the original. [id number] CLAIM    Intrinsic sphincter deficiency (ISD) 11/8/2012    Midline cystocele 6/26/2012    Migraine     Reina's neuroma of left foot 10/21/2016    Multiple myeloma (H[facility])     Numbness and tingling of both upper extremities while sleeping 6/16/2017    Obstructive sleep apnea 10/21/2016    Hx of this    Osteoarthritis     Pain in right upper arm 6/12/2017    Pain radiating to neck 6/16/2017    Pancreatic mass     Benign    Pelvic relaxation due to uterine prolapse 6/26/2012    Rectocele 09/12/2016    Seasonal allergic rhinitis 12/12/2016    Snoring 10/21/2016    Status post gastric banding 04/26/2010    Tendinitis of right rotator cuff 6/16/2017    Uterovaginal prolapse 09/12/2016

## 2023-11-09 NOTE — TELEPHONE ENCOUNTER
MA called patient and scheduled her CT Scan for Monday 11/13/23 Arrival is 10:30 AM  For an 11:00 AM MA was told nothing to eat or drink 4 hours prior and to bring her insurance cards and photo ID. Then the patient will see us as scheduled on 11/14/23 patient verbalized these instructions as MA read them to her.

## 2023-11-13 ENCOUNTER — HOSPITAL ENCOUNTER (OUTPATIENT)
Dept: CT IMAGING | Age: 68
Discharge: HOME | End: 2023-11-13
Payer: COMMERCIAL

## 2023-11-13 DIAGNOSIS — K86.89 PANCREATIC MASS: ICD-10-CM

## 2023-11-13 DIAGNOSIS — D3A.8 NEUROENDOCRINE TUMOR: ICD-10-CM

## 2023-11-13 LAB
PERFORMED ON: NORMAL
POC CREATININE: 0.6 MG/DL (ref 0.6–1.2)
POC SAMPLE TYPE: NORMAL

## 2023-11-13 PROCEDURE — 82565 ASSAY OF CREATININE: CPT

## 2023-11-13 PROCEDURE — 6360000004 HC RX CONTRAST MEDICATION: Performed by: NURSE PRACTITIONER

## 2023-11-13 PROCEDURE — 71260 CT THORAX DX C+: CPT

## 2023-11-13 RX ADMIN — IOHEXOL 100 ML: 350 INJECTION, SOLUTION INTRAVENOUS at 11:26

## 2023-11-14 ENCOUNTER — NURSE ONLY (OUTPATIENT)
Dept: PRIMARY CARE CLINIC | Age: 68
End: 2023-11-14
Payer: COMMERCIAL

## 2023-11-14 ENCOUNTER — OFFICE VISIT (OUTPATIENT)
Dept: SURGERY | Age: 68
End: 2023-11-14
Payer: COMMERCIAL

## 2023-11-14 VITALS
WEIGHT: 210.8 LBS | DIASTOLIC BLOOD PRESSURE: 73 MMHG | TEMPERATURE: 98.3 F | SYSTOLIC BLOOD PRESSURE: 133 MMHG | HEART RATE: 72 BPM | BODY MASS INDEX: 37.35 KG/M2 | HEIGHT: 63 IN

## 2023-11-14 DIAGNOSIS — Z23 IMMUNIZATION DUE: Primary | ICD-10-CM

## 2023-11-14 DIAGNOSIS — D3A.8 NEUROENDOCRINE TUMOR: Primary | ICD-10-CM

## 2023-11-14 LAB — DIABETIC RETINOPATHY: NEGATIVE

## 2023-11-14 PROCEDURE — 1123F ACP DISCUSS/DSCN MKR DOCD: CPT | Performed by: SURGERY

## 2023-11-14 PROCEDURE — 90739 HEPB VACC 2/4 DOSE ADULT IM: CPT | Performed by: FAMILY MEDICINE

## 2023-11-14 PROCEDURE — 3078F DIAST BP <80 MM HG: CPT | Performed by: SURGERY

## 2023-11-14 PROCEDURE — 3074F SYST BP LT 130 MM HG: CPT | Performed by: SURGERY

## 2023-11-14 PROCEDURE — 99213 OFFICE O/P EST LOW 20 MIN: CPT | Performed by: SURGERY

## 2023-11-14 PROCEDURE — 90471 IMMUNIZATION ADMIN: CPT | Performed by: FAMILY MEDICINE

## 2023-11-14 NOTE — PROGRESS NOTES
[place] is here for immunization(s) as noted in orders as signed by Dr. Shilpa Hunt. Consent obtained by patient and VIS provided. Patient tolerated the immunization(s) well with no issues and all questions answered.

## 2023-11-30 PROBLEM — Z12.31 ENCOUNTER FOR SCREENING MAMMOGRAM FOR MALIGNANT NEOPLASM OF BREAST: Status: RESOLVED | Noted: 2023-10-31 | Resolved: 2023-11-30

## 2023-12-11 DIAGNOSIS — E11.9 CONTROLLED TYPE 2 DIABETES MELLITUS WITHOUT COMPLICATION, WITHOUT LONG-TERM CURRENT USE OF INSULIN (HCC): ICD-10-CM

## 2023-12-11 RX ORDER — ATORVASTATIN CALCIUM 20 MG/1
20 TABLET, FILM COATED ORAL DAILY
Qty: 90 TABLET | Refills: 0 | Status: SHIPPED | OUTPATIENT
Start: 2023-12-11

## 2023-12-14 ENCOUNTER — TELEPHONE (OUTPATIENT)
Dept: PRIMARY CARE CLINIC | Age: 68
End: 2023-12-14

## 2023-12-14 NOTE — TELEPHONE ENCOUNTER
ABIDA Allison walked into office and handed me the [facility] DM report. I scanned it into media mgr. She also noted that [facility] will send to Dr. Gonzales, the DM report for her  Tres Rodriguez also.

## 2024-01-08 DIAGNOSIS — J45.20 MILD INTERMITTENT ASTHMA WITHOUT COMPLICATION: Chronic | ICD-10-CM

## 2024-01-08 NOTE — TELEPHONE ENCOUNTER
Medication:   Requested Prescriptions      No prescriptions requested or ordered in this encounter        Last Filled:      Patient Phone Number: [phone] (home)     Last appt: 10/31/2023   Next appt: 4/22/2024    Last OARRS:        No data to display

## 2024-01-09 RX ORDER — ALBUTEROL SULFATE 90 UG/1
2 AEROSOL, METERED RESPIRATORY (INHALATION) EVERY 6 HOURS PRN
Qty: 20.1 G | Refills: 1 | Status: SHIPPED | OUTPATIENT
Start: 2024-01-09

## 2024-03-06 DIAGNOSIS — E78.2 MIXED HYPERLIPIDEMIA: ICD-10-CM

## 2024-03-06 DIAGNOSIS — E03.9 ACQUIRED HYPOTHYROIDISM: ICD-10-CM

## 2024-03-06 DIAGNOSIS — E55.9 VITAMIN D DEFICIENCY: ICD-10-CM

## 2024-03-06 DIAGNOSIS — E11.9 CONTROLLED TYPE 2 DIABETES MELLITUS WITHOUT COMPLICATION, WITHOUT LONG-TERM CURRENT USE OF INSULIN (HCC): ICD-10-CM

## 2024-03-06 DIAGNOSIS — R79.89 ELEVATED LIVER FUNCTION TESTS: ICD-10-CM

## 2024-03-06 DIAGNOSIS — E04.1 THYROID NODULE: ICD-10-CM

## 2024-03-06 RX ORDER — LEVOTHYROXINE SODIUM 0.1 MG/1
TABLET ORAL
Qty: 90 TABLET | Refills: 1 | Status: SHIPPED | OUTPATIENT
Start: 2024-03-06

## 2024-03-10 DIAGNOSIS — E11.9 CONTROLLED TYPE 2 DIABETES MELLITUS WITHOUT COMPLICATION, WITHOUT LONG-TERM CURRENT USE OF INSULIN (HCC): ICD-10-CM

## 2024-03-11 RX ORDER — ATORVASTATIN CALCIUM 20 MG/1
20 TABLET, FILM COATED ORAL DAILY
Qty: 90 TABLET | Refills: 0 | Status: SHIPPED | OUTPATIENT
Start: 2024-03-11

## 2024-07-01 NOTE — PROGRESS NOTES
4 Eyes Admission Assessment     I agree as the admission nurse that 2 RN's have performed a thorough Head to Toe Skin Assessment on the patient. ALL assessment sites listed below have been assessed on admission. Areas assessed by both nurses:   [x]   Head, Face, and Ears   [x]   Shoulders, Back, and Chest  [x]   Arms, Elbows, and Hands   [x]   Coccyx, Sacrum, and Ischium  [x]   Legs, Feet, and Heels        Does the Patient have Skin Breakdown?    No          Jayden Prevention initiated:  No   Wound Care Orders initiated:  No      [facility] nurse consulted for Pressure Injury (Stage 3,4, Unstageable, DTI, NWPT, and Complex wounds) or Jayden score 18 or lower:  No      Nurse 1 eSignature: Electronically signed by Willard Jon RN on 1/30/23 at 2:07 PM EST    **SHARE this note so that the co-signing nurse is able to place an eSignature**    Nurse 2 eSignature: Electronically signed by Barbi Whitman RN on 1/30/23 at 5:18 PM EST Pt has been called 3x. Will close this encounter.

## 2025-04-28 NOTE — PROGRESS NOTES
[facility]    Admission Date     4/24/2025  9:47 AM  Discharge Date  4/28/2025  6:18 PM  Length of Stay (Inpatient)  4    Hospital Course    Chief Complaint   Patient presents with    Shortness of Breath         Patient presented with CHF exacerbation which improved with IV lasix. Had stress test obtained per cardiology. Started guideline-directed medical therapy with plan to follow up in clinic to increase dose or add medications as needed. Per retail pharmacy the jardiance was not covered - [facility].      New onset acute decompensated heart failure  Acute respiratory failure with hypoxia requiring BiPAP  SIRS with acute organ dysfunction        Uncontrolled type 2 diabetes mellitus with hyperglycemia  CKD stage IIIA  Anemia of chronic disease  Chronic hypertension  - monitor chronic conditions               Vital Signs:  Visit Vitals  BP (!) 147/79 (Patient Position: Sitting)   Pulse 95   Temp 97.7 °F (36.5 °C) (Oral)   Resp 19   Ht 5' 6\" (1.676 m)   Wt 75.5 kg (166 lb 7.2 oz)   SpO2 100%   BMI 26.87 kg/m²     General: no acute distress  Head:  Normocephalic, atraumatic  Neck:  Trachea is midline.     Eyes:  Normal conjunctivae and sclerae.   ENT:  Mucous membranes are moist.   Cardiovascular:  Regular rate and rhythm without murmur.  Respiratory:  Normal respiratory effort.  No wheezes or rhonchi.  Gastrointestinal:  Soft, non-tender, non-distended  Musculoskeletal:  normal range of motion, no joint effusions  Skin:  Warm, dry, no edema  Neurologic:  Alert, oriented, no focal deficits    Discharge Medications       Summary of your Discharge Medications        Take these Medications        Details   aspirin 81 MG EC tablet  Commonly known as: ECOTRIN   Take 1 tablet by mouth daily.     atorvastatin 40 MG tablet  Commonly known as: LIPITOR   Take 1 tablet by mouth daily. TAKE ONE-HALF TABLET BY MOUTH EVERY EVENING     empagliflozin 10 MG tablet  Commonly  Physical Therapy  Facility/Department: [address] [place] [address]  Physical Therapy Initial Assessment    Name: Rosanne Whitfield  :   MRN: [id number]  Date of Service: 2023    Discharge Recommendations: Rosanne Whitfield scored a 18/24 on the AM-PAC short mobility form. Current research shows that an AM-PAC score of 18 or greater is typically associated with a discharge to the patient's home setting. Based on the patient's AM-PAC score and their current functional mobility deficits, it is recommended that the patient have 2-3 sessions per week of Physical Therapy at d/ to increase the patient's independence. At this time, this patient demonstrates the endurance and safety to discharge home. Please see assessment section for further patient specific details. If patient discharges prior to next session this note will serve as a discharge summary. Please see below for the latest assessment towards goals. Patient Diagnosis(es): Diagnoses of Pancreatic mass, LAP-BAND surgery status, and Nausea and vomiting, unspecified vomiting type were pertinent to this visit. Past Medical History:  has a past medical history of Asthma, Diabetes mellitus ([address] Elevated liver function tests, Essential hypertension, GERD (gastroesophageal reflux disease), History of cystocele, Hyperglycemia, Hyperlipidemia, Hypertension, Hypothyroidism, Migraine, Morbid obesity due to excess calories ([address] Reina's neuroma of left foot, Non morbid obesity due to excess calories, Obesity, Obstructive sleep apnea, Osteoarthritis, Prediabetes, Rectocele, Seasonal allergic rhinitis, Snoring, Status post gastric banding, and Uterovaginal prolapse. Past Surgical History:  has a past surgical history that includes Abdominoplasty ();  section (); Cholecystectomy; Wrist surgery (Right, ); Lap Band (01/15/2010); Upper gastrointestinal endoscopy (2015);   section; Colonoscopy (2013); Hysterectomy, vaginal (09/21/2012); Cystoscopy (09/21/2012); bladder suspension (09/21/2012); Soft Tissue Biopsy (Left, 07/26/2010); Upper gastrointestinal endoscopy (10/16/2010); Tonsillectomy (1980); Foot neuroma surgery (Left, 10/25/2016); Foot neuroma surgery (Left, 01/2020); Breast biopsy; Pancreatectomy (N/A, 1/30/2023); and Gastric Band (N/A, 1/30/2023). Assessment   Assessment: Patient presents post-op with increased pain with mobility. She has good family support and someone to assist 24hr if needed. She is able to stay on first level of house where there is a bedroom and bathroom. Patient ambulates well with RW but has decreased tolerance to physical activity due to pain and endurance following surgery. Patient to return home with 24hr assist and possible home PT to progress return to PLOF safely. Patient should use RW at all times following d/c to ensure safe mobility and educated on safe home set up and assist when she returns. Anticipate full return to PLOF with healing as symptoms resolve. Will follow while admitted to progress mobility as tolerated.   Equipment Needs:  Rolling walker  Treatment Diagnosis: decreased functional mobility  Requires PT Follow-Up: Yes  Activity Tolerance  Activity Tolerance: Patient tolerated evaluation without incident;Patient limited by fatigue  Activity Tolerance Comments: fatigues quicker than normal, minimal deconditioning noted, able to recover quickly; pt has most significant pain when transferring from standing back to supine     Plan   Physcial Therapy Plan  General Plan:  (2-5)  Safety Devices  Type of Devices: Bed alarm in place, Left in bed, Nurse notified (nurse present)     Restrictions  Position Activity Restriction  Other position/activity restrictions: up as tolerated     Subjective   General  Chart Reviewed: Yes  Patient assessed for rehabilitation services?: Yes  Additional Pertinent Hx: Abdominal MRI 12/2022 reveals Hypervascular 1.7 cm well marginated known as: JARDIANCE  Start taking on: April 29, 2025   Take 1 tablet by mouth daily. Begin taking on April 29, 2025.     ferrous sulfate 325 (65 FE) MG tablet   Take 325 mg by mouth daily (with breakfast).     metoPROLOL succinate 50 MG 24 hr tablet  Commonly known as: TOPROL-XL  Start taking on: April 29, 2025   Take 1 tablet by mouth daily. Begin taking on April 29, 2025.              Discharge Procedure Orders   SERVICE TO CHF CLINIC   Referral Priority: Routine Referral Type: Consult & Treatment   Number of Visits Requested: 1 Expiration Date: 04/24/26       Primary Care Physician  Pcp, No    Follow-up Scheduled  Dustin Reeves MD  [address] [address]  [phone]    Follow up        Patient discharged to: home      I spent 50 minutes in patient care regarding coordinating discharge services.                      exophytic lesion in the pancreatic tail. Patient presents s/p ROBOTIC / LAPAROSCOPIC DISTAL PANCREATECTOMY. POSSIBLE SPLENECTOMY, POSSIBLE OPEN  . LAPAROSCOPIC GASTRIC BAND AND PORT REMOVAL 1/30/2023. PMH: HTN, hyperglycemia, hypothyroidism, GERD, asthma, DM, hyperlipidemia. Diagnosis: pancreatic mass  Subjective  Subjective: Patient walking in [facility] with RN on arrival. Agreeable to evaluation. She says she has pain all over. Social/Functional History  Social/Functional History  Lives With: Spouse  Type of Home: House  Home Layout: Multi-level, Bed/Bath upstairs (can use bedroom and bathroom)  Home Access: Stairs to enter with rails  Entrance Stairs - Number of Steps: 3  Bathroom Shower/Tub: Tub/Shower unit  Bathroom Toilet: Standard  Bathroom Equipment: None (sink next to toilet)  Home Equipment: None  Has the patient had two or more falls in the past year or any fall with injury in the past year?: No  Receives Help From: Family  ADL Assistance: [address] [place] [address] Avenue: Independent  Homemaking Responsibilities: Yes  Ambulation Assistance: Independent  Transfer Assistance: Independent  Active : Yes  Mode of Transportation: Car  Occupation: Retired  Additional Comments: daughter can help during day,  can help at night  Vision/Hearing  Vision  Vision: Impaired  Vision Exceptions: Wears glasses for reading  Hearing  Hearing: Within functional limits    Cognition   Orientation  Overall Orientation Status: Within Functional Limits     Objective   Gross Assessment  AROM: Within functional limits  Strength: Generally decreased, functional   Bed mobility  Rolling to Left: Minimal assistance  Sit to Supine: Minimal assistance; Moderate assistance (with BLEs, pulls through UE to position)  Scooting: Supervision  Transfers  Sit to Stand: Minimal Assistance  Stand to Sit: Contact guard assistance  Ambulation  Device: Rolling Walker  Assistance: Contact guard assistance  Gait Deviations: Slow Apple  Distance: 200'  Comments: Patient declined further ambulation since she just completed ~300ft ambulation with RN prior to evaluation session. Balance  Sitting - Static: Good  Standing - Static: Good  Standing - Dynamic: Fair    AM-PAC Score  AM-PAC Inpatient Mobility Raw Score : 18 (01/31/23 1114)  AM-PAC Inpatient T-Scale Score : 43.63 (01/31/23 1114)  Mobility Inpatient CMS 0-100% Score: 46.58 (01/31/23 1114)  Mobility Inpatient CMS G-Code Modifier : CK (01/31/23 1114)    Goals  Short Term Goals  Time Frame for Short Term Goals: discharge  Short Term Goal 1: supine<>sit SBA  Short Term Goal 2: sit<>stand SBA  Short Term Goal 3: tolerate stair assessment Josh  Short Term Goal 4: ambulate >300ft with/without assistive device supervision  Patient Goals   Patient Goals : not stated       Education  Patient Education  Education Given To: Patient  Education Provided: Role of Therapy  Education Method: Verbal  Barriers to Learning: None  Education Outcome: Verbalized understanding      Therapy Time   Individual Concurrent Group Co-treatment   Time In 1045         Time Out 1109         Minutes 24          Timed Code Treatment Minutes:   9    Total Treatment Minutes:  [address] SPT       Therapist was present, directed the patient's care, made skilled judgement, and was responsible for assessment and treatment of the patient.   Noel Albrecht, PT

## (undated) DEVICE — SUTURE MCRYL SZ 4-0 L27IN ABSRB UD L19MM PS-2 1/2 CIR PRIM Y426H

## (undated) DEVICE — BLADELESS OBTURATOR: Brand: WECK VISTA

## (undated) DEVICE — SOLUTION INJ LR VISIV 1000ML BG

## (undated) DEVICE — SUTURE PERMA-HAND SZ 2-0 L30IN NONABSORBABLE BLK L26MM SH K833H

## (undated) DEVICE — SUTURE VCRL SZ 4-0 L18IN ABSRB UD L19MM PS-2 3/8 CIR PRIM J496H

## (undated) DEVICE — SYSTEM SMK EVAC LAP TBNG FILTER HSNG BENT STYL PNK SEE CLR

## (undated) DEVICE — TROCAR: Brand: KII FIOS FIRST ENTRY

## (undated) DEVICE — 40583 XL ADVANCED TRENDELENBURG POSITIONING KIT: Brand: 40583 XL ADVANCED TRENDELENBURG POSITIONING KIT

## (undated) DEVICE — ROBOTIC: Brand: MEDLINE INDUSTRIES, INC.

## (undated) DEVICE — SYRINGE MED 10ML LUERLOCK TIP W/O SFTY DISP

## (undated) DEVICE — GARMENT,MEDLINE,DVT,INT,CALF,LG, GEN2: Brand: MEDLINE

## (undated) DEVICE — GLOVE SURG SZ 75 L12IN THK75MIL DK GRN LTX FREE

## (undated) DEVICE — 3M™ IOBAN™ 2 ANTIMICROBIAL INCISE DRAPE 6648EZ: Brand: IOBAN™ 2

## (undated) DEVICE — APPLICATOR LAP 35 CM 2 RIGID VISTASEAL

## (undated) DEVICE — TROCARS: Brand: KII® OPTICAL ACCESS SYSTEM

## (undated) DEVICE — SUTURE VCRL SZ 0 L54IN ABSRB VLT W/O NDL POLYGLACTIN 910 J616H

## (undated) DEVICE — SEALANT FIBRIN 4 CC VISTASEAL

## (undated) DEVICE — PENCIL SMK EVAC TELSCP 3 M TBNG

## (undated) DEVICE — INTENDED USED TO PROTECT, TAG AND HELP LOCATED SUTURES DURING SURGERY: Brand: STERION®SUTURE AID BOOTIES

## (undated) DEVICE — SWAB CULT DBL W/O CHAR RAYON TIP AMIES GEL CLMN FOR COLL

## (undated) DEVICE — Device

## (undated) DEVICE — ADHESIVE SKIN CLSR 0.7ML TOP DERMBND ADV

## (undated) DEVICE — TOWEL,OR,DSP,ST,BLUE,DLX,8/PK,10PK/CS: Brand: MEDLINE

## (undated) DEVICE — SUTURE PDS II SZ 0 L60IN ABSRB VLT L48MM CTX 1/2 CIR Z990G

## (undated) DEVICE — TIP-UP FENESTRATED GRASPER: Brand: ENDOWRIST

## (undated) DEVICE — SOLUTION BDINE 16OZ

## (undated) DEVICE — TOTAL TRAY, 16FR 10ML SIL FOLEY, URN: Brand: MEDLINE

## (undated) DEVICE — GENERAL LAPAROSCOPIC: Brand: MEDLINE INDUSTRIES, INC.

## (undated) DEVICE — ANTISEPTIC 16OZ H PEROX 1ST AID ORAL DEBRIDING AGNT

## (undated) DEVICE — TAPE UMB 1/8X24 IN BRAIDED POLYESTER STRL

## (undated) DEVICE — SOLUTION ANTIFOG VIS SYS CLEARIFY LAPSCP

## (undated) DEVICE — LAPAROSCOPIC SCISSORS: Brand: EPIX LAPAROSCOPIC SCISSORS

## (undated) DEVICE — SOLUTION IV 1000ML 0.9% SOD CHL FOR IRRIG PLAS CONT

## (undated) DEVICE — GLOVE SURG SZ 7 L12IN FNGR THK79MIL GRN LTX FREE

## (undated) DEVICE — ELECTROSURGICAL PENCIL ROCKER SWITCH NON COATED BLADE ELECTRODE 10 FT (3 M) CORD HOLSTER: Brand: MEGADYNE

## (undated) DEVICE — PROTECTOR ULN NRV PUR FOAM HK LOOP STRP ANATOMICALLY

## (undated) DEVICE — APPLICATOR MEDICATED 26 CC SOLUTION HI LT ORNG CHLORAPREP

## (undated) DEVICE — TOWEL,STOP FLAG GOLD N-W: Brand: MEDLINE

## (undated) DEVICE — GLOVE SURG SZ 7 L12IN FNGR THK75MIL WHT LTX POLYMER BEAD

## (undated) DEVICE — PUMP SUC IRR TBNG L10FT W/ HNDPC ASSEMB STRYKEFLOW 2

## (undated) DEVICE — SEALER/DIVIDER LAP SHFT L44CM JAW APER 11.4MM 315DEG ROT

## (undated) DEVICE — CANNULA SEAL

## (undated) DEVICE — TROCAR: Brand: KII OPTICAL ACCESS SYSTEM

## (undated) DEVICE — NEEDLE INSUF L150MM DIA2MM DISP FOR PNEUMOPERI ENDOPATH

## (undated) DEVICE — Z DISCONTINUED NEEDLE HYPO 22GA L1.5IN BLK POLYPR HUB S STL REG BVL STR - SEE COMMENT

## (undated) DEVICE — TIP COVER ACCESSORY

## (undated) DEVICE — SUTURE BOOT: Brand: DEROYAL

## (undated) DEVICE — SUTURE PERMAHAND SZ 3-0 L30IN NONABSORBABLE BLK SH L26MM K832H

## (undated) DEVICE — LARGE SUTURE CUT NEEDLE DRIVER: Brand: ENDOWRIST

## (undated) DEVICE — SUTURE VCRL SZ 3-0 L18IN ABSRB UD L26MM SH 1/2 CIR J864D

## (undated) DEVICE — GLOVE ORANGE PI 7   MSG9070

## (undated) DEVICE — VESSEL SEALER EXTEND: Brand: ENDOWRIST

## (undated) DEVICE — ARM DRAPE